# Patient Record
Sex: FEMALE | Race: WHITE | NOT HISPANIC OR LATINO
[De-identification: names, ages, dates, MRNs, and addresses within clinical notes are randomized per-mention and may not be internally consistent; named-entity substitution may affect disease eponyms.]

---

## 2023-07-01 ENCOUNTER — NON-APPOINTMENT (OUTPATIENT)
Age: 58
End: 2023-07-01

## 2023-07-07 PROBLEM — Z00.00 ENCOUNTER FOR PREVENTIVE HEALTH EXAMINATION: Status: ACTIVE | Noted: 2023-07-07

## 2023-07-27 ENCOUNTER — APPOINTMENT (OUTPATIENT)
Dept: CARDIOLOGY | Facility: CLINIC | Age: 58
End: 2023-07-27

## 2023-10-06 ENCOUNTER — RESULT REVIEW (OUTPATIENT)
Age: 58
End: 2023-10-06

## 2023-10-16 ENCOUNTER — APPOINTMENT (OUTPATIENT)
Dept: HEMATOLOGY ONCOLOGY | Facility: CLINIC | Age: 58
End: 2023-10-16
Payer: COMMERCIAL

## 2023-10-16 ENCOUNTER — RESULT REVIEW (OUTPATIENT)
Age: 58
End: 2023-10-16

## 2023-10-16 ENCOUNTER — TRANSCRIPTION ENCOUNTER (OUTPATIENT)
Age: 58
End: 2023-10-16

## 2023-10-16 ENCOUNTER — OUTPATIENT (OUTPATIENT)
Dept: OUTPATIENT SERVICES | Facility: HOSPITAL | Age: 58
LOS: 1 days | End: 2023-10-16
Payer: MEDICAID

## 2023-10-16 ENCOUNTER — NON-APPOINTMENT (OUTPATIENT)
Age: 58
End: 2023-10-16

## 2023-10-16 VITALS
HEIGHT: 63.39 IN | OXYGEN SATURATION: 94 % | HEART RATE: 95 BPM | WEIGHT: 199 LBS | DIASTOLIC BLOOD PRESSURE: 80 MMHG | TEMPERATURE: 97.7 F | BODY MASS INDEX: 34.82 KG/M2 | SYSTOLIC BLOOD PRESSURE: 123 MMHG

## 2023-10-16 DIAGNOSIS — C78.6 SECONDARY MALIGNANT NEOPLASM OF RETROPERITONEUM AND PERITONEUM: ICD-10-CM

## 2023-10-16 LAB
BASOPHILS # BLD AUTO: 0.11 K/UL — SIGNIFICANT CHANGE UP (ref 0–0.2)
BASOPHILS NFR BLD AUTO: 1.1 % — SIGNIFICANT CHANGE UP (ref 0–2)
EOSINOPHIL # BLD AUTO: 0.21 K/UL — SIGNIFICANT CHANGE UP (ref 0–0.5)
EOSINOPHIL NFR BLD AUTO: 2.1 % — SIGNIFICANT CHANGE UP (ref 0–6)
HCT VFR BLD CALC: 37 % — SIGNIFICANT CHANGE UP (ref 34.5–45)
HGB BLD-MCNC: 11.4 G/DL — LOW (ref 11.5–15.5)
IMM GRANULOCYTES NFR BLD AUTO: 0.4 % — SIGNIFICANT CHANGE UP (ref 0–0.9)
LYMPHOCYTES # BLD AUTO: 1.5 K/UL — SIGNIFICANT CHANGE UP (ref 1–3.3)
LYMPHOCYTES # BLD AUTO: 15 % — SIGNIFICANT CHANGE UP (ref 13–44)
MCHC RBC-ENTMCNC: 26.5 PG — LOW (ref 27–34)
MCHC RBC-ENTMCNC: 30.8 GM/DL — LOW (ref 32–36)
MCV RBC AUTO: 86 FL — SIGNIFICANT CHANGE UP (ref 80–100)
MONOCYTES # BLD AUTO: 0.84 K/UL — SIGNIFICANT CHANGE UP (ref 0–0.9)
MONOCYTES NFR BLD AUTO: 8.4 % — SIGNIFICANT CHANGE UP (ref 2–14)
NEUTROPHILS # BLD AUTO: 7.32 K/UL — SIGNIFICANT CHANGE UP (ref 1.8–7.4)
NEUTROPHILS NFR BLD AUTO: 73 % — SIGNIFICANT CHANGE UP (ref 43–77)
NRBC # BLD: 0 /100 WBCS — SIGNIFICANT CHANGE UP (ref 0–0)
PLATELET # BLD AUTO: 597 K/UL — HIGH (ref 150–400)
RBC # BLD: 4.3 M/UL — SIGNIFICANT CHANGE UP (ref 3.8–5.2)
RBC # FLD: 13.2 % — SIGNIFICANT CHANGE UP (ref 10.3–14.5)
WBC # BLD: 10.02 K/UL — SIGNIFICANT CHANGE UP (ref 3.8–10.5)
WBC # FLD AUTO: 10.02 K/UL — SIGNIFICANT CHANGE UP (ref 3.8–10.5)

## 2023-10-16 PROCEDURE — 99215 OFFICE O/P EST HI 40 MIN: CPT

## 2023-10-16 RX ORDER — FUROSEMIDE 40 MG/1
40 TABLET ORAL
Refills: 0 | Status: ACTIVE | COMMUNITY

## 2023-10-16 RX ORDER — ATORVASTATIN CALCIUM 80 MG/1
80 TABLET, FILM COATED ORAL
Refills: 0 | Status: ACTIVE | COMMUNITY

## 2023-10-16 RX ORDER — METOPROLOL SUCCINATE 50 MG/1
50 TABLET, EXTENDED RELEASE ORAL
Refills: 0 | Status: ACTIVE | COMMUNITY

## 2023-10-16 RX ORDER — VALSARTAN 40 MG/1
TABLET ORAL
Refills: 0 | Status: ACTIVE | COMMUNITY

## 2023-10-18 ENCOUNTER — APPOINTMENT (OUTPATIENT)
Dept: NUCLEAR MEDICINE | Facility: CLINIC | Age: 58
End: 2023-10-18
Payer: COMMERCIAL

## 2023-10-18 LAB
ALBUMIN SERPL ELPH-MCNC: 3.5 G/DL
ALP BLD-CCNC: 169 U/L
ALT SERPL-CCNC: 59 U/L
ANION GAP SERPL CALC-SCNC: 14 MMOL/L
APTT BLD: 33.1 SEC
AST SERPL-CCNC: 46 U/L
BILIRUB SERPL-MCNC: 0.3 MG/DL
BUN SERPL-MCNC: 10 MG/DL
CALCIUM SERPL-MCNC: 9.5 MG/DL
CANCER AG125 SERPL-ACNC: 1364 U/ML
CHLORIDE SERPL-SCNC: 104 MMOL/L
CO2 SERPL-SCNC: 25 MMOL/L
CREAT SERPL-MCNC: 0.88 MG/DL
EGFR: 77 ML/MIN/1.73M2
FERRITIN SERPL-MCNC: 288 NG/ML
FOLATE SERPL-MCNC: 11.8 NG/ML
GLUCOSE SERPL-MCNC: 103 MG/DL
HBV CORE IGG+IGM SER QL: NONREACTIVE
HBV SURFACE AB SER QL: NONREACTIVE
HBV SURFACE AB SERPL IA-ACNC: <3 MIU/ML
HBV SURFACE AG SER QL: NONREACTIVE
HCG SERPL-MCNC: 1 MIU/ML
HCV AB SER QL: NONREACTIVE
HCV S/CO RATIO: 0.1 S/CO
HIV1+2 AB SPEC QL IA.RAPID: NONREACTIVE
INR PPP: 1.02 RATIO
IRON SATN MFR SERPL: 13 %
IRON SERPL-MCNC: 32 UG/DL
MAGNESIUM SERPL-MCNC: 2.3 MG/DL
PHOSPHATE SERPL-MCNC: 4.2 MG/DL
POTASSIUM SERPL-SCNC: 4.9 MMOL/L
PROT SERPL-MCNC: 6.9 G/DL
PT BLD: 11.5 SEC
SODIUM SERPL-SCNC: 143 MMOL/L
TIBC SERPL-MCNC: 241 UG/DL
UIBC SERPL-MCNC: 209 UG/DL
VIT B12 SERPL-MCNC: 553 PG/ML

## 2023-10-18 PROCEDURE — 78815 PET IMAGE W/CT SKULL-THIGH: CPT | Mod: PI

## 2023-10-18 PROCEDURE — A9552: CPT

## 2023-10-19 ENCOUNTER — APPOINTMENT (OUTPATIENT)
Dept: GYNECOLOGIC ONCOLOGY | Facility: CLINIC | Age: 58
End: 2023-10-19
Payer: COMMERCIAL

## 2023-10-19 VITALS
BODY MASS INDEX: 33.46 KG/M2 | OXYGEN SATURATION: 95 % | SYSTOLIC BLOOD PRESSURE: 136 MMHG | WEIGHT: 196 LBS | DIASTOLIC BLOOD PRESSURE: 80 MMHG | HEART RATE: 91 BPM | HEIGHT: 64 IN

## 2023-10-19 DIAGNOSIS — Z86.79 PERSONAL HISTORY OF OTHER DISEASES OF THE CIRCULATORY SYSTEM: ICD-10-CM

## 2023-10-19 DIAGNOSIS — Z80.42 FAMILY HISTORY OF MALIGNANT NEOPLASM OF PROSTATE: ICD-10-CM

## 2023-10-19 DIAGNOSIS — Z86.39 PERSONAL HISTORY OF OTHER ENDOCRINE, NUTRITIONAL AND METABOLIC DISEASE: ICD-10-CM

## 2023-10-19 DIAGNOSIS — Z87.891 PERSONAL HISTORY OF NICOTINE DEPENDENCE: ICD-10-CM

## 2023-10-19 PROCEDURE — 99204 OFFICE O/P NEW MOD 45 MIN: CPT

## 2023-10-20 ENCOUNTER — NON-APPOINTMENT (OUTPATIENT)
Age: 58
End: 2023-10-20

## 2023-10-25 RX ORDER — PROCHLORPERAZINE MALEATE 10 MG/1
10 TABLET ORAL
Qty: 30 | Refills: 2 | Status: ACTIVE | COMMUNITY
Start: 2023-10-25 | End: 1900-01-01

## 2023-10-25 RX ORDER — ONDANSETRON 8 MG/1
8 TABLET ORAL EVERY 8 HOURS
Qty: 30 | Refills: 2 | Status: ACTIVE | COMMUNITY
Start: 2023-10-25 | End: 1900-01-01

## 2023-10-26 ENCOUNTER — NON-APPOINTMENT (OUTPATIENT)
Age: 58
End: 2023-10-26

## 2023-10-26 ENCOUNTER — APPOINTMENT (OUTPATIENT)
Dept: HEMATOLOGY ONCOLOGY | Facility: CLINIC | Age: 58
End: 2023-10-26

## 2023-10-26 VITALS
BODY MASS INDEX: 35.12 KG/M2 | TEMPERATURE: 98 F | HEART RATE: 96 BPM | DIASTOLIC BLOOD PRESSURE: 83 MMHG | HEIGHT: 62.95 IN | WEIGHT: 198.2 LBS | OXYGEN SATURATION: 96 % | SYSTOLIC BLOOD PRESSURE: 142 MMHG

## 2023-10-31 ENCOUNTER — RESULT REVIEW (OUTPATIENT)
Age: 58
End: 2023-10-31

## 2023-11-02 ENCOUNTER — APPOINTMENT (OUTPATIENT)
Dept: HEMATOLOGY ONCOLOGY | Facility: CLINIC | Age: 58
End: 2023-11-02
Payer: COMMERCIAL

## 2023-11-02 ENCOUNTER — RESULT REVIEW (OUTPATIENT)
Age: 58
End: 2023-11-02

## 2023-11-02 ENCOUNTER — NON-APPOINTMENT (OUTPATIENT)
Age: 58
End: 2023-11-02

## 2023-11-02 ENCOUNTER — APPOINTMENT (OUTPATIENT)
Dept: INFUSION THERAPY | Facility: CANCER CENTER | Age: 58
End: 2023-11-02

## 2023-11-02 VITALS
OXYGEN SATURATION: 94 % | DIASTOLIC BLOOD PRESSURE: 78 MMHG | RESPIRATION RATE: 18 BRPM | BODY MASS INDEX: 34.18 KG/M2 | WEIGHT: 195.33 LBS | SYSTOLIC BLOOD PRESSURE: 147 MMHG | HEIGHT: 63.23 IN | HEART RATE: 80 BPM | TEMPERATURE: 97.2 F

## 2023-11-02 LAB
ALBUMIN SERPL ELPH-MCNC: 3.7 G/DL — SIGNIFICANT CHANGE UP (ref 3.3–5)
ALBUMIN SERPL ELPH-MCNC: 3.7 G/DL — SIGNIFICANT CHANGE UP (ref 3.3–5)
ALP SERPL-CCNC: 150 U/L — HIGH (ref 40–120)
ALP SERPL-CCNC: 150 U/L — HIGH (ref 40–120)
ALT FLD-CCNC: 29 U/L — SIGNIFICANT CHANGE UP (ref 10–45)
ALT FLD-CCNC: 29 U/L — SIGNIFICANT CHANGE UP (ref 10–45)
ANION GAP SERPL CALC-SCNC: 15 MMOL/L — SIGNIFICANT CHANGE UP (ref 5–17)
ANION GAP SERPL CALC-SCNC: 15 MMOL/L — SIGNIFICANT CHANGE UP (ref 5–17)
AST SERPL-CCNC: 24 U/L — SIGNIFICANT CHANGE UP (ref 10–40)
AST SERPL-CCNC: 24 U/L — SIGNIFICANT CHANGE UP (ref 10–40)
BASOPHILS # BLD AUTO: 0.03 K/UL — SIGNIFICANT CHANGE UP (ref 0–0.2)
BASOPHILS # BLD AUTO: 0.03 K/UL — SIGNIFICANT CHANGE UP (ref 0–0.2)
BASOPHILS NFR BLD AUTO: 0.3 % — SIGNIFICANT CHANGE UP (ref 0–2)
BASOPHILS NFR BLD AUTO: 0.3 % — SIGNIFICANT CHANGE UP (ref 0–2)
BILIRUB SERPL-MCNC: 0.4 MG/DL — SIGNIFICANT CHANGE UP (ref 0.2–1.2)
BILIRUB SERPL-MCNC: 0.4 MG/DL — SIGNIFICANT CHANGE UP (ref 0.2–1.2)
BUN SERPL-MCNC: 12 MG/DL — SIGNIFICANT CHANGE UP (ref 7–23)
BUN SERPL-MCNC: 12 MG/DL — SIGNIFICANT CHANGE UP (ref 7–23)
CALCIUM SERPL-MCNC: 9.5 MG/DL — SIGNIFICANT CHANGE UP (ref 8.4–10.5)
CALCIUM SERPL-MCNC: 9.5 MG/DL — SIGNIFICANT CHANGE UP (ref 8.4–10.5)
CHLORIDE SERPL-SCNC: 100 MMOL/L — SIGNIFICANT CHANGE UP (ref 96–108)
CHLORIDE SERPL-SCNC: 100 MMOL/L — SIGNIFICANT CHANGE UP (ref 96–108)
CO2 SERPL-SCNC: 23 MMOL/L — SIGNIFICANT CHANGE UP (ref 22–31)
CO2 SERPL-SCNC: 23 MMOL/L — SIGNIFICANT CHANGE UP (ref 22–31)
CREAT SERPL-MCNC: 0.78 MG/DL — SIGNIFICANT CHANGE UP (ref 0.5–1.3)
CREAT SERPL-MCNC: 0.78 MG/DL — SIGNIFICANT CHANGE UP (ref 0.5–1.3)
EGFR: 89 ML/MIN/1.73M2 — SIGNIFICANT CHANGE UP
EGFR: 89 ML/MIN/1.73M2 — SIGNIFICANT CHANGE UP
EOSINOPHIL # BLD AUTO: 0.02 K/UL — SIGNIFICANT CHANGE UP (ref 0–0.5)
EOSINOPHIL # BLD AUTO: 0.02 K/UL — SIGNIFICANT CHANGE UP (ref 0–0.5)
EOSINOPHIL NFR BLD AUTO: 0.2 % — SIGNIFICANT CHANGE UP (ref 0–6)
EOSINOPHIL NFR BLD AUTO: 0.2 % — SIGNIFICANT CHANGE UP (ref 0–6)
GLUCOSE SERPL-MCNC: 235 MG/DL — HIGH (ref 70–99)
GLUCOSE SERPL-MCNC: 235 MG/DL — HIGH (ref 70–99)
HCT VFR BLD CALC: 34.8 % — SIGNIFICANT CHANGE UP (ref 34.5–45)
HCT VFR BLD CALC: 34.8 % — SIGNIFICANT CHANGE UP (ref 34.5–45)
HGB BLD-MCNC: 11 G/DL — LOW (ref 11.5–15.5)
HGB BLD-MCNC: 11 G/DL — LOW (ref 11.5–15.5)
IMM GRANULOCYTES NFR BLD AUTO: 0.5 % — SIGNIFICANT CHANGE UP (ref 0–0.9)
IMM GRANULOCYTES NFR BLD AUTO: 0.5 % — SIGNIFICANT CHANGE UP (ref 0–0.9)
LYMPHOCYTES # BLD AUTO: 0.58 K/UL — LOW (ref 1–3.3)
LYMPHOCYTES # BLD AUTO: 0.58 K/UL — LOW (ref 1–3.3)
LYMPHOCYTES # BLD AUTO: 5.4 % — LOW (ref 13–44)
LYMPHOCYTES # BLD AUTO: 5.4 % — LOW (ref 13–44)
MAGNESIUM SERPL-MCNC: 2 MG/DL — SIGNIFICANT CHANGE UP (ref 1.6–2.6)
MAGNESIUM SERPL-MCNC: 2 MG/DL — SIGNIFICANT CHANGE UP (ref 1.6–2.6)
MCHC RBC-ENTMCNC: 26.3 PG — LOW (ref 27–34)
MCHC RBC-ENTMCNC: 26.3 PG — LOW (ref 27–34)
MCHC RBC-ENTMCNC: 31.6 GM/DL — LOW (ref 32–36)
MCHC RBC-ENTMCNC: 31.6 GM/DL — LOW (ref 32–36)
MCV RBC AUTO: 83.1 FL — SIGNIFICANT CHANGE UP (ref 80–100)
MCV RBC AUTO: 83.1 FL — SIGNIFICANT CHANGE UP (ref 80–100)
MONOCYTES # BLD AUTO: 0.16 K/UL — SIGNIFICANT CHANGE UP (ref 0–0.9)
MONOCYTES # BLD AUTO: 0.16 K/UL — SIGNIFICANT CHANGE UP (ref 0–0.9)
MONOCYTES NFR BLD AUTO: 1.5 % — LOW (ref 2–14)
MONOCYTES NFR BLD AUTO: 1.5 % — LOW (ref 2–14)
NEUTROPHILS # BLD AUTO: 9.99 K/UL — HIGH (ref 1.8–7.4)
NEUTROPHILS # BLD AUTO: 9.99 K/UL — HIGH (ref 1.8–7.4)
NEUTROPHILS NFR BLD AUTO: 92.1 % — HIGH (ref 43–77)
NEUTROPHILS NFR BLD AUTO: 92.1 % — HIGH (ref 43–77)
NRBC # BLD: 0 /100 WBCS — SIGNIFICANT CHANGE UP (ref 0–0)
NRBC # BLD: 0 /100 WBCS — SIGNIFICANT CHANGE UP (ref 0–0)
PHOSPHATE SERPL-MCNC: 3.7 MG/DL — SIGNIFICANT CHANGE UP (ref 2.5–4.5)
PHOSPHATE SERPL-MCNC: 3.7 MG/DL — SIGNIFICANT CHANGE UP (ref 2.5–4.5)
PLATELET # BLD AUTO: 522 K/UL — HIGH (ref 150–400)
PLATELET # BLD AUTO: 522 K/UL — HIGH (ref 150–400)
POTASSIUM SERPL-MCNC: 4 MMOL/L — SIGNIFICANT CHANGE UP (ref 3.5–5.3)
POTASSIUM SERPL-MCNC: 4 MMOL/L — SIGNIFICANT CHANGE UP (ref 3.5–5.3)
POTASSIUM SERPL-SCNC: 4 MMOL/L — SIGNIFICANT CHANGE UP (ref 3.5–5.3)
POTASSIUM SERPL-SCNC: 4 MMOL/L — SIGNIFICANT CHANGE UP (ref 3.5–5.3)
PROT SERPL-MCNC: 7.1 G/DL — SIGNIFICANT CHANGE UP (ref 6–8.3)
PROT SERPL-MCNC: 7.1 G/DL — SIGNIFICANT CHANGE UP (ref 6–8.3)
RBC # BLD: 4.19 M/UL — SIGNIFICANT CHANGE UP (ref 3.8–5.2)
RBC # BLD: 4.19 M/UL — SIGNIFICANT CHANGE UP (ref 3.8–5.2)
RBC # FLD: 13.2 % — SIGNIFICANT CHANGE UP (ref 10.3–14.5)
RBC # FLD: 13.2 % — SIGNIFICANT CHANGE UP (ref 10.3–14.5)
SODIUM SERPL-SCNC: 138 MMOL/L — SIGNIFICANT CHANGE UP (ref 135–145)
SODIUM SERPL-SCNC: 138 MMOL/L — SIGNIFICANT CHANGE UP (ref 135–145)
WBC # BLD: 10.83 K/UL — HIGH (ref 3.8–10.5)
WBC # BLD: 10.83 K/UL — HIGH (ref 3.8–10.5)
WBC # FLD AUTO: 10.83 K/UL — HIGH (ref 3.8–10.5)
WBC # FLD AUTO: 10.83 K/UL — HIGH (ref 3.8–10.5)

## 2023-11-02 PROCEDURE — 99214 OFFICE O/P EST MOD 30 MIN: CPT

## 2023-11-03 DIAGNOSIS — R11.2 NAUSEA WITH VOMITING, UNSPECIFIED: ICD-10-CM

## 2023-11-03 DIAGNOSIS — Z51.11 ENCOUNTER FOR ANTINEOPLASTIC CHEMOTHERAPY: ICD-10-CM

## 2023-11-03 DIAGNOSIS — C56.9 MALIGNANT NEOPLASM OF UNSPECIFIED OVARY: ICD-10-CM

## 2023-11-03 LAB
CREAT ?TM UR-MCNC: 19 MG/DL — SIGNIFICANT CHANGE UP
CREAT ?TM UR-MCNC: 19 MG/DL — SIGNIFICANT CHANGE UP
PROT ?TM UR-MCNC: <4 MG/DL — SIGNIFICANT CHANGE UP (ref 0–12)
PROT ?TM UR-MCNC: <4 MG/DL — SIGNIFICANT CHANGE UP (ref 0–12)
PROT/CREAT UR-RTO: SIGNIFICANT CHANGE UP RATIO (ref 0–0.2)
PROT/CREAT UR-RTO: SIGNIFICANT CHANGE UP RATIO (ref 0–0.2)

## 2023-11-21 ENCOUNTER — APPOINTMENT (OUTPATIENT)
Dept: HEMATOLOGY ONCOLOGY | Facility: CLINIC | Age: 58
End: 2023-11-21

## 2023-11-21 ENCOUNTER — RESULT REVIEW (OUTPATIENT)
Age: 58
End: 2023-11-21

## 2023-11-21 LAB
BASOPHILS # BLD AUTO: 0.08 K/UL — SIGNIFICANT CHANGE UP (ref 0–0.2)
BASOPHILS # BLD AUTO: 0.08 K/UL — SIGNIFICANT CHANGE UP (ref 0–0.2)
BASOPHILS NFR BLD AUTO: 0.8 % — SIGNIFICANT CHANGE UP (ref 0–2)
BASOPHILS NFR BLD AUTO: 0.8 % — SIGNIFICANT CHANGE UP (ref 0–2)
EOSINOPHIL # BLD AUTO: 0.09 K/UL — SIGNIFICANT CHANGE UP (ref 0–0.5)
EOSINOPHIL # BLD AUTO: 0.09 K/UL — SIGNIFICANT CHANGE UP (ref 0–0.5)
EOSINOPHIL NFR BLD AUTO: 0.9 % — SIGNIFICANT CHANGE UP (ref 0–6)
EOSINOPHIL NFR BLD AUTO: 0.9 % — SIGNIFICANT CHANGE UP (ref 0–6)
HCT VFR BLD CALC: 34.7 % — SIGNIFICANT CHANGE UP (ref 34.5–45)
HCT VFR BLD CALC: 34.7 % — SIGNIFICANT CHANGE UP (ref 34.5–45)
HGB BLD-MCNC: 10.9 G/DL — LOW (ref 11.5–15.5)
HGB BLD-MCNC: 10.9 G/DL — LOW (ref 11.5–15.5)
IMM GRANULOCYTES NFR BLD AUTO: 0.8 % — SIGNIFICANT CHANGE UP (ref 0–0.9)
IMM GRANULOCYTES NFR BLD AUTO: 0.8 % — SIGNIFICANT CHANGE UP (ref 0–0.9)
LYMPHOCYTES # BLD AUTO: 2.34 K/UL — SIGNIFICANT CHANGE UP (ref 1–3.3)
LYMPHOCYTES # BLD AUTO: 2.34 K/UL — SIGNIFICANT CHANGE UP (ref 1–3.3)
LYMPHOCYTES # BLD AUTO: 23.9 % — SIGNIFICANT CHANGE UP (ref 13–44)
LYMPHOCYTES # BLD AUTO: 23.9 % — SIGNIFICANT CHANGE UP (ref 13–44)
MCHC RBC-ENTMCNC: 25.7 PG — LOW (ref 27–34)
MCHC RBC-ENTMCNC: 25.7 PG — LOW (ref 27–34)
MCHC RBC-ENTMCNC: 31.4 GM/DL — LOW (ref 32–36)
MCHC RBC-ENTMCNC: 31.4 GM/DL — LOW (ref 32–36)
MCV RBC AUTO: 81.8 FL — SIGNIFICANT CHANGE UP (ref 80–100)
MCV RBC AUTO: 81.8 FL — SIGNIFICANT CHANGE UP (ref 80–100)
MONOCYTES # BLD AUTO: 1.01 K/UL — HIGH (ref 0–0.9)
MONOCYTES # BLD AUTO: 1.01 K/UL — HIGH (ref 0–0.9)
MONOCYTES NFR BLD AUTO: 10.3 % — SIGNIFICANT CHANGE UP (ref 2–14)
MONOCYTES NFR BLD AUTO: 10.3 % — SIGNIFICANT CHANGE UP (ref 2–14)
NEUTROPHILS # BLD AUTO: 6.18 K/UL — SIGNIFICANT CHANGE UP (ref 1.8–7.4)
NEUTROPHILS # BLD AUTO: 6.18 K/UL — SIGNIFICANT CHANGE UP (ref 1.8–7.4)
NEUTROPHILS NFR BLD AUTO: 63.3 % — SIGNIFICANT CHANGE UP (ref 43–77)
NEUTROPHILS NFR BLD AUTO: 63.3 % — SIGNIFICANT CHANGE UP (ref 43–77)
NRBC # BLD: 0 /100 WBCS — SIGNIFICANT CHANGE UP (ref 0–0)
NRBC # BLD: 0 /100 WBCS — SIGNIFICANT CHANGE UP (ref 0–0)
PLATELET # BLD AUTO: 261 K/UL — SIGNIFICANT CHANGE UP (ref 150–400)
PLATELET # BLD AUTO: 261 K/UL — SIGNIFICANT CHANGE UP (ref 150–400)
RBC # BLD: 4.24 M/UL — SIGNIFICANT CHANGE UP (ref 3.8–5.2)
RBC # BLD: 4.24 M/UL — SIGNIFICANT CHANGE UP (ref 3.8–5.2)
RBC # FLD: 14.4 % — SIGNIFICANT CHANGE UP (ref 10.3–14.5)
RBC # FLD: 14.4 % — SIGNIFICANT CHANGE UP (ref 10.3–14.5)
WBC # BLD: 9.78 K/UL — SIGNIFICANT CHANGE UP (ref 3.8–10.5)
WBC # BLD: 9.78 K/UL — SIGNIFICANT CHANGE UP (ref 3.8–10.5)
WBC # FLD AUTO: 9.78 K/UL — SIGNIFICANT CHANGE UP (ref 3.8–10.5)
WBC # FLD AUTO: 9.78 K/UL — SIGNIFICANT CHANGE UP (ref 3.8–10.5)

## 2023-11-22 LAB
ALBUMIN SERPL ELPH-MCNC: 4 G/DL — SIGNIFICANT CHANGE UP (ref 3.3–5)
ALBUMIN SERPL ELPH-MCNC: 4 G/DL — SIGNIFICANT CHANGE UP (ref 3.3–5)
ALP SERPL-CCNC: 146 U/L — HIGH (ref 40–120)
ALP SERPL-CCNC: 146 U/L — HIGH (ref 40–120)
ALT FLD-CCNC: 30 U/L — SIGNIFICANT CHANGE UP (ref 10–45)
ALT FLD-CCNC: 30 U/L — SIGNIFICANT CHANGE UP (ref 10–45)
ANION GAP SERPL CALC-SCNC: 14 MMOL/L — SIGNIFICANT CHANGE UP (ref 5–17)
ANION GAP SERPL CALC-SCNC: 14 MMOL/L — SIGNIFICANT CHANGE UP (ref 5–17)
AST SERPL-CCNC: 21 U/L — SIGNIFICANT CHANGE UP (ref 10–40)
AST SERPL-CCNC: 21 U/L — SIGNIFICANT CHANGE UP (ref 10–40)
BILIRUB SERPL-MCNC: 0.3 MG/DL — SIGNIFICANT CHANGE UP (ref 0.2–1.2)
BILIRUB SERPL-MCNC: 0.3 MG/DL — SIGNIFICANT CHANGE UP (ref 0.2–1.2)
BUN SERPL-MCNC: 15 MG/DL — SIGNIFICANT CHANGE UP (ref 7–23)
BUN SERPL-MCNC: 15 MG/DL — SIGNIFICANT CHANGE UP (ref 7–23)
CALCIUM SERPL-MCNC: 9.6 MG/DL — SIGNIFICANT CHANGE UP (ref 8.4–10.5)
CALCIUM SERPL-MCNC: 9.6 MG/DL — SIGNIFICANT CHANGE UP (ref 8.4–10.5)
CHLORIDE SERPL-SCNC: 101 MMOL/L — SIGNIFICANT CHANGE UP (ref 96–108)
CHLORIDE SERPL-SCNC: 101 MMOL/L — SIGNIFICANT CHANGE UP (ref 96–108)
CO2 SERPL-SCNC: 26 MMOL/L — SIGNIFICANT CHANGE UP (ref 22–31)
CO2 SERPL-SCNC: 26 MMOL/L — SIGNIFICANT CHANGE UP (ref 22–31)
CREAT SERPL-MCNC: 0.86 MG/DL — SIGNIFICANT CHANGE UP (ref 0.5–1.3)
CREAT SERPL-MCNC: 0.86 MG/DL — SIGNIFICANT CHANGE UP (ref 0.5–1.3)
EGFR: 79 ML/MIN/1.73M2 — SIGNIFICANT CHANGE UP
EGFR: 79 ML/MIN/1.73M2 — SIGNIFICANT CHANGE UP
GLUCOSE SERPL-MCNC: 89 MG/DL — SIGNIFICANT CHANGE UP (ref 70–99)
GLUCOSE SERPL-MCNC: 89 MG/DL — SIGNIFICANT CHANGE UP (ref 70–99)
MAGNESIUM SERPL-MCNC: 2.3 MG/DL — SIGNIFICANT CHANGE UP (ref 1.6–2.6)
MAGNESIUM SERPL-MCNC: 2.3 MG/DL — SIGNIFICANT CHANGE UP (ref 1.6–2.6)
POTASSIUM SERPL-MCNC: 4.7 MMOL/L — SIGNIFICANT CHANGE UP (ref 3.5–5.3)
POTASSIUM SERPL-MCNC: 4.7 MMOL/L — SIGNIFICANT CHANGE UP (ref 3.5–5.3)
POTASSIUM SERPL-SCNC: 4.7 MMOL/L — SIGNIFICANT CHANGE UP (ref 3.5–5.3)
POTASSIUM SERPL-SCNC: 4.7 MMOL/L — SIGNIFICANT CHANGE UP (ref 3.5–5.3)
PROT SERPL-MCNC: 7.2 G/DL — SIGNIFICANT CHANGE UP (ref 6–8.3)
PROT SERPL-MCNC: 7.2 G/DL — SIGNIFICANT CHANGE UP (ref 6–8.3)
SODIUM SERPL-SCNC: 140 MMOL/L — SIGNIFICANT CHANGE UP (ref 135–145)
SODIUM SERPL-SCNC: 140 MMOL/L — SIGNIFICANT CHANGE UP (ref 135–145)

## 2023-11-24 ENCOUNTER — RESULT REVIEW (OUTPATIENT)
Age: 58
End: 2023-11-24

## 2023-11-24 ENCOUNTER — APPOINTMENT (OUTPATIENT)
Dept: INFUSION THERAPY | Facility: CANCER CENTER | Age: 58
End: 2023-11-24

## 2023-11-24 ENCOUNTER — APPOINTMENT (OUTPATIENT)
Dept: HEMATOLOGY ONCOLOGY | Facility: CLINIC | Age: 58
End: 2023-11-24
Payer: COMMERCIAL

## 2023-11-24 DIAGNOSIS — G89.3 NEOPLASM RELATED PAIN (ACUTE) (CHRONIC): ICD-10-CM

## 2023-11-24 LAB
BASOPHILS # BLD AUTO: 0 K/UL — SIGNIFICANT CHANGE UP (ref 0–0.2)
BASOPHILS # BLD AUTO: 0 K/UL — SIGNIFICANT CHANGE UP (ref 0–0.2)
BASOPHILS NFR BLD AUTO: 0 % — SIGNIFICANT CHANGE UP (ref 0–2)
BASOPHILS NFR BLD AUTO: 0 % — SIGNIFICANT CHANGE UP (ref 0–2)
CREAT ?TM UR-MCNC: 92 MG/DL — SIGNIFICANT CHANGE UP
CREAT ?TM UR-MCNC: 92 MG/DL — SIGNIFICANT CHANGE UP
EOSINOPHIL # BLD AUTO: 0.13 K/UL — SIGNIFICANT CHANGE UP (ref 0–0.5)
EOSINOPHIL # BLD AUTO: 0.13 K/UL — SIGNIFICANT CHANGE UP (ref 0–0.5)
EOSINOPHIL NFR BLD AUTO: 1 % — SIGNIFICANT CHANGE UP (ref 0–6)
EOSINOPHIL NFR BLD AUTO: 1 % — SIGNIFICANT CHANGE UP (ref 0–6)
HCT VFR BLD CALC: 33.1 % — LOW (ref 34.5–45)
HCT VFR BLD CALC: 33.1 % — LOW (ref 34.5–45)
HGB BLD-MCNC: 10.7 G/DL — LOW (ref 11.5–15.5)
HGB BLD-MCNC: 10.7 G/DL — LOW (ref 11.5–15.5)
LYMPHOCYTES # BLD AUTO: 0.78 K/UL — LOW (ref 1–3.3)
LYMPHOCYTES # BLD AUTO: 0.78 K/UL — LOW (ref 1–3.3)
LYMPHOCYTES # BLD AUTO: 6 % — LOW (ref 13–44)
LYMPHOCYTES # BLD AUTO: 6 % — LOW (ref 13–44)
MCHC RBC-ENTMCNC: 26.3 PG — LOW (ref 27–34)
MCHC RBC-ENTMCNC: 26.3 PG — LOW (ref 27–34)
MCHC RBC-ENTMCNC: 32.3 GM/DL — SIGNIFICANT CHANGE UP (ref 32–36)
MCHC RBC-ENTMCNC: 32.3 GM/DL — SIGNIFICANT CHANGE UP (ref 32–36)
MCV RBC AUTO: 81.3 FL — SIGNIFICANT CHANGE UP (ref 80–100)
MCV RBC AUTO: 81.3 FL — SIGNIFICANT CHANGE UP (ref 80–100)
METAMYELOCYTES # FLD: 1 % — HIGH (ref 0–0)
METAMYELOCYTES # FLD: 1 % — HIGH (ref 0–0)
MONOCYTES # BLD AUTO: 0.13 K/UL — SIGNIFICANT CHANGE UP (ref 0–0.9)
MONOCYTES # BLD AUTO: 0.13 K/UL — SIGNIFICANT CHANGE UP (ref 0–0.9)
MONOCYTES NFR BLD AUTO: 1 % — LOW (ref 2–14)
MONOCYTES NFR BLD AUTO: 1 % — LOW (ref 2–14)
NEUTROPHILS # BLD AUTO: 11.78 K/UL — HIGH (ref 1.8–7.4)
NEUTROPHILS # BLD AUTO: 11.78 K/UL — HIGH (ref 1.8–7.4)
NEUTROPHILS NFR BLD AUTO: 90 % — HIGH (ref 43–77)
NEUTROPHILS NFR BLD AUTO: 90 % — HIGH (ref 43–77)
NEUTS BAND # BLD: 1 % — SIGNIFICANT CHANGE UP (ref 0–8)
NEUTS BAND # BLD: 1 % — SIGNIFICANT CHANGE UP (ref 0–8)
NRBC # BLD: 0 /100 — SIGNIFICANT CHANGE UP (ref 0–0)
NRBC # BLD: 0 /100 — SIGNIFICANT CHANGE UP (ref 0–0)
NRBC # BLD: SIGNIFICANT CHANGE UP /100 WBCS (ref 0–0)
NRBC # BLD: SIGNIFICANT CHANGE UP /100 WBCS (ref 0–0)
PLAT MORPH BLD: NORMAL — SIGNIFICANT CHANGE UP
PLAT MORPH BLD: NORMAL — SIGNIFICANT CHANGE UP
PLATELET # BLD AUTO: 292 K/UL — SIGNIFICANT CHANGE UP (ref 150–400)
PLATELET # BLD AUTO: 292 K/UL — SIGNIFICANT CHANGE UP (ref 150–400)
PROT ?TM UR-MCNC: 16 MG/DL — HIGH (ref 0–12)
PROT ?TM UR-MCNC: 16 MG/DL — HIGH (ref 0–12)
PROT/CREAT UR-RTO: 0.2 RATIO — SIGNIFICANT CHANGE UP (ref 0–0.2)
PROT/CREAT UR-RTO: 0.2 RATIO — SIGNIFICANT CHANGE UP (ref 0–0.2)
RBC # BLD: 4.07 M/UL — SIGNIFICANT CHANGE UP (ref 3.8–5.2)
RBC # BLD: 4.07 M/UL — SIGNIFICANT CHANGE UP (ref 3.8–5.2)
RBC # FLD: 14.1 % — SIGNIFICANT CHANGE UP (ref 10.3–14.5)
RBC # FLD: 14.1 % — SIGNIFICANT CHANGE UP (ref 10.3–14.5)
RBC BLD AUTO: NORMAL — SIGNIFICANT CHANGE UP
RBC BLD AUTO: NORMAL — SIGNIFICANT CHANGE UP
WBC # BLD: 12.94 K/UL — HIGH (ref 3.8–10.5)
WBC # BLD: 12.94 K/UL — HIGH (ref 3.8–10.5)
WBC # FLD AUTO: 12.94 K/UL — HIGH (ref 3.8–10.5)
WBC # FLD AUTO: 12.94 K/UL — HIGH (ref 3.8–10.5)

## 2023-11-24 PROCEDURE — 99215 OFFICE O/P EST HI 40 MIN: CPT

## 2023-11-24 RX ORDER — TRAMADOL HYDROCHLORIDE 50 MG/1
50 TABLET, COATED ORAL
Qty: 45 | Refills: 0 | Status: ACTIVE | COMMUNITY
Start: 2023-11-24 | End: 1900-01-01

## 2023-12-11 ENCOUNTER — APPOINTMENT (OUTPATIENT)
Dept: HEMATOLOGY ONCOLOGY | Facility: CLINIC | Age: 58
End: 2023-12-11

## 2023-12-11 ENCOUNTER — RESULT REVIEW (OUTPATIENT)
Age: 58
End: 2023-12-11

## 2023-12-11 LAB
BASOPHILS # BLD AUTO: 0.06 K/UL — SIGNIFICANT CHANGE UP (ref 0–0.2)
BASOPHILS # BLD AUTO: 0.06 K/UL — SIGNIFICANT CHANGE UP (ref 0–0.2)
BASOPHILS NFR BLD AUTO: 0.8 % — SIGNIFICANT CHANGE UP (ref 0–2)
BASOPHILS NFR BLD AUTO: 0.8 % — SIGNIFICANT CHANGE UP (ref 0–2)
EOSINOPHIL # BLD AUTO: 0.03 K/UL — SIGNIFICANT CHANGE UP (ref 0–0.5)
EOSINOPHIL # BLD AUTO: 0.03 K/UL — SIGNIFICANT CHANGE UP (ref 0–0.5)
EOSINOPHIL NFR BLD AUTO: 0.4 % — SIGNIFICANT CHANGE UP (ref 0–6)
EOSINOPHIL NFR BLD AUTO: 0.4 % — SIGNIFICANT CHANGE UP (ref 0–6)
HCT VFR BLD CALC: 33.4 % — LOW (ref 34.5–45)
HCT VFR BLD CALC: 33.4 % — LOW (ref 34.5–45)
HGB BLD-MCNC: 10.9 G/DL — LOW (ref 11.5–15.5)
HGB BLD-MCNC: 10.9 G/DL — LOW (ref 11.5–15.5)
IMM GRANULOCYTES NFR BLD AUTO: 0.7 % — SIGNIFICANT CHANGE UP (ref 0–0.9)
IMM GRANULOCYTES NFR BLD AUTO: 0.7 % — SIGNIFICANT CHANGE UP (ref 0–0.9)
LYMPHOCYTES # BLD AUTO: 2.28 K/UL — SIGNIFICANT CHANGE UP (ref 1–3.3)
LYMPHOCYTES # BLD AUTO: 2.28 K/UL — SIGNIFICANT CHANGE UP (ref 1–3.3)
LYMPHOCYTES # BLD AUTO: 31.3 % — SIGNIFICANT CHANGE UP (ref 13–44)
LYMPHOCYTES # BLD AUTO: 31.3 % — SIGNIFICANT CHANGE UP (ref 13–44)
MCHC RBC-ENTMCNC: 26.6 PG — LOW (ref 27–34)
MCHC RBC-ENTMCNC: 26.6 PG — LOW (ref 27–34)
MCHC RBC-ENTMCNC: 32.6 GM/DL — SIGNIFICANT CHANGE UP (ref 32–36)
MCHC RBC-ENTMCNC: 32.6 GM/DL — SIGNIFICANT CHANGE UP (ref 32–36)
MCV RBC AUTO: 81.5 FL — SIGNIFICANT CHANGE UP (ref 80–100)
MCV RBC AUTO: 81.5 FL — SIGNIFICANT CHANGE UP (ref 80–100)
MONOCYTES # BLD AUTO: 0.8 K/UL — SIGNIFICANT CHANGE UP (ref 0–0.9)
MONOCYTES # BLD AUTO: 0.8 K/UL — SIGNIFICANT CHANGE UP (ref 0–0.9)
MONOCYTES NFR BLD AUTO: 11 % — SIGNIFICANT CHANGE UP (ref 2–14)
MONOCYTES NFR BLD AUTO: 11 % — SIGNIFICANT CHANGE UP (ref 2–14)
NEUTROPHILS # BLD AUTO: 4.06 K/UL — SIGNIFICANT CHANGE UP (ref 1.8–7.4)
NEUTROPHILS # BLD AUTO: 4.06 K/UL — SIGNIFICANT CHANGE UP (ref 1.8–7.4)
NEUTROPHILS NFR BLD AUTO: 55.8 % — SIGNIFICANT CHANGE UP (ref 43–77)
NEUTROPHILS NFR BLD AUTO: 55.8 % — SIGNIFICANT CHANGE UP (ref 43–77)
NRBC # BLD: 0 /100 WBCS — SIGNIFICANT CHANGE UP (ref 0–0)
NRBC # BLD: 0 /100 WBCS — SIGNIFICANT CHANGE UP (ref 0–0)
PLATELET # BLD AUTO: 281 K/UL — SIGNIFICANT CHANGE UP (ref 150–400)
PLATELET # BLD AUTO: 281 K/UL — SIGNIFICANT CHANGE UP (ref 150–400)
RBC # BLD: 4.1 M/UL — SIGNIFICANT CHANGE UP (ref 3.8–5.2)
RBC # BLD: 4.1 M/UL — SIGNIFICANT CHANGE UP (ref 3.8–5.2)
RBC # FLD: 16.2 % — HIGH (ref 10.3–14.5)
RBC # FLD: 16.2 % — HIGH (ref 10.3–14.5)
WBC # BLD: 7.28 K/UL — SIGNIFICANT CHANGE UP (ref 3.8–10.5)
WBC # BLD: 7.28 K/UL — SIGNIFICANT CHANGE UP (ref 3.8–10.5)
WBC # FLD AUTO: 7.28 K/UL — SIGNIFICANT CHANGE UP (ref 3.8–10.5)
WBC # FLD AUTO: 7.28 K/UL — SIGNIFICANT CHANGE UP (ref 3.8–10.5)

## 2023-12-12 ENCOUNTER — NON-APPOINTMENT (OUTPATIENT)
Age: 58
End: 2023-12-12

## 2023-12-12 LAB
ALBUMIN SERPL ELPH-MCNC: 3.6 G/DL
ALP BLD-CCNC: 128 U/L
ALT SERPL-CCNC: 27 U/L
ANION GAP SERPL CALC-SCNC: 12 MMOL/L
AST SERPL-CCNC: 14 U/L
BILIRUB SERPL-MCNC: 0.3 MG/DL
BUN SERPL-MCNC: 14 MG/DL
CALCIUM SERPL-MCNC: 9.3 MG/DL
CHLORIDE SERPL-SCNC: 103 MMOL/L
CO2 SERPL-SCNC: 24 MMOL/L
CREAT SERPL-MCNC: 0.92 MG/DL
EGFR: 73 ML/MIN/1.73M2
GLUCOSE SERPL-MCNC: 99 MG/DL
MAGNESIUM SERPL-MCNC: 2.2 MG/DL
PHOSPHATE SERPL-MCNC: 3.1 MG/DL
POTASSIUM SERPL-SCNC: 4.1 MMOL/L
PROT SERPL-MCNC: 7.2 G/DL
SODIUM SERPL-SCNC: 139 MMOL/L

## 2023-12-14 ENCOUNTER — RESULT REVIEW (OUTPATIENT)
Age: 58
End: 2023-12-14

## 2023-12-14 ENCOUNTER — APPOINTMENT (OUTPATIENT)
Dept: INFUSION THERAPY | Facility: CANCER CENTER | Age: 58
End: 2023-12-14

## 2023-12-14 ENCOUNTER — APPOINTMENT (OUTPATIENT)
Dept: HEMATOLOGY ONCOLOGY | Facility: CLINIC | Age: 58
End: 2023-12-14
Payer: COMMERCIAL

## 2023-12-14 LAB
ALBUMIN SERPL ELPH-MCNC: 3.8 G/DL — SIGNIFICANT CHANGE UP (ref 3.3–5)
ALBUMIN SERPL ELPH-MCNC: 3.8 G/DL — SIGNIFICANT CHANGE UP (ref 3.3–5)
ALP SERPL-CCNC: 118 U/L — SIGNIFICANT CHANGE UP (ref 40–120)
ALP SERPL-CCNC: 118 U/L — SIGNIFICANT CHANGE UP (ref 40–120)
ALT FLD-CCNC: 30 U/L — SIGNIFICANT CHANGE UP (ref 10–45)
ALT FLD-CCNC: 30 U/L — SIGNIFICANT CHANGE UP (ref 10–45)
ANION GAP SERPL CALC-SCNC: 17 MMOL/L — SIGNIFICANT CHANGE UP (ref 5–17)
ANION GAP SERPL CALC-SCNC: 17 MMOL/L — SIGNIFICANT CHANGE UP (ref 5–17)
AST SERPL-CCNC: 21 U/L — SIGNIFICANT CHANGE UP (ref 10–40)
AST SERPL-CCNC: 21 U/L — SIGNIFICANT CHANGE UP (ref 10–40)
BILIRUB SERPL-MCNC: 0.3 MG/DL — SIGNIFICANT CHANGE UP (ref 0.2–1.2)
BILIRUB SERPL-MCNC: 0.3 MG/DL — SIGNIFICANT CHANGE UP (ref 0.2–1.2)
BUN SERPL-MCNC: 18 MG/DL — SIGNIFICANT CHANGE UP (ref 7–23)
BUN SERPL-MCNC: 18 MG/DL — SIGNIFICANT CHANGE UP (ref 7–23)
CALCIUM SERPL-MCNC: 9.2 MG/DL — SIGNIFICANT CHANGE UP (ref 8.4–10.5)
CALCIUM SERPL-MCNC: 9.2 MG/DL — SIGNIFICANT CHANGE UP (ref 8.4–10.5)
CHLORIDE SERPL-SCNC: 101 MMOL/L — SIGNIFICANT CHANGE UP (ref 96–108)
CHLORIDE SERPL-SCNC: 101 MMOL/L — SIGNIFICANT CHANGE UP (ref 96–108)
CO2 SERPL-SCNC: 20 MMOL/L — LOW (ref 22–31)
CO2 SERPL-SCNC: 20 MMOL/L — LOW (ref 22–31)
CREAT SERPL-MCNC: 0.72 MG/DL — SIGNIFICANT CHANGE UP (ref 0.5–1.3)
CREAT SERPL-MCNC: 0.72 MG/DL — SIGNIFICANT CHANGE UP (ref 0.5–1.3)
EGFR: 98 ML/MIN/1.73M2 — SIGNIFICANT CHANGE UP
EGFR: 98 ML/MIN/1.73M2 — SIGNIFICANT CHANGE UP
GLUCOSE SERPL-MCNC: 244 MG/DL — HIGH (ref 70–99)
GLUCOSE SERPL-MCNC: 244 MG/DL — HIGH (ref 70–99)
MAGNESIUM SERPL-MCNC: 1.9 MG/DL — SIGNIFICANT CHANGE UP (ref 1.6–2.6)
MAGNESIUM SERPL-MCNC: 1.9 MG/DL — SIGNIFICANT CHANGE UP (ref 1.6–2.6)
PHOSPHATE SERPL-MCNC: 3.8 MG/DL — SIGNIFICANT CHANGE UP (ref 2.5–4.5)
PHOSPHATE SERPL-MCNC: 3.8 MG/DL — SIGNIFICANT CHANGE UP (ref 2.5–4.5)
POTASSIUM SERPL-MCNC: 3.6 MMOL/L — SIGNIFICANT CHANGE UP (ref 3.5–5.3)
POTASSIUM SERPL-MCNC: 3.6 MMOL/L — SIGNIFICANT CHANGE UP (ref 3.5–5.3)
POTASSIUM SERPL-SCNC: 3.6 MMOL/L — SIGNIFICANT CHANGE UP (ref 3.5–5.3)
POTASSIUM SERPL-SCNC: 3.6 MMOL/L — SIGNIFICANT CHANGE UP (ref 3.5–5.3)
PROT SERPL-MCNC: 7.2 G/DL — SIGNIFICANT CHANGE UP (ref 6–8.3)
PROT SERPL-MCNC: 7.2 G/DL — SIGNIFICANT CHANGE UP (ref 6–8.3)
SODIUM SERPL-SCNC: 138 MMOL/L — SIGNIFICANT CHANGE UP (ref 135–145)
SODIUM SERPL-SCNC: 138 MMOL/L — SIGNIFICANT CHANGE UP (ref 135–145)

## 2023-12-14 PROCEDURE — 96376 TX/PRO/DX INJ SAME DRUG ADON: CPT

## 2023-12-14 PROCEDURE — 84156 ASSAY OF PROTEIN URINE: CPT

## 2023-12-14 PROCEDURE — 83735 ASSAY OF MAGNESIUM: CPT

## 2023-12-14 PROCEDURE — 85027 COMPLETE CBC AUTOMATED: CPT

## 2023-12-14 PROCEDURE — 80053 COMPREHEN METABOLIC PANEL: CPT

## 2023-12-14 PROCEDURE — 96375 TX/PRO/DX INJ NEW DRUG ADDON: CPT

## 2023-12-14 PROCEDURE — 96417 CHEMO IV INFUS EACH ADDL SEQ: CPT

## 2023-12-14 PROCEDURE — 99214 OFFICE O/P EST MOD 30 MIN: CPT

## 2023-12-14 PROCEDURE — 96413 CHEMO IV INFUSION 1 HR: CPT

## 2023-12-14 PROCEDURE — 96415 CHEMO IV INFUSION ADDL HR: CPT

## 2023-12-14 PROCEDURE — 96411 CHEMO IV PUSH ADDL DRUG: CPT

## 2023-12-14 PROCEDURE — 84100 ASSAY OF PHOSPHORUS: CPT

## 2023-12-14 PROCEDURE — 82570 ASSAY OF URINE CREATININE: CPT

## 2023-12-14 NOTE — PHYSICAL EXAM
[Fully active, able to carry on all pre-disease performance without restriction] : Status 0 - Fully active, able to carry on all pre-disease performance without restriction [Normal] : affect appropriate [de-identified] : generally well appearing female, NAD, pleasant

## 2023-12-14 NOTE — RESULTS/DATA
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  She presents today to discuss options for systemic therapy.  We discussed that management of stage IV ovarian cancer is palliative in nature and goals will be to prolong her life and improve her symptoms.  She will likely require systemic therapy prior to any debulking surgery.  We referred her to Dr. Miroslava Perez at [facility] from gynecologic oncology.  Will recommend carbo/taxo/bevacizumab for a total of 6 cycles at this time, followed by maintenance therapy likely with niraparib.  Risks/benefits/side effects of this regimen were discussed in detail.  Recommend treatment w/ carboplatin AUC 5-6 IV q 21 days with taxol 175mg/m2 IV and avastin (bevacizumab) every 21 days for 6 cycles for treatment of stage IV ovarian cancer.  C1 chemotherapy w/ carbo/taxol initiated on 11/2/23. Here today for C2 w/ blas.  Taxol reaction w/ C2. Continue IV premeds for next cycle.  Here today for C3 carbo/taxol/blas. She is tolerating chemotherapy very well. Grade 1 neuropathy.  Will repeat imaging after cycle 3 to assess response.  She has a follow up with Dr. Miroslava Perez on 1/24/23.  RTC 3 weeks for cycle 4.   Foundation testing 10/6/23- TPS 0%, TC 0%, PDL1 0 IZAIAH score 24.5%, HRD Positive- likely benefit from maintenance niraparib  MS-Stable MTAP loss, NF1 mut, TP53 mut, CDKN2A/B loss [employer] germline testing 10/16/23- negative for pathogenic mutations or VUS   All patient questions answered at today's visit. Patient urged to call the office with any questions or concerns.

## 2023-12-14 NOTE — HISTORY OF PRESENT ILLNESS
[de-identified] : Referred by: hospital follow up  Gosia (daughter) [phone], Rosalina (lives in [place])  Her daughter translates per patient preference   Cancer Summary:  DIAGNOSIS: peritoneal carcinomatosis  PROCEDURE AND DATE: Abdominal lesion core biopsy 10/6/23 PATHOLOGY: metastatic carcinoma, the IHC profile is compatible w/ mullerian primary  STAGE: IV Chemotherapy:  C1 Carbo/taxol 11/2/23 C2 Carbo/taxol/blas 11/24/23 C3 carbo/taxol/blas 12/14/23 Radiation: N/A Hormonal: N/A STATUS: active, on chemotherapy  Genetics STATUS:  Foundation testing 10/6/23- TPS 0%, TC 0%, PDL1 0 IZAIAH score 24.5%, HRD Positive- likely benefit from maintenance niraparib  MS-Stable MTAP loss, NF1 mut, TP53 mut, CDKN2A/B loss  Ms. Mckeon presented at age 58 in October 2023 for evaluation of advanced ovarian cancer, peritoneal carcinomatosis. The patient has a medical history of HTN, HLD.    Antonella is here today with her 2 daughters (one of whom is in from [place]). She initially presented to [facility] in July 2023 with SOB, difficulty breathing- found to have a pleural effusion which was attributed to cardiac etiology, s/p thoracentesis. She generaly does not follow with health care providers and was not up to date on any health care maintenance. She was then readmitted to [facility] from 10/1/23 to 10/8/23 for SOB/JACKSON, workup revealed peritoneal carcinomatosis with ascites as well as right sided pleural effusion. She was evaluated by Dr. Karolina Rodriguez and underwent repeat imaging w/ abdomen pelvis CAP w/ contrast on 10/3/23- revealed diffuse peritoneal caricnomatosis, no cystic ovarian lesion, loculated left pelvic sidewall fluid collection/cystic lesion measuring 2.3cm x 1.4cm x 6cm. 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 She presents today to discuss options for systemic therapy.   At today's visit she reports her breathing has improved, remains on lasix. She denies fevers, chills, CP, nausea, vomiting, diarrhea. She does report a poor appetite and early satiety. She has lost 7lb since discharge from the hospital. Her LMP was 4 years ago. Denies any recent vaginal bleeding. She has not seen a gynecologist in many years. She has had 2 prior pregnancies and 2 live births, age at first birth was 23.    Imaging: CT abdomen/pelvis with contrast 10/1/2023-large right pleural effusion with associated right lower lobe collapse, small to moderate abdominal and pelvic ascites without definite omental caking, small cystic left adnexal lesion CT chest w/o contrast 10/7/23-interval right thoracentesis, overall decreased volume and new air component of small right hydropneumothorax, small lung nodules, some new Pelvic ultrasound 10/3/2023-Free pelvic fluid, 5 mm left ovarian cyst  Path:  Abdominal lesion core biopsy 10/6/23- metastatic carcinoma, the IHC profile is compatible w/ mullerian primary    Genetics: sent 10/16/23    HCM: - Colonoscopy: never done  - Gyn: never done  - Mammo: never done  - Lung cancer screen: never done (but had recent chest imaging)  - DEXA: never done    SH: - Occupation: works part time as a   - Living situation: lives in [place] w/ her , 2 children  - Smoking/etoh/illicits: former smoker, quit 2 months ago, denies etoh  - Exercise: not very physically active at this time   FH: - Her father had prostate cancer  [de-identified] : Antonella presents today for follow up on 12/14/23 for peritoneal carcinomatosis.   Here today for C3 carbo/taxol/blas 12/14/23. She is doing very well at today's visit. She reports very mild grade I neuropathy in a few fingers. Otherwise is tolerating chemotherapy without difficulty. She denies fevers, chills, CP, SOB, nausea, vomiting, diarrhea, mouth sores, LE swelling at this time. Will repeat imaging after cycle 3 to assess response. She has a follow up with Dr. Miroslava Perez on 1/24/23.   Laboratory studies reviewed at today's visit and notable for: WBC 7.28, Hb 10.9, plt 281   Foundation testing 10/6/23- TPS 0%, TC 0%, PDL1 0 IZAIAH score 24.5%, HRD Positive- likely benefit from maintenance niraparib  MS-Stable MTAP loss, NF1 mut, TP53 mut, CDKN2A/B loss

## 2023-12-15 ENCOUNTER — OUTPATIENT (OUTPATIENT)
Dept: OUTPATIENT SERVICES | Facility: HOSPITAL | Age: 58
LOS: 1 days | End: 2023-12-15
Payer: MEDICAID

## 2023-12-15 DIAGNOSIS — C56.9 MALIGNANT NEOPLASM OF UNSPECIFIED OVARY: ICD-10-CM

## 2023-12-15 DIAGNOSIS — R11.2 NAUSEA WITH VOMITING, UNSPECIFIED: ICD-10-CM

## 2023-12-15 DIAGNOSIS — Z51.11 ENCOUNTER FOR ANTINEOPLASTIC CHEMOTHERAPY: ICD-10-CM

## 2023-12-15 LAB
CREAT ?TM UR-MCNC: 27 MG/DL — SIGNIFICANT CHANGE UP
CREAT ?TM UR-MCNC: 27 MG/DL — SIGNIFICANT CHANGE UP
PROT ?TM UR-MCNC: 12 MG/DL — SIGNIFICANT CHANGE UP (ref 0–12)
PROT ?TM UR-MCNC: 12 MG/DL — SIGNIFICANT CHANGE UP (ref 0–12)
PROT/CREAT UR-RTO: 0.5 RATIO — HIGH (ref 0–0.2)
PROT/CREAT UR-RTO: 0.5 RATIO — HIGH (ref 0–0.2)

## 2023-12-18 ENCOUNTER — RESULT REVIEW (OUTPATIENT)
Age: 58
End: 2023-12-18

## 2023-12-18 ENCOUNTER — APPOINTMENT (OUTPATIENT)
Dept: INFUSION THERAPY | Facility: CANCER CENTER | Age: 58
End: 2023-12-18

## 2023-12-18 LAB
ALBUMIN SERPL ELPH-MCNC: 4 G/DL — SIGNIFICANT CHANGE UP (ref 3.3–5)
ALBUMIN SERPL ELPH-MCNC: 4 G/DL — SIGNIFICANT CHANGE UP (ref 3.3–5)
ALP SERPL-CCNC: 119 U/L — SIGNIFICANT CHANGE UP (ref 40–120)
ALP SERPL-CCNC: 119 U/L — SIGNIFICANT CHANGE UP (ref 40–120)
ALT FLD-CCNC: 27 U/L — SIGNIFICANT CHANGE UP (ref 10–45)
ALT FLD-CCNC: 27 U/L — SIGNIFICANT CHANGE UP (ref 10–45)
ANION GAP SERPL CALC-SCNC: 10 MMOL/L — SIGNIFICANT CHANGE UP (ref 5–17)
ANION GAP SERPL CALC-SCNC: 10 MMOL/L — SIGNIFICANT CHANGE UP (ref 5–17)
AST SERPL-CCNC: 19 U/L — SIGNIFICANT CHANGE UP (ref 10–40)
AST SERPL-CCNC: 19 U/L — SIGNIFICANT CHANGE UP (ref 10–40)
BASOPHILS # BLD AUTO: 0.04 K/UL — SIGNIFICANT CHANGE UP (ref 0–0.2)
BASOPHILS # BLD AUTO: 0.04 K/UL — SIGNIFICANT CHANGE UP (ref 0–0.2)
BASOPHILS NFR BLD AUTO: 0.6 % — SIGNIFICANT CHANGE UP (ref 0–2)
BASOPHILS NFR BLD AUTO: 0.6 % — SIGNIFICANT CHANGE UP (ref 0–2)
BILIRUB SERPL-MCNC: 0.5 MG/DL — SIGNIFICANT CHANGE UP (ref 0.2–1.2)
BILIRUB SERPL-MCNC: 0.5 MG/DL — SIGNIFICANT CHANGE UP (ref 0.2–1.2)
BUN SERPL-MCNC: 18 MG/DL — SIGNIFICANT CHANGE UP (ref 7–23)
BUN SERPL-MCNC: 18 MG/DL — SIGNIFICANT CHANGE UP (ref 7–23)
CALCIUM SERPL-MCNC: 9.3 MG/DL — SIGNIFICANT CHANGE UP (ref 8.4–10.5)
CALCIUM SERPL-MCNC: 9.3 MG/DL — SIGNIFICANT CHANGE UP (ref 8.4–10.5)
CHLORIDE SERPL-SCNC: 98 MMOL/L — SIGNIFICANT CHANGE UP (ref 96–108)
CHLORIDE SERPL-SCNC: 98 MMOL/L — SIGNIFICANT CHANGE UP (ref 96–108)
CO2 SERPL-SCNC: 28 MMOL/L — SIGNIFICANT CHANGE UP (ref 22–31)
CO2 SERPL-SCNC: 28 MMOL/L — SIGNIFICANT CHANGE UP (ref 22–31)
CREAT SERPL-MCNC: 0.86 MG/DL — SIGNIFICANT CHANGE UP (ref 0.5–1.3)
CREAT SERPL-MCNC: 0.86 MG/DL — SIGNIFICANT CHANGE UP (ref 0.5–1.3)
EGFR: 79 ML/MIN/1.73M2 — SIGNIFICANT CHANGE UP
EGFR: 79 ML/MIN/1.73M2 — SIGNIFICANT CHANGE UP
EOSINOPHIL # BLD AUTO: 0.07 K/UL — SIGNIFICANT CHANGE UP (ref 0–0.5)
EOSINOPHIL # BLD AUTO: 0.07 K/UL — SIGNIFICANT CHANGE UP (ref 0–0.5)
EOSINOPHIL NFR BLD AUTO: 1 % — SIGNIFICANT CHANGE UP (ref 0–6)
EOSINOPHIL NFR BLD AUTO: 1 % — SIGNIFICANT CHANGE UP (ref 0–6)
GLUCOSE SERPL-MCNC: 86 MG/DL — SIGNIFICANT CHANGE UP (ref 70–99)
GLUCOSE SERPL-MCNC: 86 MG/DL — SIGNIFICANT CHANGE UP (ref 70–99)
HCT VFR BLD CALC: 32.1 % — LOW (ref 34.5–45)
HCT VFR BLD CALC: 32.1 % — LOW (ref 34.5–45)
HGB BLD-MCNC: 10.5 G/DL — LOW (ref 11.5–15.5)
HGB BLD-MCNC: 10.5 G/DL — LOW (ref 11.5–15.5)
IMM GRANULOCYTES NFR BLD AUTO: 0.1 % — SIGNIFICANT CHANGE UP (ref 0–0.9)
IMM GRANULOCYTES NFR BLD AUTO: 0.1 % — SIGNIFICANT CHANGE UP (ref 0–0.9)
LYMPHOCYTES # BLD AUTO: 1.95 K/UL — SIGNIFICANT CHANGE UP (ref 1–3.3)
LYMPHOCYTES # BLD AUTO: 1.95 K/UL — SIGNIFICANT CHANGE UP (ref 1–3.3)
LYMPHOCYTES # BLD AUTO: 29 % — SIGNIFICANT CHANGE UP (ref 13–44)
LYMPHOCYTES # BLD AUTO: 29 % — SIGNIFICANT CHANGE UP (ref 13–44)
MAGNESIUM SERPL-MCNC: 2.1 MG/DL — SIGNIFICANT CHANGE UP (ref 1.6–2.6)
MAGNESIUM SERPL-MCNC: 2.1 MG/DL — SIGNIFICANT CHANGE UP (ref 1.6–2.6)
MCHC RBC-ENTMCNC: 26.7 PG — LOW (ref 27–34)
MCHC RBC-ENTMCNC: 26.7 PG — LOW (ref 27–34)
MCHC RBC-ENTMCNC: 32.7 GM/DL — SIGNIFICANT CHANGE UP (ref 32–36)
MCHC RBC-ENTMCNC: 32.7 GM/DL — SIGNIFICANT CHANGE UP (ref 32–36)
MCV RBC AUTO: 81.7 FL — SIGNIFICANT CHANGE UP (ref 80–100)
MCV RBC AUTO: 81.7 FL — SIGNIFICANT CHANGE UP (ref 80–100)
MONOCYTES # BLD AUTO: 0.68 K/UL — SIGNIFICANT CHANGE UP (ref 0–0.9)
MONOCYTES # BLD AUTO: 0.68 K/UL — SIGNIFICANT CHANGE UP (ref 0–0.9)
MONOCYTES NFR BLD AUTO: 10.1 % — SIGNIFICANT CHANGE UP (ref 2–14)
MONOCYTES NFR BLD AUTO: 10.1 % — SIGNIFICANT CHANGE UP (ref 2–14)
NEUTROPHILS # BLD AUTO: 3.97 K/UL — SIGNIFICANT CHANGE UP (ref 1.8–7.4)
NEUTROPHILS # BLD AUTO: 3.97 K/UL — SIGNIFICANT CHANGE UP (ref 1.8–7.4)
NEUTROPHILS NFR BLD AUTO: 59.2 % — SIGNIFICANT CHANGE UP (ref 43–77)
NEUTROPHILS NFR BLD AUTO: 59.2 % — SIGNIFICANT CHANGE UP (ref 43–77)
NRBC # BLD: 0 /100 WBCS — SIGNIFICANT CHANGE UP (ref 0–0)
NRBC # BLD: 0 /100 WBCS — SIGNIFICANT CHANGE UP (ref 0–0)
PHOSPHATE SERPL-MCNC: 3.7 MG/DL — SIGNIFICANT CHANGE UP (ref 2.5–4.5)
PHOSPHATE SERPL-MCNC: 3.7 MG/DL — SIGNIFICANT CHANGE UP (ref 2.5–4.5)
PLATELET # BLD AUTO: 347 K/UL — SIGNIFICANT CHANGE UP (ref 150–400)
PLATELET # BLD AUTO: 347 K/UL — SIGNIFICANT CHANGE UP (ref 150–400)
POTASSIUM SERPL-MCNC: 3.9 MMOL/L — SIGNIFICANT CHANGE UP (ref 3.5–5.3)
POTASSIUM SERPL-MCNC: 3.9 MMOL/L — SIGNIFICANT CHANGE UP (ref 3.5–5.3)
POTASSIUM SERPL-SCNC: 3.9 MMOL/L — SIGNIFICANT CHANGE UP (ref 3.5–5.3)
POTASSIUM SERPL-SCNC: 3.9 MMOL/L — SIGNIFICANT CHANGE UP (ref 3.5–5.3)
PROT SERPL-MCNC: 7.2 G/DL — SIGNIFICANT CHANGE UP (ref 6–8.3)
PROT SERPL-MCNC: 7.2 G/DL — SIGNIFICANT CHANGE UP (ref 6–8.3)
RBC # BLD: 3.93 M/UL — SIGNIFICANT CHANGE UP (ref 3.8–5.2)
RBC # BLD: 3.93 M/UL — SIGNIFICANT CHANGE UP (ref 3.8–5.2)
RBC # FLD: 17.3 % — HIGH (ref 10.3–14.5)
RBC # FLD: 17.3 % — HIGH (ref 10.3–14.5)
SODIUM SERPL-SCNC: 137 MMOL/L — SIGNIFICANT CHANGE UP (ref 135–145)
SODIUM SERPL-SCNC: 137 MMOL/L — SIGNIFICANT CHANGE UP (ref 135–145)
WBC # BLD: 6.72 K/UL — SIGNIFICANT CHANGE UP (ref 3.8–10.5)
WBC # BLD: 6.72 K/UL — SIGNIFICANT CHANGE UP (ref 3.8–10.5)
WBC # FLD AUTO: 6.72 K/UL — SIGNIFICANT CHANGE UP (ref 3.8–10.5)
WBC # FLD AUTO: 6.72 K/UL — SIGNIFICANT CHANGE UP (ref 3.8–10.5)

## 2023-12-27 ENCOUNTER — APPOINTMENT (OUTPATIENT)
Dept: NUCLEAR MEDICINE | Facility: CLINIC | Age: 58
End: 2023-12-27
Payer: COMMERCIAL

## 2023-12-27 PROCEDURE — A9552: CPT

## 2023-12-27 PROCEDURE — 78815 PET IMAGE W/CT SKULL-THIGH: CPT | Mod: PS

## 2024-01-03 ENCOUNTER — LABORATORY RESULT (OUTPATIENT)
Age: 59
End: 2024-01-03

## 2024-01-03 ENCOUNTER — RESULT REVIEW (OUTPATIENT)
Age: 59
End: 2024-01-03

## 2024-01-03 ENCOUNTER — APPOINTMENT (OUTPATIENT)
Dept: HEMATOLOGY ONCOLOGY | Facility: CLINIC | Age: 59
End: 2024-01-03

## 2024-01-03 LAB
BASOPHILS # BLD AUTO: 0.05 K/UL — SIGNIFICANT CHANGE UP (ref 0–0.2)
BASOPHILS # BLD AUTO: 0.05 K/UL — SIGNIFICANT CHANGE UP (ref 0–0.2)
BASOPHILS NFR BLD AUTO: 0.6 % — SIGNIFICANT CHANGE UP (ref 0–2)
BASOPHILS NFR BLD AUTO: 0.6 % — SIGNIFICANT CHANGE UP (ref 0–2)
EOSINOPHIL # BLD AUTO: 0.03 K/UL — SIGNIFICANT CHANGE UP (ref 0–0.5)
EOSINOPHIL # BLD AUTO: 0.03 K/UL — SIGNIFICANT CHANGE UP (ref 0–0.5)
EOSINOPHIL NFR BLD AUTO: 0.4 % — SIGNIFICANT CHANGE UP (ref 0–6)
EOSINOPHIL NFR BLD AUTO: 0.4 % — SIGNIFICANT CHANGE UP (ref 0–6)
HCT VFR BLD CALC: 29.9 % — LOW (ref 34.5–45)
HCT VFR BLD CALC: 29.9 % — LOW (ref 34.5–45)
HGB BLD-MCNC: 9.9 G/DL — LOW (ref 11.5–15.5)
HGB BLD-MCNC: 9.9 G/DL — LOW (ref 11.5–15.5)
IMM GRANULOCYTES NFR BLD AUTO: 0.5 % — SIGNIFICANT CHANGE UP (ref 0–0.9)
IMM GRANULOCYTES NFR BLD AUTO: 0.5 % — SIGNIFICANT CHANGE UP (ref 0–0.9)
LYMPHOCYTES # BLD AUTO: 2.64 K/UL — SIGNIFICANT CHANGE UP (ref 1–3.3)
LYMPHOCYTES # BLD AUTO: 2.64 K/UL — SIGNIFICANT CHANGE UP (ref 1–3.3)
LYMPHOCYTES # BLD AUTO: 33.4 % — SIGNIFICANT CHANGE UP (ref 13–44)
LYMPHOCYTES # BLD AUTO: 33.4 % — SIGNIFICANT CHANGE UP (ref 13–44)
MCHC RBC-ENTMCNC: 27.5 PG — SIGNIFICANT CHANGE UP (ref 27–34)
MCHC RBC-ENTMCNC: 27.5 PG — SIGNIFICANT CHANGE UP (ref 27–34)
MCHC RBC-ENTMCNC: 33.1 GM/DL — SIGNIFICANT CHANGE UP (ref 32–36)
MCHC RBC-ENTMCNC: 33.1 GM/DL — SIGNIFICANT CHANGE UP (ref 32–36)
MCV RBC AUTO: 83.1 FL — SIGNIFICANT CHANGE UP (ref 80–100)
MCV RBC AUTO: 83.1 FL — SIGNIFICANT CHANGE UP (ref 80–100)
MONOCYTES # BLD AUTO: 0.6 K/UL — SIGNIFICANT CHANGE UP (ref 0–0.9)
MONOCYTES # BLD AUTO: 0.6 K/UL — SIGNIFICANT CHANGE UP (ref 0–0.9)
MONOCYTES NFR BLD AUTO: 7.6 % — SIGNIFICANT CHANGE UP (ref 2–14)
MONOCYTES NFR BLD AUTO: 7.6 % — SIGNIFICANT CHANGE UP (ref 2–14)
NEUTROPHILS # BLD AUTO: 4.54 K/UL — SIGNIFICANT CHANGE UP (ref 1.8–7.4)
NEUTROPHILS # BLD AUTO: 4.54 K/UL — SIGNIFICANT CHANGE UP (ref 1.8–7.4)
NEUTROPHILS NFR BLD AUTO: 57.5 % — SIGNIFICANT CHANGE UP (ref 43–77)
NEUTROPHILS NFR BLD AUTO: 57.5 % — SIGNIFICANT CHANGE UP (ref 43–77)
NRBC # BLD: 0 /100 WBCS — SIGNIFICANT CHANGE UP (ref 0–0)
NRBC # BLD: 0 /100 WBCS — SIGNIFICANT CHANGE UP (ref 0–0)
PLATELET # BLD AUTO: 182 K/UL — SIGNIFICANT CHANGE UP (ref 150–400)
PLATELET # BLD AUTO: 182 K/UL — SIGNIFICANT CHANGE UP (ref 150–400)
RBC # BLD: 3.6 M/UL — LOW (ref 3.8–5.2)
RBC # BLD: 3.6 M/UL — LOW (ref 3.8–5.2)
RBC # FLD: 19.4 % — HIGH (ref 10.3–14.5)
RBC # FLD: 19.4 % — HIGH (ref 10.3–14.5)
WBC # BLD: 7.9 K/UL — SIGNIFICANT CHANGE UP (ref 3.8–10.5)
WBC # BLD: 7.9 K/UL — SIGNIFICANT CHANGE UP (ref 3.8–10.5)
WBC # FLD AUTO: 7.9 K/UL — SIGNIFICANT CHANGE UP (ref 3.8–10.5)
WBC # FLD AUTO: 7.9 K/UL — SIGNIFICANT CHANGE UP (ref 3.8–10.5)

## 2024-01-03 RX ORDER — DEXAMETHASONE 4 MG/1
4 TABLET ORAL
Qty: 5 | Refills: 2 | Status: ACTIVE | COMMUNITY
Start: 2023-10-25 | End: 1900-01-01

## 2024-01-04 ENCOUNTER — RESULT REVIEW (OUTPATIENT)
Age: 59
End: 2024-01-04

## 2024-01-04 ENCOUNTER — APPOINTMENT (OUTPATIENT)
Dept: HEMATOLOGY ONCOLOGY | Facility: CLINIC | Age: 59
End: 2024-01-04
Payer: COMMERCIAL

## 2024-01-04 ENCOUNTER — APPOINTMENT (OUTPATIENT)
Dept: INFUSION THERAPY | Facility: CANCER CENTER | Age: 59
End: 2024-01-04

## 2024-01-04 VITALS
HEIGHT: 63.23 IN | TEMPERATURE: 97.6 F | DIASTOLIC BLOOD PRESSURE: 71 MMHG | SYSTOLIC BLOOD PRESSURE: 154 MMHG | BODY MASS INDEX: 32.79 KG/M2 | RESPIRATION RATE: 16 BRPM | HEART RATE: 99 BPM | OXYGEN SATURATION: 96 % | WEIGHT: 187.39 LBS

## 2024-01-04 DIAGNOSIS — R93.89 ABNORMAL FINDINGS ON DIAGNOSTIC IMAGING OF OTHER SPECIFIED BODY STRUCTURES: ICD-10-CM

## 2024-01-04 DIAGNOSIS — K64.9 UNSPECIFIED HEMORRHOIDS: ICD-10-CM

## 2024-01-04 LAB
ALBUMIN SERPL ELPH-MCNC: 4.1 G/DL
ALP BLD-CCNC: 119 U/L
ALT SERPL-CCNC: 18 U/L
ANION GAP SERPL CALC-SCNC: 13 MMOL/L
AST SERPL-CCNC: 19 U/L
BILIRUB SERPL-MCNC: 0.4 MG/DL
BUN SERPL-MCNC: 16 MG/DL
CALCIUM SERPL-MCNC: 8.7 MG/DL
CHLORIDE SERPL-SCNC: 102 MMOL/L
CO2 SERPL-SCNC: 25 MMOL/L
CREAT SERPL-MCNC: 1.01 MG/DL
EGFR: 65 ML/MIN/1.73M2
GLUCOSE SERPL-MCNC: 139 MG/DL
MAGNESIUM SERPL-MCNC: 2 MG/DL
PHOSPHATE SERPL-MCNC: 3.4 MG/DL
POTASSIUM SERPL-SCNC: 4.3 MMOL/L
PROT SERPL-MCNC: 7.1 G/DL
SODIUM SERPL-SCNC: 140 MMOL/L

## 2024-01-04 PROCEDURE — 96375 TX/PRO/DX INJ NEW DRUG ADDON: CPT

## 2024-01-04 PROCEDURE — 83735 ASSAY OF MAGNESIUM: CPT

## 2024-01-04 PROCEDURE — 80053 COMPREHEN METABOLIC PANEL: CPT

## 2024-01-04 PROCEDURE — 82570 ASSAY OF URINE CREATININE: CPT

## 2024-01-04 PROCEDURE — 84100 ASSAY OF PHOSPHORUS: CPT

## 2024-01-04 PROCEDURE — 81001 URINALYSIS AUTO W/SCOPE: CPT

## 2024-01-04 PROCEDURE — 84550 ASSAY OF BLOOD/URIC ACID: CPT

## 2024-01-04 PROCEDURE — 86304 IMMUNOASSAY TUMOR CA 125: CPT

## 2024-01-04 PROCEDURE — 85027 COMPLETE CBC AUTOMATED: CPT

## 2024-01-04 PROCEDURE — 81003 URINALYSIS AUTO W/O SCOPE: CPT

## 2024-01-04 PROCEDURE — 99214 OFFICE O/P EST MOD 30 MIN: CPT

## 2024-01-04 PROCEDURE — 84156 ASSAY OF PROTEIN URINE: CPT

## 2024-01-04 PROCEDURE — 96413 CHEMO IV INFUSION 1 HR: CPT

## 2024-01-05 LAB
APPEARANCE UR: CLEAR — SIGNIFICANT CHANGE UP
APPEARANCE UR: CLEAR — SIGNIFICANT CHANGE UP
BACTERIA # UR AUTO: NEGATIVE /HPF — SIGNIFICANT CHANGE UP
BACTERIA # UR AUTO: NEGATIVE /HPF — SIGNIFICANT CHANGE UP
BILIRUB UR-MCNC: NEGATIVE — SIGNIFICANT CHANGE UP
BILIRUB UR-MCNC: NEGATIVE — SIGNIFICANT CHANGE UP
CANCER AG125 SERPL-ACNC: 108 U/ML — HIGH
CANCER AG125 SERPL-ACNC: 108 U/ML — HIGH
CAST: 1 /LPF — SIGNIFICANT CHANGE UP (ref 0–4)
CAST: 1 /LPF — SIGNIFICANT CHANGE UP (ref 0–4)
COLOR SPEC: YELLOW — SIGNIFICANT CHANGE UP
COLOR SPEC: YELLOW — SIGNIFICANT CHANGE UP
CREAT ?TM UR-MCNC: 112 MG/DL — SIGNIFICANT CHANGE UP
CREAT ?TM UR-MCNC: 112 MG/DL — SIGNIFICANT CHANGE UP
DIFF PNL FLD: NEGATIVE — SIGNIFICANT CHANGE UP
DIFF PNL FLD: NEGATIVE — SIGNIFICANT CHANGE UP
GLUCOSE UR QL: NEGATIVE MG/DL — SIGNIFICANT CHANGE UP
GLUCOSE UR QL: NEGATIVE MG/DL — SIGNIFICANT CHANGE UP
KETONES UR-MCNC: NEGATIVE MG/DL — SIGNIFICANT CHANGE UP
KETONES UR-MCNC: NEGATIVE MG/DL — SIGNIFICANT CHANGE UP
LEUKOCYTE ESTERASE UR-ACNC: NEGATIVE — SIGNIFICANT CHANGE UP
LEUKOCYTE ESTERASE UR-ACNC: NEGATIVE — SIGNIFICANT CHANGE UP
NITRITE UR-MCNC: NEGATIVE — SIGNIFICANT CHANGE UP
NITRITE UR-MCNC: NEGATIVE — SIGNIFICANT CHANGE UP
PH UR: 5 — SIGNIFICANT CHANGE UP (ref 5–8)
PH UR: 5 — SIGNIFICANT CHANGE UP (ref 5–8)
PROT ?TM UR-MCNC: 15 MG/DL — HIGH (ref 0–12)
PROT ?TM UR-MCNC: 15 MG/DL — HIGH (ref 0–12)
PROT UR-MCNC: NEGATIVE MG/DL — SIGNIFICANT CHANGE UP
PROT UR-MCNC: NEGATIVE MG/DL — SIGNIFICANT CHANGE UP
PROT/CREAT UR-RTO: 0.1 RATIO — SIGNIFICANT CHANGE UP (ref 0–0.2)
PROT/CREAT UR-RTO: 0.1 RATIO — SIGNIFICANT CHANGE UP (ref 0–0.2)
RBC CASTS # UR COMP ASSIST: 3 /HPF — SIGNIFICANT CHANGE UP (ref 0–4)
RBC CASTS # UR COMP ASSIST: 3 /HPF — SIGNIFICANT CHANGE UP (ref 0–4)
REVIEW: SIGNIFICANT CHANGE UP
REVIEW: SIGNIFICANT CHANGE UP
SP GR SPEC: 1.02 — SIGNIFICANT CHANGE UP (ref 1–1.03)
SP GR SPEC: 1.02 — SIGNIFICANT CHANGE UP (ref 1–1.03)
SQUAMOUS # UR AUTO: 1 /HPF — SIGNIFICANT CHANGE UP (ref 0–5)
SQUAMOUS # UR AUTO: 1 /HPF — SIGNIFICANT CHANGE UP (ref 0–5)
URATE CRY FLD QL MICRO: PRESENT
URATE CRY FLD QL MICRO: PRESENT
UROBILINOGEN FLD QL: 0.2 MG/DL — SIGNIFICANT CHANGE UP (ref 0.2–1)
UROBILINOGEN FLD QL: 0.2 MG/DL — SIGNIFICANT CHANGE UP (ref 0.2–1)
WBC UR QL: 2 /HPF — SIGNIFICANT CHANGE UP (ref 0–5)
WBC UR QL: 2 /HPF — SIGNIFICANT CHANGE UP (ref 0–5)

## 2024-01-08 ENCOUNTER — APPOINTMENT (OUTPATIENT)
Dept: GYNECOLOGIC ONCOLOGY | Facility: CLINIC | Age: 59
End: 2024-01-08
Payer: COMMERCIAL

## 2024-01-08 VITALS
OXYGEN SATURATION: 97 % | RESPIRATION RATE: 16 BRPM | BODY MASS INDEX: 33.13 KG/M2 | HEART RATE: 104 BPM | WEIGHT: 187 LBS | DIASTOLIC BLOOD PRESSURE: 68 MMHG | HEIGHT: 63 IN | SYSTOLIC BLOOD PRESSURE: 102 MMHG

## 2024-01-08 LAB
URATE SERPL-MCNC: 6.3 MG/DL — SIGNIFICANT CHANGE UP (ref 2.5–7)
URATE SERPL-MCNC: 6.3 MG/DL — SIGNIFICANT CHANGE UP (ref 2.5–7)

## 2024-01-08 PROCEDURE — 99214 OFFICE O/P EST MOD 30 MIN: CPT

## 2024-01-08 NOTE — RESULTS/DATA
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  She presents today to discuss options for systemic therapy.  We discussed that management of stage IV ovarian cancer is palliative in nature and goals will be to prolong her life and improve her symptoms.  We discussed systemic therapy prior to any debulking surgery.  We referred her to Dr. Miroslava Perez at [facility] from gynecologic oncology.  We recommended neoadjuvant carbo/taxo/bevacizumab for a total of 6 cycles at this time, followed by maintenance therapy likely with niraparib.  Risks/benefits/side effects of this regimen were discussed in detail.  Recommend treatment w/ carboplatin AUC 5-6 IV q 21 days with taxol 175mg/m2 IV and avastin (bevacizumab) every 21 days for 6 cycles for treatment of stage IV ovarian cancer.  C1 chemotherapy w/ carbo/taxol initiated on 11/2/23. Here today for C2 w/ blas.  Taxol reaction w/ C1 & 2. Continue IV premeds for next cycle. Switched to abrxane w/ C3 with improved tolerance  She is tolerating chemotherapy very well. Grade 1 neuropathy; not significantly bothersome, using cold gloves/boots Reviewed results of repeat PET/CT 12/27/23 with Dr. Prieto, discussed w/ pt and daughter at length Results show partial response to therapy  Ordered US neck for uptake noted on PET'CT She has a follow up with Dr. Miroslava Perez on 1/24/23 (await discussion of next steps, unclear when she will go to surgery) Plan for C5 & 6 thereafter (next office visit ~ 2/1/24) Sent Proctofoam for hemorrhoids, reviewed strategies for management  Foundation testing 10/6/23- TPS 0%, TC 0%, PDL1 0 IZAIAH score 24.5%, HRD Positive- likely benefit from maintenance niraparib  MS-Stable MTAP loss, NF1 mut, TP53 mut, CDKN2A/B loss [employer] germline testing 10/16/23- negative for pathogenic mutations or VUS   All patient questions answered at today's visit. Patient urged to call the office with any questions or concerns.

## 2024-01-08 NOTE — HISTORY OF PRESENT ILLNESS
[de-identified] : Referred by: hospital follow up  Gosia (daughter) [phone], Rosalina (lives in [place])  Her daughter translates per patient preference   Cancer Summary:  DIAGNOSIS: peritoneal carcinomatosis  PROCEDURE AND DATE: Abdominal lesion core biopsy 10/6/23 PATHOLOGY: metastatic carcinoma, the IHC profile is compatible w/ mullerian primary  STAGE: IV Chemotherapy:  C1 Carbo/taxol 11/2/23 C2 Carbo/taxol/blas 11/24/23 (reacted to Taxol) C3 carbo/abraxane/blas 12/14/23 C4 carbo/abraxane/blas 1/4/24 Radiation: N/A Hormonal: N/A STATUS: active, on chemotherapy  Genetics STATUS:  Foundation testing 10/6/23- TPS 0%, TC 0%, PDL1 0 IZAIAH score 24.5%, HRD Positive- likely benefit from maintenance niraparib  MS-Stable MTAP loss, NF1 mut, TP53 mut, CDKN2A/B loss  Ms. Mckeon presented at age 58 in October 2023 for evaluation of advanced ovarian cancer, peritoneal carcinomatosis. The patient has a medical history of HTN, HLD.    Antonella initially presented with her 2 daughters (one of whom is in from [place]). She initially presented to [facility] in July 2023 with SOB, difficulty breathing- found to have a pleural effusion which was attributed to cardiac etiology, s/p thoracentesis. She generaly does not follow with health care providers and was not up to date on any health care maintenance. She was then readmitted to [facility] from 10/1/23 to 10/8/23 for SOB/JACKSON, workup revealed peritoneal carcinomatosis with ascites as well as right sided pleural effusion. She was evaluated by Dr. Karolina Rodriguez and underwent repeat imaging w/ abdomen pelvis CAP w/ contrast on 10/3/23- revealed diffuse peritoneal caricnomatosis, no cystic ovarian lesion, loculated left pelvic sidewall fluid collection/cystic lesion measuring 2.3cm x 1.4cm x 6cm. 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 She presented to discuss options for systemic therapy.   She reported her breathing has improved, remained on lasix. She denied fevers, chills, CP, nausea, vomiting, diarrhea. She does report a poor appetite and early satiety. She has lost 7lb since discharge from the hospital. Her LMP was 4 years ago. Denies any recent vaginal bleeding. She has not seen a gynecologist in many years. She has had 2 prior pregnancies and 2 live births, age at first birth was 23.    Imaging: CT abdomen/pelvis with contrast 10/1/2023-large right pleural effusion with associated right lower lobe collapse, small to moderate abdominal and pelvic ascites without definite omental caking, small cystic left adnexal lesion CT chest w/o contrast 10/7/23-interval right thoracentesis, overall decreased volume and new air component of small right hydropneumothorax, small lung nodules, some new Pelvic ultrasound 10/3/2023-Free pelvic fluid, 5 mm left ovarian cyst  Path:  Abdominal lesion core biopsy 10/6/23- metastatic carcinoma, the IHC profile is compatible w/ mullerian primary    Genetics: sent 10/16/23    HCM: - Colonoscopy: never done  - Gyn: never done  - Mammo: never done  - Lung cancer screen: never done (but had recent chest imaging)  - DEXA: never done    SH: - Occupation: works part time as a   - Living situation: lives in [place] w/ her , 2 children  - Smoking/etoh/illicits: former smoker, quit 2 months ago, denies etoh  - Exercise: not very physically active at this time   FH: - Her father had prostate cancer  [de-identified] : Antonella presents today for follow up on 12/14 for peritoneal carcinomatosis.  Prefers to have daughter function as .  Here today for C4 carbo/abraxane/blas. She is doing very well at today's visit.  Notes discomfort from persistent discomfort from hemorrhoids (denies constipation).  Using cold gloves/boots.  Switched from taxol to abraxane w/ improved tolerance (w/ C3).  Mid-treatment PET/CT 12/27/23 showed partial response to treatment. She denies fevers, chills, CP, SOB, nausea, vomiting, diarrhea, mouth sores, LE swelling at this time.  She has a follow up with Dr. Miroslava Perez on 1/24/23.   Laboratory studies reviewed at today's visit and notable for: WBC 7.90, Hb 9.9, plt 182  Foundation testing 10/6/23- TPS 0%, TC 0%, PDL1 0 IZAIAH score 24.5%, HRD Positive- likely benefit from maintenance niraparib  MS-Stable MTAP loss, NF1 mut, TP53 mut, CDKN2A/B loss

## 2024-01-08 NOTE — PHYSICAL EXAM
[Fully active, able to carry on all pre-disease performance without restriction] : Status 0 - Fully active, able to carry on all pre-disease performance without restriction [Normal] : affect appropriate [de-identified] : generally well appearing female, NAD, pleasant  [de-identified] : Right chest wall port without erythema or fluctuance

## 2024-01-11 ENCOUNTER — APPOINTMENT (OUTPATIENT)
Dept: ULTRASOUND IMAGING | Facility: CLINIC | Age: 59
End: 2024-01-11
Payer: COMMERCIAL

## 2024-01-11 PROCEDURE — 76536 US EXAM OF HEAD AND NECK: CPT

## 2024-01-22 ENCOUNTER — OUTPATIENT (OUTPATIENT)
Dept: OUTPATIENT SERVICES | Facility: HOSPITAL | Age: 59
LOS: 1 days | End: 2024-01-22
Payer: MEDICAID

## 2024-01-22 VITALS
SYSTOLIC BLOOD PRESSURE: 125 MMHG | WEIGHT: 182.98 LBS | OXYGEN SATURATION: 97 % | HEART RATE: 84 BPM | RESPIRATION RATE: 16 BRPM | TEMPERATURE: 97 F | HEIGHT: 62 IN | DIASTOLIC BLOOD PRESSURE: 80 MMHG

## 2024-01-22 DIAGNOSIS — Z01.818 ENCOUNTER FOR OTHER PREPROCEDURAL EXAMINATION: ICD-10-CM

## 2024-01-22 DIAGNOSIS — Z91.89 OTHER SPECIFIED PERSONAL RISK FACTORS, NOT ELSEWHERE CLASSIFIED: ICD-10-CM

## 2024-01-22 DIAGNOSIS — Z98.890 OTHER SPECIFIED POSTPROCEDURAL STATES: Chronic | ICD-10-CM

## 2024-01-22 DIAGNOSIS — Z13.89 ENCOUNTER FOR SCREENING FOR OTHER DISORDER: ICD-10-CM

## 2024-01-22 DIAGNOSIS — I10 ESSENTIAL (PRIMARY) HYPERTENSION: ICD-10-CM

## 2024-01-22 DIAGNOSIS — C56.9 MALIGNANT NEOPLASM OF UNSPECIFIED OVARY: ICD-10-CM

## 2024-01-22 DIAGNOSIS — Z78.9 OTHER SPECIFIED HEALTH STATUS: ICD-10-CM

## 2024-01-22 DIAGNOSIS — Z29.9 ENCOUNTER FOR PROPHYLACTIC MEASURES, UNSPECIFIED: ICD-10-CM

## 2024-01-22 LAB
A1C WITH ESTIMATED AVERAGE GLUCOSE RESULT: 5.2 % — SIGNIFICANT CHANGE UP (ref 4–5.6)
ANION GAP SERPL CALC-SCNC: 12 MMOL/L — SIGNIFICANT CHANGE UP (ref 5–17)
APTT BLD: 31.7 SEC — SIGNIFICANT CHANGE UP (ref 24.5–35.6)
BASOPHILS # BLD AUTO: 0.05 K/UL — SIGNIFICANT CHANGE UP (ref 0–0.2)
BASOPHILS NFR BLD AUTO: 0.6 % — SIGNIFICANT CHANGE UP (ref 0–2)
BLD GP AB SCN SERPL QL: SIGNIFICANT CHANGE UP
BUN SERPL-MCNC: 17.7 MG/DL — SIGNIFICANT CHANGE UP (ref 8–20)
CALCIUM SERPL-MCNC: 9.4 MG/DL — SIGNIFICANT CHANGE UP (ref 8.4–10.5)
CANCER AG125 SERPL-ACNC: 62 U/ML — HIGH
CHLORIDE SERPL-SCNC: 100 MMOL/L — SIGNIFICANT CHANGE UP (ref 96–108)
CO2 SERPL-SCNC: 27 MMOL/L — SIGNIFICANT CHANGE UP (ref 22–29)
CREAT SERPL-MCNC: 0.93 MG/DL — SIGNIFICANT CHANGE UP (ref 0.5–1.3)
EGFR: 72 ML/MIN/1.73M2 — SIGNIFICANT CHANGE UP
EOSINOPHIL # BLD AUTO: 0.01 K/UL — SIGNIFICANT CHANGE UP (ref 0–0.5)
EOSINOPHIL NFR BLD AUTO: 0.1 % — SIGNIFICANT CHANGE UP (ref 0–6)
ESTIMATED AVERAGE GLUCOSE: 103 MG/DL — SIGNIFICANT CHANGE UP (ref 68–114)
GLUCOSE SERPL-MCNC: 97 MG/DL — SIGNIFICANT CHANGE UP (ref 70–99)
HCT VFR BLD CALC: 28.6 % — LOW (ref 34.5–45)
HGB BLD-MCNC: 9.1 G/DL — LOW (ref 11.5–15.5)
IMM GRANULOCYTES NFR BLD AUTO: 0.6 % — SIGNIFICANT CHANGE UP (ref 0–0.9)
INR BLD: 1.01 RATIO — SIGNIFICANT CHANGE UP (ref 0.85–1.18)
LYMPHOCYTES # BLD AUTO: 1.9 K/UL — SIGNIFICANT CHANGE UP (ref 1–3.3)
LYMPHOCYTES # BLD AUTO: 24.1 % — SIGNIFICANT CHANGE UP (ref 13–44)
MCHC RBC-ENTMCNC: 27.9 PG — SIGNIFICANT CHANGE UP (ref 27–34)
MCHC RBC-ENTMCNC: 31.8 GM/DL — LOW (ref 32–36)
MCV RBC AUTO: 87.7 FL — SIGNIFICANT CHANGE UP (ref 80–100)
MONOCYTES # BLD AUTO: 0.65 K/UL — SIGNIFICANT CHANGE UP (ref 0–0.9)
MONOCYTES NFR BLD AUTO: 8.2 % — SIGNIFICANT CHANGE UP (ref 2–14)
NEUTROPHILS # BLD AUTO: 5.23 K/UL — SIGNIFICANT CHANGE UP (ref 1.8–7.4)
NEUTROPHILS NFR BLD AUTO: 66.4 % — SIGNIFICANT CHANGE UP (ref 43–77)
PLATELET # BLD AUTO: 199 K/UL — SIGNIFICANT CHANGE UP (ref 150–400)
POTASSIUM SERPL-MCNC: 4.3 MMOL/L — SIGNIFICANT CHANGE UP (ref 3.5–5.3)
POTASSIUM SERPL-SCNC: 4.3 MMOL/L — SIGNIFICANT CHANGE UP (ref 3.5–5.3)
PROTHROM AB SERPL-ACNC: 11.2 SEC — SIGNIFICANT CHANGE UP (ref 9.5–13)
RBC # BLD: 3.26 M/UL — LOW (ref 3.8–5.2)
RBC # FLD: 21.1 % — HIGH (ref 10.3–14.5)
SODIUM SERPL-SCNC: 139 MMOL/L — SIGNIFICANT CHANGE UP (ref 135–145)
T3 SERPL-MCNC: 129 NG/DL — SIGNIFICANT CHANGE UP (ref 80–200)
T4 AB SER-ACNC: 7.7 UG/DL — SIGNIFICANT CHANGE UP (ref 4.5–12)
TSH SERPL-MCNC: 1.56 UIU/ML — SIGNIFICANT CHANGE UP (ref 0.27–4.2)
WBC # BLD: 7.89 K/UL — SIGNIFICANT CHANGE UP (ref 3.8–10.5)
WBC # FLD AUTO: 7.89 K/UL — SIGNIFICANT CHANGE UP (ref 3.8–10.5)

## 2024-01-22 PROCEDURE — 93005 ELECTROCARDIOGRAM TRACING: CPT

## 2024-01-22 PROCEDURE — 93010 ELECTROCARDIOGRAM REPORT: CPT

## 2024-01-22 PROCEDURE — 71046 X-RAY EXAM CHEST 2 VIEWS: CPT | Mod: 26

## 2024-01-22 PROCEDURE — 71046 X-RAY EXAM CHEST 2 VIEWS: CPT

## 2024-01-22 PROCEDURE — G0463: CPT

## 2024-01-22 RX ORDER — METRONIDAZOLE 500 MG
500 TABLET ORAL ONCE
Refills: 0 | Status: COMPLETED | OUTPATIENT
Start: 2024-02-06 | End: 2024-02-06

## 2024-01-22 NOTE — H&P PST ADULT - EKG AND INTERPRETATION
unofficial reading unofficial reading-NSR VR 75 bpm  incomplete left BBB, left ventricular hypertrophy, prolonged QT

## 2024-01-22 NOTE — H&P PST ADULT - HISTORY OF PRESENT ILLNESS
Patient is a 58 year old  female , LMP 4-5 years ago presenting today for PST, PMH includes HTN and HLD. currently recieving chemotherapy through right chest wall port   Patient recently diagnosed with ovarian cancer.   She denies vaginal bleeding/discharge, abdominal pain/bloating/distension, pelvis discomfort, changes in normal bowel/urinary habits, nausea/vomiting, unintentional weight loss/gain, and all other associated signs and symptoms. She admits to some pain/difficulties with deep inspiration.     Patient initially presented to [facility] in 2023 with SOB, difficulty breathing, was found to have pleural effusion at that time which was attributed to cardiac etiology, s/p thoracentesis. She then was readmitted to [facility] from 10/1/2023 to 10/8/23 for SOB/JACKSON, workup revealed peritoneal carcinomatosis with ascites as well as right sided pleural effusion.    CAP w/ contrast on 10/3/23- revealed diffuse peritoneal carcinomatosis, no cystic ovarian lesion, loculated left pelvic sidewall fluid collection/cystic lesion measuring 2.3cm x 1.4cm x 6cm.    CT chest w/o contrast 10/7/23-interval right thoracentesis, overall decreased volume and new air component of small right hydropneumothorax, small lung nodules, some new    Pelvic ultrasound 10/3/2023-Free pelvic fluid, 5 mm left ovarian cyst    10/6/23 Abdominal lesion, core biopsy: Metastatic adenocarcinoma. The immunohistochemical profile is compatible with mullerian primary.     10/16/23 : 1,364 (H)   Patient is a 58 year old  female , LMP 4-5 years ago accompanied by daughter presenting today for PST, PMH includes HTN and HLD, currently receiving chemotherapy through right chest wall port. As per daughter patient was recently diagnosed with ovarian cancer after incidental finding while being worked up for shortness of breath. As per chart "patient initially presented to [facility] in 2023 with SOB, difficulty breathing, was found to have pleural effusion at that time which was attributed to cardiac etiology, s/p thoracentesis. She then was readmitted to [facility] from 10/1/2023 to 10/8/23 for SOB/JACKSON, workup revealed peritoneal carcinomatosis with ascites as well as right sided pleural effusion".  Reports some intermittent left sided abdominal pain. She denies vaginal bleeding, discharge, PMB, bloating, pelvis discomfort, changes in normal bowel/urinary habits, nausea, vomiting, or unintentional weight loss/gain. and all other associated signs and symptoms. She admits to some pain/difficulties with deep inspiration. She is now scheduled for   CAP w/ contrast on 10/3/23- revealed diffuse peritoneal carcinomatosis, no cystic ovarian lesion, loculated left pelvic sidewall fluid collection/cystic lesion measuring 2.3cm x 1.4cm x 6cm.    CT chest w/o contrast 10/7/23-interval right thoracentesis, overall decreased volume and new air component of small right hydropneumothorax, small lung nodules, some new    Pelvic ultrasound 10/3/2023-Free pelvic fluid, 5 mm left ovarian cyst    10/6/23 Abdominal lesion, core biopsy: Metastatic adenocarcinoma. The immunohistochemical profile is compatible with mullerian primary.      Patient is a 58 year old  female , LMP 4-5 years ago accompanied by daughter presenting today for PST, PMH includes HTN and HLD, currently receiving chemotherapy through right chest wall port. As per daughter patient was recently diagnosed with ovarian cancer after incidental finding while being worked up for shortness of breath. As per chart "patient initially presented to [facility] in 2023 with SOB, difficulty breathing, was found to have pleural effusion at that time which was attributed to cardiac etiology, s/p thoracentesis. She then was readmitted to [facility] from 10/1/2023 to 10/8/23 for SOB/JACKSON, workup revealed peritoneal carcinomatosis with ascites as well as right sided pleural effusion".  Reports some intermittent left sided abdominal pain. She denies vaginal bleeding, discharge, PMB, bloating, pelvis discomfort, changes in normal bowel/urinary habits, nausea, vomiting, or unintentional weight loss/gain. and all other associated signs and symptoms. She admits to some pain/difficulties with deep inspiration. She is now scheduled for exploratory laparomtomy, abdominal hysterectomy, bilateral salpingo-oophorectomy, cytoreductive surgery, HIPEC and other indicated procedures with Dr. Fernando on 2024 pending medical and cardiac clearance.    As per chart:    CAP w/ contrast on 10/3/23- revealed diffuse peritoneal carcinomatosis, no cystic ovarian lesion, loculated left pelvic sidewall fluid collection/cystic lesion measuring 2.3cm x 1.4cm x 6cm.    CT chest w/o contrast 10/7/23-interval right thoracentesis, overall decreased volume and new air component of small right hydropneumothorax, small lung nodules, some new    Pelvic ultrasound 10/3/2023-Free pelvic fluid, 5 mm left ovarian cyst    10/6/23 Abdominal lesion, core biopsy: Metastatic adenocarcinoma. The immunohistochemical profile is compatible with mullerian primary.      Patient is a 58 year old  female , LMP 4-5 years ago accompanied by daughter presenting today for PST, PMH includes pleural effusion s/p thoracentesis HTN and HLD, currently receiving chemotherapy through right chest wall port. As per daughter patient was recently diagnosed with ovarian cancer after incidental finding while being worked up for shortness of breath. As per chart "patient initially presented to [facility] in 2023 with SOB, difficulty breathing, was found to have pleural effusion at that time which was attributed to cardiac etiology, s/p thoracentesis. She then was readmitted to [facility] from 10/1/2023 to 10/8/23 for SOB/JACKSON, workup revealed peritoneal carcinomatosis with ascites as well as right sided pleural effusion".  Reports some intermittent left sided abdominal pain and bloating. She denies vaginal bleeding, discharge, PMB,  pelvis discomfort, changes in normal bowel/urinary habits, nausea, vomiting, or unintentional weight loss/gain. She is now scheduled for exploratory laparotomy abdominal hysterectomy, bilateral salpingo-oophorectomy, cytoreductive surgery, HIPEC and other indicated procedures with Dr. Fernando on 2024 pending medical and cardiac clearance.    As per chart:    CAP w/ contrast on 10/3/23- revealed diffuse peritoneal carcinomatosis, no cystic ovarian lesion, loculated left pelvic sidewall fluid collection/cystic lesion measuring 2.3cm x 1.4cm x 6cm.    CT chest w/o contrast 10/7/23-interval right thoracentesis, overall decreased volume and new air component of small right hydropneumothorax, small lung nodules, some new    Pelvic ultrasound 10/3/2023-Free pelvic fluid, 5 mm left ovarian cyst    10/6/23 Abdominal lesion, core biopsy: Metastatic adenocarcinoma. The immunohistochemical profile is compatible with mullerian primary.      Patient is a 58 year old  female , LMP 4-5 years ago accompanied by daughter presenting today for PST, PMH includes pleural effusion s/p thoracentesis HTN and HLD, currently receiving chemotherapy through right chest wall port. As per daughter patient was recently diagnosed with ovarian cancer after incidental finding while being worked up for shortness of breath. As per chart "patient initially presented to [facility] in 2023 with SOB, difficulty breathing, was found to have pleural effusion at that time which was attributed to cardiac etiology, s/p thoracentesis. She then was readmitted to [facility] from 10/1/2023 to 10/8/23 for SOB/JACKSON, workup revealed peritoneal carcinomatosis with ascites as well as right sided pleural effusion".  Reports some intermittent left sided abdominal pain and bloating. She denies vaginal bleeding, discharge, PMB,  pelvis discomfort, changes in normal bowel/urinary habits, nausea, vomiting, or unintentional weight loss/gain. She is now scheduled for exploratory laparotomy abdominal hysterectomy, bilateral salpingo-oophorectomy, cytoreductive surgery, HIPEC and other indicated procedures with Dr. Perez on 2024 pending medical and cardiac clearance.    As per chart:    CAP w/ contrast on 10/3/23- revealed diffuse peritoneal carcinomatosis, no cystic ovarian lesion, loculated left pelvic sidewall fluid collection/cystic lesion measuring 2.3cm x 1.4cm x 6cm.    CT chest w/o contrast 10/7/23-interval right thoracentesis, overall decreased volume and new air component of small right hydropneumothorax, small lung nodules, some new    Pelvic ultrasound 10/3/2023-Free pelvic fluid, 5 mm left ovarian cyst    10/6/23 Abdominal lesion, core biopsy: Metastatic adenocarcinoma. The immunohistochemical profile is compatible with mullerian primary.

## 2024-01-22 NOTE — H&P PST ADULT - NEGATIVE GENERAL GENITOURINARY SYMPTOMS
no hematuria/no renal colic/no flank pain L/no flank pain R/no incontinence/no dysuria/normal urinary frequency/no nocturia

## 2024-01-22 NOTE — H&P PST ADULT - PROBLEM SELECTOR PLAN 1
Labs, EKG and CXR performed.  Scheduled for exploratory laparotomy abdominal hysterectomy, bilateral salpingo-oophorectomy, cytoreductive surgery, HIPEC and other indicated procedures with Dr. Fernando on 2/6/2024 pending medical and cardiac clearance.  Patient to have medical clearance with Dr. Dunphy  Patient to have cardiac clearance with Dr. Silva  Written and verbal instructions provided.  Patient and daughter educated on surgical scrub, preadmission instructions, liquids before surgery, clearances and day of procedure medications, verbalizes understanding.  Patient and daughter instructed to complete bowel prep as instructed by Dr. Perez's office.  Patient instructed to stop vitamins/supplements/herbal medications/ASA/NSAIDS for one week prior to surgery and discuss with PMD, verbalized understanding.  Patient and daughter  verbalized understanding of instructions and was given the opportunity to ask questions and have them answered.  Out patient medications reviewed and verified with patient.

## 2024-01-22 NOTE — H&P PST ADULT - PROBLEM SELECTOR PLAN 2
BP today 125/80  Continue medication.   EKG performed.  Patient and daughter  instructed to take morning blood pressure medications with a sip of water verbalized understanding.   Medical and cardiac clearance pending.

## 2024-01-22 NOTE — H&P PST ADULT - MUSCULOSKELETAL
details… no calf tenderness/normal gait/strength 5/5 bilateral upper extremities/strength 5/5 bilateral lower extremities

## 2024-01-22 NOTE — H&P PST ADULT - NSICDXPASTSURGICALHX_GEN_ALL_CORE_FT
PAST SURGICAL HISTORY:  No significant past surgical history      PAST SURGICAL HISTORY:  History of thoracentesis

## 2024-01-22 NOTE — H&P PST ADULT - ASSESSMENT
This is a pleasant Polish speaking 58 year old  female in [place] , LMP 4-5 years ago accompanied by daughter presenting today for PST, PMH includes pleural effusion s/p thoracentesis HTN and HLD, currently receiving chemotherapy through right chest wall port. As per daughter patient was recently diagnosed with ovarian cancer after incidental finding while being worked up for shortness of breath. As per chart "patient initially presented to [facility] in 2023 with SOB, difficulty breathing, was found to have pleural effusion at that time which was attributed to cardiac etiology, s/p thoracentesis. 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  Reports some intermittent left sided abdominal pain and bloating. She denies vaginal bleeding, discharge, PMB,  pelvis discomfort, changes in normal bowel/urinary habits, nausea, vomiting, or unintentional weight loss/gain. She is now scheduled for exploratory laparotomy abdominal hysterectomy, bilateral salpingo-oophorectomy, cytoreductive surgery, HIPEC and other indicated procedures with Dr. Fernando on 2024 pending medical and cardiac clearance.    CAPRINI SCORE    AGE RELATED RISK FACTORS                                                             [X ] Age 41-60 years                                            (1 Point)  [ ] Age: 61-74 years                                           (2 Points)                 [ ] Age= 75 years                                                (3 Points)             DISEASE RELATED RISK FACTORS                                                       [ ] Edema in the lower extremities                 (1 Point)                     [ ] Varicose veins                                               (1 Point)                                 [X ] BMI > 25 Kg/m2                                            (1 Point)                                  [ ] Serious infection (ie PNA)                            (1 Point)                     [ ] Lung disease ( COPD, Emphysema)            (1 Point)                                                                          [ ] Acute myocardial infarction                         (1 Point)                  [ ] Congestive heart failure (in the previous month)  (1 Point)         [ ] Inflammatory bowel disease                            (1 Point)                  [ ] Central venous access, PICC or Port               (2 points)       (within the last month)                                                                [ ] Stroke (in the previous month)                        (5 Points)    [X ] Previous or present malignancy                       (2 points)                                                                                                                                                         HEMATOLOGY RELATED FACTORS                                                         [ ] Prior episodes of VTE                                     (3 Points)                     [ ] Positive family history for VTE                      (3 Points)                  [ ] Prothrombin 17502 A                                     (3 Points)                     [ ] Factor V Leiden                                                (3 Points)                        [ ] Lupus anticoagulants                                      (3 Points)                                                           [ ] Anticardiolipin antibodies                              (3 Points)                                                       [ ] High homocysteine in the blood                   (3 Points)                                             [ ] Other congenital or acquired thrombophilia      (3 Points)                                                [ ] Heparin induced thrombocytopenia                  (3 Points)                                        MOBILITY RELATED FACTORS  [ ] Bed rest                                                         (1 Point)  [ ] Plaster cast                                                    (2 points)  [ ] Bed bound for more than 72 hours           (2 Points)    GENDER SPECIFIC FACTORS  [ ] Pregnancy or had a baby within the last month   (1 Point)  [ ] Post-partum < 6 weeks                                   (1 Point)  [ ] Hormonal therapy  or oral contraception   (1 Point)  [ ] History of pregnancy complications              (1 point)  [ ] Unexplained or recurrent              (1 Point)    OTHER RISK FACTORS                                           (1 Point)  [X ] BMI >40, smoking, diabetes requiring insulin, chemotherapy  blood transfusions and length of surgery over 2 hours    SURGERY RELATED RISK FACTORS  [ ]  Section within the last month     (1 Point)  [ ] Minor surgery                                                  (1 Point)  [ ] Arthroscopic surgery                                       (2 Points)  [ X] Planned major surgery lasting more            (2 Points)      than 45 minutes     [ ] Elective hip or knee joint replacement       (5 points)       surgery                                                TRAUMA RELATED RISK FACTORS  [ ] Fracture of the hip, pelvis, or leg                       (5 Points)  [ ] Spinal cord injury resulting in paralysis             (5 points)       (in the previous month)    [ ] Paralysis  (less than 1 month)                             (5 Points)  [ ] Multiple Trauma within 1 month                        (5 Points)    Total Score [    7    ]    Caprini Score 0-2: Low Risk, NO VTE prophylaxis required for most patients, encourage ambulation  Caprini Score 3-6: Moderate Risk , pharmacologic VTE prophylaxis is indicated for most patients (in the absence of contraindications)  Caprini Score Greater than or =7: High risk, pharmocologic VTE prophylaxis indicated for most patients (in the absence of contraindications)      OPIOID RISK TOOL    THEODORA EACH BOX THAT APPLIES AND ADD TOTALS AT THE END    FAMILY HISTORY OF SUBSTANCE ABUSE                 FEMALE         MALE                                                Alcohol                             [  ]1 pt          [  ]3pts                                               Illegal Durgs                     [  ]2 pts        [  ]3pts                                               Rx Drugs                           [  ]4 pts        [  ]4 pts    PERSONAL HISTORY OF SUBSTANCE ABUSE                                                                                          Alcohol                             [  ]3 pts       [  ]3 pts                                               Illegal Drugs                     [  ]4 pts        [  ]4 pts                                               Rx Drugs                           [  ]5 pts        [  ]5 pts    AGE BETWEEN 16-45 YEARS                                      [  ]1 pt         [  ]1 pt    HISTORY OF PREADOLESCENT   SEXUAL ABUSE                                                             [  ]3 pts        [  ]0pts    PSYCHOLOGICAL DISEASE                     ADD, OCD, Bipolar, Schizophrenia        [  ]2 pts         [  ]2 pts                      Depression                                               [  ]1 pt           [  ]1 pt           SCORING TOTAL   (add numbers and type here)              (*0**)                                     A score of 3 or lower indicated LOW risk for future opioid abuse  A score of 4 to 7 indicated moderate risk for future opioid abuse  A score of 8 or higher indicates a high risk for opioid abuse                             This is a pleasant Polish speaking 58 year old  female in [place] , LMP 4-5 years ago accompanied by daughter presenting today for PST, PMH includes pleural effusion s/p thoracentesis HTN and HLD, currently receiving chemotherapy through right chest wall port. As per daughter patient was recently diagnosed with ovarian cancer after incidental finding while being worked up for shortness of breath. As per chart "patient initially presented to [facility] [place] in 2023 with SOB, difficulty breathing, was found to have pleural effusion at that time which was attributed to cardiac etiology, s/p thoracentesis. 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  Reports some intermittent left sided abdominal pain and bloating. She denies vaginal bleeding, discharge, PMB,  pelvis discomfort, changes in normal bowel/urinary habits, nausea, vomiting, or unintentional weight loss/gain. She is now scheduled for exploratory laparotomy abdominal hysterectomy, bilateral salpingo-oophorectomy, cytoreductive surgery, HIPEC and other indicated procedures with Dr. Perez on 2024 pending medical and cardiac clearance.    CAPRINI SCORE    AGE RELATED RISK FACTORS                                                             [X ] Age 41-60 years                                            (1 Point)  [ ] Age: 61-74 years                                           (2 Points)                 [ ] Age= 75 years                                                (3 Points)             DISEASE RELATED RISK FACTORS                                                       [ ] Edema in the lower extremities                 (1 Point)                     [ ] Varicose veins                                               (1 Point)                                 [X ] BMI > 25 Kg/m2                                            (1 Point)                                  [ ] Serious infection (ie PNA)                            (1 Point)                     [ ] Lung disease ( COPD, Emphysema)            (1 Point)                                                                          [ ] Acute myocardial infarction                         (1 Point)                  [ ] Congestive heart failure (in the previous month)  (1 Point)         [ ] Inflammatory bowel disease                            (1 Point)                  [ ] Central venous access, PICC or Port               (2 points)       (within the last month)                                                                [ ] Stroke (in the previous month)                        (5 Points)    [X ] Previous or present malignancy                       (2 points)                                                                                                                                                         HEMATOLOGY RELATED FACTORS                                                         [ ] Prior episodes of VTE                                     (3 Points)                     [ ] Positive family history for VTE                      (3 Points)                  [ ] Prothrombin 93586 A                                     (3 Points)                     [ ] Factor V Leiden                                                (3 Points)                        [ ] Lupus anticoagulants                                      (3 Points)                                                           [ ] Anticardiolipin antibodies                              (3 Points)                                                       [ ] High homocysteine in the blood                   (3 Points)                                             [ ] Other congenital or acquired thrombophilia      (3 Points)                                                [ ] Heparin induced thrombocytopenia                  (3 Points)                                        MOBILITY RELATED FACTORS  [ ] Bed rest                                                         (1 Point)  [ ] Plaster cast                                                    (2 points)  [ ] Bed bound for more than 72 hours           (2 Points)    GENDER SPECIFIC FACTORS  [ ] Pregnancy or had a baby within the last month   (1 Point)  [ ] Post-partum < 6 weeks                                   (1 Point)  [ ] Hormonal therapy  or oral contraception   (1 Point)  [ ] History of pregnancy complications              (1 point)  [ ] Unexplained or recurrent              (1 Point)    OTHER RISK FACTORS                                           (1 Point)  [X ] BMI >40, smoking, diabetes requiring insulin, chemotherapy  blood transfusions and length of surgery over 2 hours    SURGERY RELATED RISK FACTORS  [ ]  Section within the last month     (1 Point)  [ ] Minor surgery                                                  (1 Point)  [ ] Arthroscopic surgery                                       (2 Points)  [ X] Planned major surgery lasting more            (2 Points)      than 45 minutes     [ ] Elective hip or knee joint replacement       (5 points)       surgery                                                TRAUMA RELATED RISK FACTORS  [ ] Fracture of the hip, pelvis, or leg                       (5 Points)  [ ] Spinal cord injury resulting in paralysis             (5 points)       (in the previous month)    [ ] Paralysis  (less than 1 month)                             (5 Points)  [ ] Multiple Trauma within 1 month                        (5 Points)    Total Score [    7    ]    Caprini Score 0-2: Low Risk, NO VTE prophylaxis required for most patients, encourage ambulation  Caprini Score 3-6: Moderate Risk , pharmacologic VTE prophylaxis is indicated for most patients (in the absence of contraindications)  Caprini Score Greater than or =7: High risk, pharmocologic VTE prophylaxis indicated for most patients (in the absence of contraindications)      OPIOID RISK TOOL    THEODORA EACH BOX THAT APPLIES AND ADD TOTALS AT THE END    FAMILY HISTORY OF SUBSTANCE ABUSE                 FEMALE         MALE                                                Alcohol                             [  ]1 pt          [  ]3pts                                               Illegal Durgs                     [  ]2 pts        [  ]3pts                                               Rx Drugs                           [  ]4 pts        [  ]4 pts    PERSONAL HISTORY OF SUBSTANCE ABUSE                                                                                          Alcohol                             [  ]3 pts       [  ]3 pts                                               Illegal Drugs                     [  ]4 pts        [  ]4 pts                                               Rx Drugs                           [  ]5 pts        [  ]5 pts    AGE BETWEEN 16-45 YEARS                                      [  ]1 pt         [  ]1 pt    HISTORY OF PREADOLESCENT   SEXUAL ABUSE                                                             [  ]3 pts        [  ]0pts    PSYCHOLOGICAL DISEASE                     ADD, OCD, Bipolar, Schizophrenia        [  ]2 pts         [  ]2 pts                      Depression                                               [  ]1 pt           [  ]1 pt           SCORING TOTAL   (add numbers and type here)              (*0**)                                     A score of 3 or lower indicated LOW risk for future opioid abuse  A score of 4 to 7 indicated moderate risk for future opioid abuse  A score of 8 or higher indicates a high risk for opioid abuse

## 2024-01-22 NOTE — H&P PST ADULT - COMMENTS
patient was informed for health maintenance for mammogram, states once she deals with cancer she will follow-up for mammogram

## 2024-01-26 ENCOUNTER — APPOINTMENT (OUTPATIENT)
Dept: INFUSION THERAPY | Facility: CANCER CENTER | Age: 59
End: 2024-01-26

## 2024-01-30 PROBLEM — Z87.09 PERSONAL HISTORY OF OTHER DISEASES OF THE RESPIRATORY SYSTEM: Chronic | Status: ACTIVE | Noted: 2024-01-22

## 2024-01-30 PROBLEM — I10 ESSENTIAL (PRIMARY) HYPERTENSION: Chronic | Status: ACTIVE | Noted: 2024-01-22

## 2024-01-30 PROBLEM — E78.00 PURE HYPERCHOLESTEROLEMIA, UNSPECIFIED: Chronic | Status: ACTIVE | Noted: 2024-01-22

## 2024-01-31 ENCOUNTER — OUTPATIENT (OUTPATIENT)
Dept: OUTPATIENT SERVICES | Facility: HOSPITAL | Age: 59
LOS: 1 days | End: 2024-01-31
Payer: SELF-PAY

## 2024-01-31 DIAGNOSIS — D64.9 ANEMIA, UNSPECIFIED: ICD-10-CM

## 2024-01-31 DIAGNOSIS — Z98.890 OTHER SPECIFIED POSTPROCEDURAL STATES: Chronic | ICD-10-CM

## 2024-01-31 PROCEDURE — 96365 THER/PROPH/DIAG IV INF INIT: CPT

## 2024-01-31 RX ORDER — IRON SUCROSE 20 MG/ML
100 INJECTION, SOLUTION INTRAVENOUS ONCE
Refills: 0 | Status: COMPLETED | OUTPATIENT
Start: 2024-01-31 | End: 2024-01-31

## 2024-01-31 RX ADMIN — IRON SUCROSE 210 MILLIGRAM(S): 20 INJECTION, SOLUTION INTRAVENOUS at 13:09

## 2024-02-06 ENCOUNTER — TRANSCRIPTION ENCOUNTER (OUTPATIENT)
Age: 59
End: 2024-02-06

## 2024-02-06 ENCOUNTER — INPATIENT (INPATIENT)
Facility: HOSPITAL | Age: 59
LOS: 14 days | Discharge: HOME HEALTH | DRG: 737 | End: 2024-02-21
Attending: OBSTETRICS & GYNECOLOGY | Admitting: OBSTETRICS & GYNECOLOGY
Payer: MEDICAID

## 2024-02-06 ENCOUNTER — RESULT REVIEW (OUTPATIENT)
Age: 59
End: 2024-02-06

## 2024-02-06 VITALS
OXYGEN SATURATION: 98 % | HEART RATE: 79 BPM | HEIGHT: 62.01 IN | TEMPERATURE: 98 F | RESPIRATION RATE: 16 BRPM | WEIGHT: 182.98 LBS

## 2024-02-06 DIAGNOSIS — C56.9 MALIGNANT NEOPLASM OF UNSPECIFIED OVARY: ICD-10-CM

## 2024-02-06 DIAGNOSIS — Z98.890 OTHER SPECIFIED POSTPROCEDURAL STATES: Chronic | ICD-10-CM

## 2024-02-06 LAB
ABO RH CONFIRMATION: SIGNIFICANT CHANGE UP
ALBUMIN SERPL ELPH-MCNC: 2.6 G/DL — LOW (ref 3.3–5.2)
ALP SERPL-CCNC: 62 U/L — SIGNIFICANT CHANGE UP (ref 40–120)
ALT FLD-CCNC: 86 U/L — HIGH
ANION GAP SERPL CALC-SCNC: 15 MMOL/L — SIGNIFICANT CHANGE UP (ref 5–17)
AST SERPL-CCNC: 84 U/L — HIGH
BASOPHILS # BLD AUTO: 0.02 K/UL — SIGNIFICANT CHANGE UP (ref 0–0.2)
BASOPHILS # BLD AUTO: 0.06 K/UL — SIGNIFICANT CHANGE UP (ref 0–0.2)
BASOPHILS NFR BLD AUTO: 0.2 % — SIGNIFICANT CHANGE UP (ref 0–2)
BASOPHILS NFR BLD AUTO: 1 % — SIGNIFICANT CHANGE UP (ref 0–2)
BILIRUB SERPL-MCNC: 0.5 MG/DL — SIGNIFICANT CHANGE UP (ref 0.4–2)
BUN SERPL-MCNC: 15.2 MG/DL — SIGNIFICANT CHANGE UP (ref 8–20)
CALCIUM SERPL-MCNC: 7.8 MG/DL — LOW (ref 8.4–10.5)
CHLORIDE SERPL-SCNC: 111 MMOL/L — HIGH (ref 96–108)
CO2 SERPL-SCNC: 16 MMOL/L — LOW (ref 22–29)
CREAT SERPL-MCNC: 1.04 MG/DL — SIGNIFICANT CHANGE UP (ref 0.5–1.3)
EGFR: 63 ML/MIN/1.73M2 — SIGNIFICANT CHANGE UP
EOSINOPHIL # BLD AUTO: 0 K/UL — SIGNIFICANT CHANGE UP (ref 0–0.5)
EOSINOPHIL # BLD AUTO: 0.12 K/UL — SIGNIFICANT CHANGE UP (ref 0–0.5)
EOSINOPHIL NFR BLD AUTO: 0 % — SIGNIFICANT CHANGE UP (ref 0–6)
EOSINOPHIL NFR BLD AUTO: 1.9 % — SIGNIFICANT CHANGE UP (ref 0–6)
GAS PNL BLDA: SIGNIFICANT CHANGE UP
GLUCOSE SERPL-MCNC: 171 MG/DL — HIGH (ref 70–99)
HCT VFR BLD CALC: 27.3 % — LOW (ref 34.5–45)
HCT VFR BLD CALC: 30.9 % — LOW (ref 34.5–45)
HGB BLD-MCNC: 10.6 G/DL — LOW (ref 11.5–15.5)
HGB BLD-MCNC: 9.4 G/DL — LOW (ref 11.5–15.5)
IMM GRANULOCYTES NFR BLD AUTO: 0.2 % — SIGNIFICANT CHANGE UP (ref 0–0.9)
IMM GRANULOCYTES NFR BLD AUTO: 0.5 % — SIGNIFICANT CHANGE UP (ref 0–0.9)
LACTATE SERPL-SCNC: 3.6 MMOL/L — HIGH (ref 0.5–2)
LYMPHOCYTES # BLD AUTO: 0.58 K/UL — LOW (ref 1–3.3)
LYMPHOCYTES # BLD AUTO: 1.76 K/UL — SIGNIFICANT CHANGE UP (ref 1–3.3)
LYMPHOCYTES # BLD AUTO: 27.9 % — SIGNIFICANT CHANGE UP (ref 13–44)
LYMPHOCYTES # BLD AUTO: 6.6 % — LOW (ref 13–44)
MAGNESIUM SERPL-MCNC: 1.6 MG/DL — SIGNIFICANT CHANGE UP (ref 1.6–2.6)
MCHC RBC-ENTMCNC: 30.5 PG — SIGNIFICANT CHANGE UP (ref 27–34)
MCHC RBC-ENTMCNC: 30.8 PG — SIGNIFICANT CHANGE UP (ref 27–34)
MCHC RBC-ENTMCNC: 34.3 GM/DL — SIGNIFICANT CHANGE UP (ref 32–36)
MCHC RBC-ENTMCNC: 34.4 GM/DL — SIGNIFICANT CHANGE UP (ref 32–36)
MCV RBC AUTO: 89 FL — SIGNIFICANT CHANGE UP (ref 80–100)
MCV RBC AUTO: 89.5 FL — SIGNIFICANT CHANGE UP (ref 80–100)
MONOCYTES # BLD AUTO: 0.65 K/UL — SIGNIFICANT CHANGE UP (ref 0–0.9)
MONOCYTES # BLD AUTO: 0.76 K/UL — SIGNIFICANT CHANGE UP (ref 0–0.9)
MONOCYTES NFR BLD AUTO: 10.3 % — SIGNIFICANT CHANGE UP (ref 2–14)
MONOCYTES NFR BLD AUTO: 8.6 % — SIGNIFICANT CHANGE UP (ref 2–14)
NEUTROPHILS # BLD AUTO: 3.68 K/UL — SIGNIFICANT CHANGE UP (ref 1.8–7.4)
NEUTROPHILS # BLD AUTO: 7.43 K/UL — HIGH (ref 1.8–7.4)
NEUTROPHILS NFR BLD AUTO: 58.4 % — SIGNIFICANT CHANGE UP (ref 43–77)
NEUTROPHILS NFR BLD AUTO: 84.4 % — HIGH (ref 43–77)
PHOSPHATE SERPL-MCNC: 4.6 MG/DL — SIGNIFICANT CHANGE UP (ref 2.4–4.7)
PLATELET # BLD AUTO: 229 K/UL — SIGNIFICANT CHANGE UP (ref 150–400)
PLATELET # BLD AUTO: 269 K/UL — SIGNIFICANT CHANGE UP (ref 150–400)
POTASSIUM SERPL-MCNC: 4.3 MMOL/L — SIGNIFICANT CHANGE UP (ref 3.5–5.3)
POTASSIUM SERPL-SCNC: 4.3 MMOL/L — SIGNIFICANT CHANGE UP (ref 3.5–5.3)
PROT SERPL-MCNC: 4.9 G/DL — LOW (ref 6.6–8.7)
RBC # BLD: 3.05 M/UL — LOW (ref 3.8–5.2)
RBC # BLD: 3.47 M/UL — LOW (ref 3.8–5.2)
RBC # BLD: 3.47 M/UL — LOW (ref 3.8–5.2)
RBC # FLD: 20 % — HIGH (ref 10.3–14.5)
RBC # FLD: 20 % — HIGH (ref 10.3–14.5)
RETICS #: 125.3 K/UL — HIGH (ref 25–125)
RETICS/RBC NFR: 3.6 % — HIGH (ref 0.5–2.5)
SODIUM SERPL-SCNC: 142 MMOL/L — SIGNIFICANT CHANGE UP (ref 135–145)
WBC # BLD: 6.3 K/UL — SIGNIFICANT CHANGE UP (ref 3.8–10.5)
WBC # BLD: 8.81 K/UL — SIGNIFICANT CHANGE UP (ref 3.8–10.5)
WBC # FLD AUTO: 6.3 K/UL — SIGNIFICANT CHANGE UP (ref 3.8–10.5)
WBC # FLD AUTO: 8.81 K/UL — SIGNIFICANT CHANGE UP (ref 3.8–10.5)

## 2024-02-06 PROCEDURE — 58953 TAH RAD DISSECT FOR DEBULK: CPT | Mod: AS

## 2024-02-06 PROCEDURE — 44955 APPENDECTOMY ADD-ON: CPT

## 2024-02-06 PROCEDURE — 44143 PARTIAL REMOVAL OF COLON: CPT | Mod: AS

## 2024-02-06 PROCEDURE — 58953 TAH RAD DISSECT FOR DEBULK: CPT

## 2024-02-06 PROCEDURE — 44139 MOBILIZATION OF COLON: CPT | Mod: AS

## 2024-02-06 PROCEDURE — 44955 APPENDECTOMY ADD-ON: CPT | Mod: AS

## 2024-02-06 PROCEDURE — 96548 NTRAOP HIPEC PX EA ADD 30MIN: CPT

## 2024-02-06 PROCEDURE — 71045 X-RAY EXAM CHEST 1 VIEW: CPT | Mod: 26

## 2024-02-06 PROCEDURE — 44139 MOBILIZATION OF COLON: CPT

## 2024-02-06 PROCEDURE — 96547 INTRAOP HIPEC PX 1ST 60 MIN: CPT

## 2024-02-06 PROCEDURE — 44143 PARTIAL REMOVAL OF COLON: CPT

## 2024-02-06 DEVICE — CLIP APPLIER ETHICON LIGACLIP 11.5" MEDIUM
Type: IMPLANTABLE DEVICE | Status: NON-FUNCTIONAL
Removed: 2024-02-06

## 2024-02-06 DEVICE — STAPLER COVIDIEN TA 60 BLUE RELOAD
Type: IMPLANTABLE DEVICE | Status: NON-FUNCTIONAL
Removed: 2024-02-06

## 2024-02-06 DEVICE — STAPLER COVIDIEN TA 60 BLUE
Type: IMPLANTABLE DEVICE | Status: NON-FUNCTIONAL
Removed: 2024-02-06

## 2024-02-06 RX ORDER — METOPROLOL TARTRATE 50 MG
25 TABLET ORAL DAILY
Refills: 0 | Status: DISCONTINUED | OUTPATIENT
Start: 2024-02-07 | End: 2024-02-21

## 2024-02-06 RX ORDER — SODIUM CHLORIDE 9 MG/ML
1000 INJECTION, SOLUTION INTRAVENOUS
Refills: 0 | Status: DISCONTINUED | OUTPATIENT
Start: 2024-02-06 | End: 2024-02-07

## 2024-02-06 RX ORDER — METOCLOPRAMIDE HCL 10 MG
10 TABLET ORAL EVERY 6 HOURS
Refills: 0 | Status: DISCONTINUED | OUTPATIENT
Start: 2024-02-06 | End: 2024-02-21

## 2024-02-06 RX ORDER — CELECOXIB 200 MG/1
400 CAPSULE ORAL ONCE
Refills: 0 | Status: COMPLETED | OUTPATIENT
Start: 2024-02-06 | End: 2024-02-06

## 2024-02-06 RX ORDER — CISPLATIN 1 MG/ML
46 INJECTION, SOLUTION INTRAVENOUS ONCE
Refills: 0 | Status: DISCONTINUED | OUTPATIENT
Start: 2024-02-06 | End: 2024-02-06

## 2024-02-06 RX ORDER — CEFAZOLIN SODIUM 1 G
2000 VIAL (EA) INJECTION ONCE
Refills: 0 | Status: DISCONTINUED | OUTPATIENT
Start: 2024-02-06 | End: 2024-02-06

## 2024-02-06 RX ORDER — VALSARTAN 80 MG/1
40 TABLET ORAL DAILY
Refills: 0 | Status: DISCONTINUED | OUTPATIENT
Start: 2024-02-07 | End: 2024-02-09

## 2024-02-06 RX ORDER — FENTANYL CITRATE 50 UG/ML
50 INJECTION INTRAVENOUS
Refills: 0 | Status: DISCONTINUED | OUTPATIENT
Start: 2024-02-06 | End: 2024-02-06

## 2024-02-06 RX ORDER — ONDANSETRON 8 MG/1
4 TABLET, FILM COATED ORAL ONCE
Refills: 0 | Status: COMPLETED | OUTPATIENT
Start: 2024-02-06 | End: 2024-02-06

## 2024-02-06 RX ORDER — SODIUM CHLORIDE 9 MG/ML
3 INJECTION INTRAMUSCULAR; INTRAVENOUS; SUBCUTANEOUS EVERY 8 HOURS
Refills: 0 | Status: DISCONTINUED | OUTPATIENT
Start: 2024-02-06 | End: 2024-02-06

## 2024-02-06 RX ORDER — ACETAMINOPHEN 500 MG
975 TABLET ORAL ONCE
Refills: 0 | Status: COMPLETED | OUTPATIENT
Start: 2024-02-06 | End: 2024-02-06

## 2024-02-06 RX ORDER — SODIUM THIOSULFATE
17 CRYSTALS MISCELLANEOUS ONCE
Refills: 0 | Status: DISCONTINUED | OUTPATIENT
Start: 2024-02-06 | End: 2024-02-06

## 2024-02-06 RX ORDER — IBUPROFEN 200 MG
600 TABLET ORAL EVERY 6 HOURS
Refills: 0 | Status: DISCONTINUED | OUTPATIENT
Start: 2024-02-06 | End: 2024-02-07

## 2024-02-06 RX ORDER — SODIUM THIOSULFATE
22 CRYSTALS MISCELLANEOUS ONCE
Refills: 0 | Status: COMPLETED | OUTPATIENT
Start: 2024-02-06 | End: 2024-02-07

## 2024-02-06 RX ORDER — OXYCODONE HYDROCHLORIDE 5 MG/1
5 TABLET ORAL ONCE
Refills: 0 | Status: DISCONTINUED | OUTPATIENT
Start: 2024-02-06 | End: 2024-02-13

## 2024-02-06 RX ORDER — ATORVASTATIN CALCIUM 80 MG/1
10 TABLET, FILM COATED ORAL AT BEDTIME
Refills: 0 | Status: DISCONTINUED | OUTPATIENT
Start: 2024-02-06 | End: 2024-02-08

## 2024-02-06 RX ORDER — ONDANSETRON 8 MG/1
2 TABLET, FILM COATED ORAL EVERY 6 HOURS
Refills: 0 | Status: DISCONTINUED | OUTPATIENT
Start: 2024-02-06 | End: 2024-02-21

## 2024-02-06 RX ORDER — FENTANYL CITRATE 50 UG/ML
50 INJECTION INTRAVENOUS
Refills: 0 | Status: DISCONTINUED | OUTPATIENT
Start: 2024-02-06 | End: 2024-02-07

## 2024-02-06 RX ORDER — SODIUM THIOSULFATE
22 CRYSTALS MISCELLANEOUS ONCE
Refills: 0 | Status: DISCONTINUED | OUTPATIENT
Start: 2024-02-06 | End: 2024-02-06

## 2024-02-06 RX ORDER — ONDANSETRON 8 MG/1
4 TABLET, FILM COATED ORAL ONCE
Refills: 0 | Status: DISCONTINUED | OUTPATIENT
Start: 2024-02-06 | End: 2024-02-06

## 2024-02-06 RX ORDER — FENTANYL CITRATE 50 UG/ML
25 INJECTION INTRAVENOUS
Refills: 0 | Status: DISCONTINUED | OUTPATIENT
Start: 2024-02-06 | End: 2024-02-07

## 2024-02-06 RX ORDER — SODIUM CHLORIDE 9 MG/ML
1000 INJECTION, SOLUTION INTRAVENOUS
Refills: 0 | Status: DISCONTINUED | OUTPATIENT
Start: 2024-02-06 | End: 2024-02-06

## 2024-02-06 RX ORDER — SIMETHICONE 80 MG/1
80 TABLET, CHEWABLE ORAL EVERY 6 HOURS
Refills: 0 | Status: DISCONTINUED | OUTPATIENT
Start: 2024-02-06 | End: 2024-02-21

## 2024-02-06 RX ORDER — OXYCODONE HYDROCHLORIDE 5 MG/1
5 TABLET ORAL
Refills: 0 | Status: COMPLETED | OUTPATIENT
Start: 2024-02-06 | End: 2024-02-13

## 2024-02-06 RX ORDER — ACETAMINOPHEN 500 MG
975 TABLET ORAL
Refills: 0 | Status: DISCONTINUED | OUTPATIENT
Start: 2024-02-06 | End: 2024-02-07

## 2024-02-06 RX ADMIN — FENTANYL CITRATE 50 MICROGRAM(S): 50 INJECTION INTRAVENOUS at 22:45

## 2024-02-06 RX ADMIN — FENTANYL CITRATE 50 MICROGRAM(S): 50 INJECTION INTRAVENOUS at 21:20

## 2024-02-06 RX ADMIN — FENTANYL CITRATE 50 MICROGRAM(S): 50 INJECTION INTRAVENOUS at 22:34

## 2024-02-06 RX ADMIN — Medication 200 MILLIGRAM(S): at 11:07

## 2024-02-06 RX ADMIN — CELECOXIB 400 MILLIGRAM(S): 200 CAPSULE ORAL at 09:43

## 2024-02-06 RX ADMIN — FENTANYL CITRATE 50 MICROGRAM(S): 50 INJECTION INTRAVENOUS at 21:02

## 2024-02-06 RX ADMIN — FENTANYL CITRATE 50 MICROGRAM(S): 50 INJECTION INTRAVENOUS at 21:45

## 2024-02-06 RX ADMIN — Medication 975 MILLIGRAM(S): at 09:43

## 2024-02-06 RX ADMIN — ONDANSETRON 4 MILLIGRAM(S): 8 TABLET, FILM COATED ORAL at 21:05

## 2024-02-06 NOTE — BRIEF OPERATIVE NOTE - OPERATION/FINDINGS
irregularly shaped uterus with indistinguishable planes due to dense adhesions to bilateral fallopian tubes, ovaries, bladder, and rectosigmoid, requiring extensive dissection and creation of end colostomy at level of umbilicus, stoma pink, patent and pouch with condensation after case  Vaginal cuff and pedacles hemostatic.  Numerous <3mm implants along slmall small bowel and colon   milliary disease also along diaphragmatic peritoneum  Bladder cystotomy approx 2cm across dome of bladder. repaired which was confirmed with sterile milk instillation

## 2024-02-06 NOTE — BRIEF OPERATIVE NOTE - SPECIMENS
(1) uterus with cervix and bilateral fallopian tubes and ovaries, rectosigmoid, appendix; (2) falciform ligament (3) infra-gastric omentum; (4) lesser sac implants; (5) right abdominal peritoneum; (6) right diaphgragmatic peritoneum; (7) small bowel mesentary

## 2024-02-06 NOTE — BRIEF OPERATIVE NOTE - COMMENTS
Dictation by Dr Perez  A-line placed perioperatively for HIPEC  2U PRBC given intraoperatively  received sodium thiosulfate bolus for HIPEC  NGT placed intraoperatively in anticipation of colostomy

## 2024-02-06 NOTE — BRIEF OPERATIVE NOTE - NSICDXBRIEFPROCEDURE_GEN_ALL_CORE_FT
PROCEDURES:  AZALIA & BSO (total abdominal hysterectomy and bilateral salpingo-oophorectomy) 07-Feb-2024 08:59:21  Rogesr Kingston  Debulking, neoplasm, malignant, intra-abdominal, with HIPEC 07-Feb-2024 08:59:42 including diaphragrmatic peritoneum resection, small bowel and rectosigmoid implant excision, mesentary repair Rogers Kingston  Repair, cystotomy 07-Feb-2024 08:59:49  Rogers Kingston  Resection, rectosigmoid, with colostomy 07-Feb-2024 09:01:49  Rogers Kingston

## 2024-02-07 LAB
ACETONE SERPL-MCNC: NEGATIVE — SIGNIFICANT CHANGE UP
ALBUMIN SERPL ELPH-MCNC: 2.5 G/DL — LOW (ref 3.3–5.2)
ALBUMIN SERPL ELPH-MCNC: 2.8 G/DL — LOW (ref 3.3–5.2)
ALP SERPL-CCNC: 56 U/L — SIGNIFICANT CHANGE UP (ref 40–120)
ALP SERPL-CCNC: 65 U/L — SIGNIFICANT CHANGE UP (ref 40–120)
ALT FLD-CCNC: 59 U/L — HIGH
ALT FLD-CCNC: 81 U/L — HIGH
ANION GAP SERPL CALC-SCNC: 16 MMOL/L — SIGNIFICANT CHANGE UP (ref 5–17)
ANION GAP SERPL CALC-SCNC: 8 MMOL/L — SIGNIFICANT CHANGE UP (ref 5–17)
ANION GAP SERPL CALC-SCNC: 8 MMOL/L — SIGNIFICANT CHANGE UP (ref 5–17)
AST SERPL-CCNC: 52 U/L — HIGH
AST SERPL-CCNC: 75 U/L — HIGH
BASOPHILS # BLD AUTO: 0.02 K/UL — SIGNIFICANT CHANGE UP (ref 0–0.2)
BASOPHILS NFR BLD AUTO: 0.4 % — SIGNIFICANT CHANGE UP (ref 0–2)
BILIRUB SERPL-MCNC: 0.4 MG/DL — SIGNIFICANT CHANGE UP (ref 0.4–2)
BILIRUB SERPL-MCNC: 0.4 MG/DL — SIGNIFICANT CHANGE UP (ref 0.4–2)
BUN SERPL-MCNC: 12.9 MG/DL — SIGNIFICANT CHANGE UP (ref 8–20)
BUN SERPL-MCNC: 12.9 MG/DL — SIGNIFICANT CHANGE UP (ref 8–20)
BUN SERPL-MCNC: 13.4 MG/DL — SIGNIFICANT CHANGE UP (ref 8–20)
CALCIUM SERPL-MCNC: 7.9 MG/DL — LOW (ref 8.4–10.5)
CHLORIDE SERPL-SCNC: 107 MMOL/L — SIGNIFICANT CHANGE UP (ref 96–108)
CHLORIDE SERPL-SCNC: 107 MMOL/L — SIGNIFICANT CHANGE UP (ref 96–108)
CHLORIDE SERPL-SCNC: 108 MMOL/L — SIGNIFICANT CHANGE UP (ref 96–108)
CO2 SERPL-SCNC: 16 MMOL/L — LOW (ref 22–29)
CO2 SERPL-SCNC: 23 MMOL/L — SIGNIFICANT CHANGE UP (ref 22–29)
CO2 SERPL-SCNC: 23 MMOL/L — SIGNIFICANT CHANGE UP (ref 22–29)
CREAT SERPL-MCNC: 0.8 MG/DL — SIGNIFICANT CHANGE UP (ref 0.5–1.3)
CREAT SERPL-MCNC: 0.82 MG/DL — SIGNIFICANT CHANGE UP (ref 0.5–1.3)
CREAT SERPL-MCNC: 0.82 MG/DL — SIGNIFICANT CHANGE UP (ref 0.5–1.3)
EGFR: 84 ML/MIN/1.73M2 — SIGNIFICANT CHANGE UP
EGFR: 84 ML/MIN/1.73M2 — SIGNIFICANT CHANGE UP
EGFR: 86 ML/MIN/1.73M2 — SIGNIFICANT CHANGE UP
EOSINOPHIL # BLD AUTO: 0.15 K/UL — SIGNIFICANT CHANGE UP (ref 0–0.5)
EOSINOPHIL NFR BLD AUTO: 2.7 % — SIGNIFICANT CHANGE UP (ref 0–6)
GAS PNL BLDA: SIGNIFICANT CHANGE UP
GLUCOSE SERPL-MCNC: 113 MG/DL — HIGH (ref 70–99)
GLUCOSE SERPL-MCNC: 113 MG/DL — HIGH (ref 70–99)
GLUCOSE SERPL-MCNC: 173 MG/DL — HIGH (ref 70–99)
HCT VFR BLD CALC: 25.5 % — LOW (ref 34.5–45)
HCT VFR BLD CALC: 27.8 % — LOW (ref 34.5–45)
HGB BLD-MCNC: 8.4 G/DL — LOW (ref 11.5–15.5)
HGB BLD-MCNC: 9.7 G/DL — LOW (ref 11.5–15.5)
IMM GRANULOCYTES NFR BLD AUTO: 0.4 % — SIGNIFICANT CHANGE UP (ref 0–0.9)
LACTATE SERPL-SCNC: 1.5 MMOL/L — SIGNIFICANT CHANGE UP (ref 0.5–2)
LYMPHOCYTES # BLD AUTO: 1 K/UL — SIGNIFICANT CHANGE UP (ref 1–3.3)
LYMPHOCYTES # BLD AUTO: 18 % — SIGNIFICANT CHANGE UP (ref 13–44)
MAGNESIUM SERPL-MCNC: 1.5 MG/DL — LOW (ref 1.8–2.6)
MAGNESIUM SERPL-MCNC: 2 MG/DL — SIGNIFICANT CHANGE UP (ref 1.6–2.6)
MCHC RBC-ENTMCNC: 29.6 PG — SIGNIFICANT CHANGE UP (ref 27–34)
MCHC RBC-ENTMCNC: 30.8 PG — SIGNIFICANT CHANGE UP (ref 27–34)
MCHC RBC-ENTMCNC: 32.9 GM/DL — SIGNIFICANT CHANGE UP (ref 32–36)
MCHC RBC-ENTMCNC: 34.9 GM/DL — SIGNIFICANT CHANGE UP (ref 32–36)
MCV RBC AUTO: 88.3 FL — SIGNIFICANT CHANGE UP (ref 80–100)
MCV RBC AUTO: 89.8 FL — SIGNIFICANT CHANGE UP (ref 80–100)
MONOCYTES # BLD AUTO: 0.38 K/UL — SIGNIFICANT CHANGE UP (ref 0–0.9)
MONOCYTES NFR BLD AUTO: 6.8 % — SIGNIFICANT CHANGE UP (ref 2–14)
NEUTROPHILS # BLD AUTO: 3.99 K/UL — SIGNIFICANT CHANGE UP (ref 1.8–7.4)
NEUTROPHILS NFR BLD AUTO: 71.7 % — SIGNIFICANT CHANGE UP (ref 43–77)
PHOSPHATE SERPL-MCNC: 4.3 MG/DL — SIGNIFICANT CHANGE UP (ref 2.4–4.7)
PHOSPHATE SERPL-MCNC: 4.3 MG/DL — SIGNIFICANT CHANGE UP (ref 2.4–4.7)
PLATELET # BLD AUTO: 183 K/UL — SIGNIFICANT CHANGE UP (ref 150–400)
PLATELET # BLD AUTO: 204 K/UL — SIGNIFICANT CHANGE UP (ref 150–400)
POTASSIUM SERPL-MCNC: 3.8 MMOL/L — SIGNIFICANT CHANGE UP (ref 3.5–5.3)
POTASSIUM SERPL-MCNC: 3.8 MMOL/L — SIGNIFICANT CHANGE UP (ref 3.5–5.3)
POTASSIUM SERPL-MCNC: 4.1 MMOL/L — SIGNIFICANT CHANGE UP (ref 3.5–5.3)
POTASSIUM SERPL-SCNC: 3.8 MMOL/L — SIGNIFICANT CHANGE UP (ref 3.5–5.3)
POTASSIUM SERPL-SCNC: 3.8 MMOL/L — SIGNIFICANT CHANGE UP (ref 3.5–5.3)
POTASSIUM SERPL-SCNC: 4.1 MMOL/L — SIGNIFICANT CHANGE UP (ref 3.5–5.3)
PROT SERPL-MCNC: 4.8 G/DL — LOW (ref 6.6–8.7)
PROT SERPL-MCNC: 5.3 G/DL — LOW (ref 6.6–8.7)
RBC # BLD: 2.84 M/UL — LOW (ref 3.8–5.2)
RBC # BLD: 3.15 M/UL — LOW (ref 3.8–5.2)
RBC # FLD: 20.6 % — HIGH (ref 10.3–14.5)
RBC # FLD: 20.7 % — HIGH (ref 10.3–14.5)
SODIUM SERPL-SCNC: 138 MMOL/L — SIGNIFICANT CHANGE UP (ref 135–145)
SODIUM SERPL-SCNC: 138 MMOL/L — SIGNIFICANT CHANGE UP (ref 135–145)
SODIUM SERPL-SCNC: 140 MMOL/L — SIGNIFICANT CHANGE UP (ref 135–145)
WBC # BLD: 5.56 K/UL — SIGNIFICANT CHANGE UP (ref 3.8–10.5)
WBC # BLD: 9.93 K/UL — SIGNIFICANT CHANGE UP (ref 3.8–10.5)
WBC # FLD AUTO: 5.56 K/UL — SIGNIFICANT CHANGE UP (ref 3.8–10.5)
WBC # FLD AUTO: 9.93 K/UL — SIGNIFICANT CHANGE UP (ref 3.8–10.5)

## 2024-02-07 PROCEDURE — 99222 1ST HOSP IP/OBS MODERATE 55: CPT

## 2024-02-07 RX ORDER — ACETAMINOPHEN 500 MG
1000 TABLET ORAL ONCE
Refills: 0 | Status: COMPLETED | OUTPATIENT
Start: 2024-02-07 | End: 2024-02-07

## 2024-02-07 RX ORDER — INFLUENZA VIRUS VACCINE 15; 15; 15; 15 UG/.5ML; UG/.5ML; UG/.5ML; UG/.5ML
0.5 SUSPENSION INTRAMUSCULAR ONCE
Refills: 0 | Status: DISCONTINUED | OUTPATIENT
Start: 2024-02-07 | End: 2024-02-21

## 2024-02-07 RX ORDER — KETOROLAC TROMETHAMINE 30 MG/ML
15 SYRINGE (ML) INJECTION EVERY 6 HOURS
Refills: 0 | Status: DISCONTINUED | OUTPATIENT
Start: 2024-02-07 | End: 2024-02-07

## 2024-02-07 RX ORDER — SODIUM CHLORIDE 9 MG/ML
1000 INJECTION, SOLUTION INTRAVENOUS
Refills: 0 | Status: DISCONTINUED | OUTPATIENT
Start: 2024-02-07 | End: 2024-02-13

## 2024-02-07 RX ORDER — SODIUM CHLORIDE 9 MG/ML
500 INJECTION, SOLUTION INTRAVENOUS ONCE
Refills: 0 | Status: COMPLETED | OUTPATIENT
Start: 2024-02-07 | End: 2024-02-07

## 2024-02-07 RX ORDER — HYDROMORPHONE HYDROCHLORIDE 2 MG/ML
0.5 INJECTION INTRAMUSCULAR; INTRAVENOUS; SUBCUTANEOUS EVERY 4 HOURS
Refills: 0 | Status: DISCONTINUED | OUTPATIENT
Start: 2024-02-07 | End: 2024-02-07

## 2024-02-07 RX ORDER — WATER FOR INHALATION
1000 VIAL, NEBULIZER (ML) INHALATION
Refills: 0 | Status: DISCONTINUED | OUTPATIENT
Start: 2024-02-07 | End: 2024-02-07

## 2024-02-07 RX ORDER — MORPHINE SULFATE 50 MG/1
2 CAPSULE, EXTENDED RELEASE ORAL EVERY 4 HOURS
Refills: 0 | Status: DISCONTINUED | OUTPATIENT
Start: 2024-02-07 | End: 2024-02-07

## 2024-02-07 RX ORDER — MAGNESIUM SULFATE 500 MG/ML
2 VIAL (ML) INJECTION ONCE
Refills: 0 | Status: COMPLETED | OUTPATIENT
Start: 2024-02-07 | End: 2024-02-07

## 2024-02-07 RX ORDER — OXYCODONE HYDROCHLORIDE 5 MG/1
5 TABLET ORAL
Refills: 0 | Status: DISCONTINUED | OUTPATIENT
Start: 2024-02-07 | End: 2024-02-07

## 2024-02-07 RX ORDER — KETOROLAC TROMETHAMINE 30 MG/ML
15 SYRINGE (ML) INJECTION EVERY 6 HOURS
Refills: 0 | Status: DISCONTINUED | OUTPATIENT
Start: 2024-02-07 | End: 2024-02-12

## 2024-02-07 RX ADMIN — Medication 10 MILLIGRAM(S): at 00:30

## 2024-02-07 RX ADMIN — Medication 400 MILLIGRAM(S): at 16:33

## 2024-02-07 RX ADMIN — FENTANYL CITRATE 50 MICROGRAM(S): 50 INJECTION INTRAVENOUS at 00:19

## 2024-02-07 RX ADMIN — HYDROMORPHONE HYDROCHLORIDE 0.5 MILLIGRAM(S): 2 INJECTION INTRAMUSCULAR; INTRAVENOUS; SUBCUTANEOUS at 06:00

## 2024-02-07 RX ADMIN — Medication 83 MILLILITER(S): at 04:12

## 2024-02-07 RX ADMIN — FENTANYL CITRATE 50 MICROGRAM(S): 50 INJECTION INTRAVENOUS at 01:00

## 2024-02-07 RX ADMIN — HYDROMORPHONE HYDROCHLORIDE 0.5 MILLIGRAM(S): 2 INJECTION INTRAMUSCULAR; INTRAVENOUS; SUBCUTANEOUS at 05:24

## 2024-02-07 RX ADMIN — Medication 25 GRAM(S): at 02:37

## 2024-02-07 RX ADMIN — Medication 400 MILLIGRAM(S): at 21:59

## 2024-02-07 RX ADMIN — ONDANSETRON 2 MILLIGRAM(S): 8 TABLET, FILM COATED ORAL at 22:19

## 2024-02-07 RX ADMIN — Medication 15 MILLIGRAM(S): at 18:27

## 2024-02-07 RX ADMIN — Medication 1000 MILLIGRAM(S): at 02:00

## 2024-02-07 RX ADMIN — FENTANYL CITRATE 25 MICROGRAM(S): 50 INJECTION INTRAVENOUS at 07:24

## 2024-02-07 RX ADMIN — HYDROMORPHONE HYDROCHLORIDE 0.5 MILLIGRAM(S): 2 INJECTION INTRAMUSCULAR; INTRAVENOUS; SUBCUTANEOUS at 09:24

## 2024-02-07 RX ADMIN — HYDROMORPHONE HYDROCHLORIDE 0.5 MILLIGRAM(S): 2 INJECTION INTRAMUSCULAR; INTRAVENOUS; SUBCUTANEOUS at 10:05

## 2024-02-07 RX ADMIN — SODIUM CHLORIDE 75 MILLILITER(S): 9 INJECTION, SOLUTION INTRAVENOUS at 21:59

## 2024-02-07 RX ADMIN — Medication 400 MILLIGRAM(S): at 01:30

## 2024-02-07 RX ADMIN — SODIUM CHLORIDE 1000 MILLILITER(S): 9 INJECTION, SOLUTION INTRAVENOUS at 02:56

## 2024-02-07 RX ADMIN — FENTANYL CITRATE 25 MICROGRAM(S): 50 INJECTION INTRAVENOUS at 07:19

## 2024-02-07 RX ADMIN — Medication 166.67 GRAM(S): at 00:00

## 2024-02-07 NOTE — CONSULT NOTE ADULT - SUBJECTIVE AND OBJECTIVE BOX
This is a 58F that has been currently receiving chemotherapy through right chest wall port for recently diagnosed ovarian cancer. During recent hospitalization for SOB, workup revealed peritoneal carcinomatosis with ascites as well as right sided pleural effusion. Today she was taken for AZALIA, BSO, Morfin's, with cytoreductive surgery and HIPEC. Post-op labs show a metabolic acidosis with lactate 3.6 and high chloride, and pH of 7.27. GYN Onc resident called for evaluation for stepdown admission. Patient interviewed with the aid video . Patient is in PACU resting comfortably with normal vital signs, she states no nausea, no CP, no SOB, c/o some lower abdominal pain.     PAST MEDICAL & SURGICAL HISTORY:  ovarian CA  HTN (hypertension)  High cholesterol  H/O pleural effusion  History of thoracentesis    Home Medications:  ASA 81mg:  (06 Feb 2024 09:29)  furosemide 40 mg oral tablet: 1 tab(s) orally (06 Feb 2024 09:29)  Lipitor 80 mg oral tablet: 1 tab(s) orally (06 Feb 2024 09:29)  metoprolol succinate 50 mg oral tablet, extended release: 1 tab(s) orally (06 Feb 2024 09:29)  valsartan 40 mg oral tablet: 1 tab(s) orally once a day (06 Feb 2024 09:29)    MEDICATIONS  (STANDING):  acetaminophen     Tablet .. 975 milliGRAM(s) Oral <User Schedule>  atorvastatin 10 milliGRAM(s) Oral at bedtime  ibuprofen  Tablet. 600 milliGRAM(s) Oral every 6 hours  lactated ringers. 1000 milliLiter(s) (75 mL/Hr) IV Continuous <Continuous>  lactated ringers. 1000 milliLiter(s) (125 mL/Hr) IV Continuous <Continuous>  magnesium sulfate  IVPB 2 Gram(s) IV Intermittent once  metoprolol succinate ER 25 milliGRAM(s) Oral daily  metroNIDAZOLE  IVPB 500 milliGRAM(s) IV Intermittent once  valsartan 40 milliGRAM(s) Oral daily    MEDICATIONS  (PRN):  acetaminophen   IVPB .. 1000 milliGRAM(s) IV Intermittent once PRN Mild Pain (1 - 3)  fentaNYL    Injectable 25 MICROGram(s) IV Push every 5 minutes PRN Moderate Pain (4 - 6)  HYDROmorphone  Injectable 0.5 milliGRAM(s) IV Push every 4 hours PRN Severe Pain (7 - 10)  ketorolac   Injectable 15 milliGRAM(s) IV Push every 6 hours PRN Moderate Pain (4 - 6)  metoclopramide Injectable 10 milliGRAM(s) IV Push every 6 hours PRN Nausea and/or Vomiting  ondansetron Injectable 2 milliGRAM(s) IV Push every 6 hours PRN Nausea and/or Vomiting  oxyCODONE    IR 5 milliGRAM(s) Oral once PRN Moderate to Severe Pain (4-10)  oxyCODONE    IR 5 milliGRAM(s) Oral every 3 hours PRN Moderate to Severe Pain (4-10)  simethicone 80 milliGRAM(s) Chew every 6 hours PRN Gas    Vital Signs Last 24 Hrs  T(C): 36.8 (06 Feb 2024 23:15), Max: 36.8 (06 Feb 2024 09:19)  T(F): 98.2 (06 Feb 2024 23:15), Max: 98.2 (06 Feb 2024 09:19)  HR: 69 (07 Feb 2024 00:00) (69 - 86)  BP: 126/68 (07 Feb 2024 00:00) (106/49 - 130/68)  BP(mean): 82 (07 Feb 2024 00:00) (63 - 84)  RR: 14 (07 Feb 2024 00:00) (13 - 20)  SpO2: 100% (07 Feb 2024 00:00) (98% - 100%)    Parameters below as of 07 Feb 2024 00:00  Patient On (Oxygen Delivery Method): nasal cannula  O2 Flow (L/min): 2    PHYSICAL EXAM:      Constitutional: Alert, NAD    Eyes: EOMI, non-icteric    Respiratory: CTA b/l    Cardiovascular: s1s2 sinuis on monitor    Gastrointestinal: midline inscision covered with pervena, ostomy dusky, non productive    Genitourinary: desai in place, straw colored urine    Extremities: trace edema    Vascular: distal pulses palpable     Neurological: alert orinted x3, moves all extremities     Skin: warm dry    Psychiatric: normal affect    CBC Full  -  ( 06 Feb 2024 21:25 )  WBC Count : 8.81 K/uL  RBC Count : 3.05 M/uL  Hemoglobin : 9.4 g/dL  Hematocrit : 27.3 %  Platelet Count - Automated : 229 K/uL  Mean Cell Volume : 89.5 fl  Mean Cell Hemoglobin : 30.8 pg  Mean Cell Hemoglobin Concentration : 34.4 gm/dL  Auto Neutrophil # : 7.43 K/uL  Auto Lymphocyte # : 0.58 K/uL  Auto Monocyte # : 0.76 K/uL  Auto Eosinophil # : 0.00 K/uL  Auto Basophil # : 0.02 K/uL  Auto Neutrophil % : 84.4 %  Auto Lymphocyte % : 6.6 %  Auto Monocyte % : 8.6 %  Auto Eosinophil % : 0.0 %  Auto Basophil % : 0.2 %    02-06    142  |  111<H>  |  15.2  ----------------------------<  171<H>  4.3   |  16.0<L>  |  1.04    Ca    7.8<L>      06 Feb 2024 21:25  Phos  4.6     02-06  Mg     1.6     02-06    TPro  4.9<L>  /  Alb  2.6<L>  /  TBili  0.5  /  DBili  x   /  AST  84<H>  /  ALT  86<H>  /  AlkPhos  62  02-06    Lactate: 3.6    ABG - ( 06 Feb 2024 18:42 )  pH, Arterial: 7.240 pH, Blood: x     /  pCO2: 35    /  pO2: 259   / HCO3: 15    / Base Excess: -12.4 /  SaO2: 100.0                  This is a 58F that has been currently receiving chemotherapy through right chest wall port for recently diagnosed ovarian cancer. During recent hospitalization for SOB, workup revealed peritoneal carcinomatosis with ascites as well as right sided pleural effusion. Today she was taken for AZALIA, BSO, Morfin's, with cytoreductive surgery and HIPEC. Post-op labs show a metabolic acidosis with lactate 3.6 and high chloride, and pH of 7.27. GYN Onc resident called for evaluation for stepdown admission. Patient interviewed with the aid of a video . Patient is in PACU resting comfortably with normal vital signs, she states no nausea, no CP, no SOB, c/o some lower abdominal pain.     OR course 10.5 H, 600 ebl, 350 UOP, 6L crystaloid, 2 U prbc    PAST MEDICAL & SURGICAL HISTORY:  ovarian CA  HTN (hypertension)  High cholesterol  H/O pleural effusion  History of thoracentesis    Home Medications:  ASA 81mg:  (06 Feb 2024 09:29)  furosemide 40 mg oral tablet: 1 tab(s) orally (06 Feb 2024 09:29)  Lipitor 80 mg oral tablet: 1 tab(s) orally (06 Feb 2024 09:29)  metoprolol succinate 50 mg oral tablet, extended release: 1 tab(s) orally (06 Feb 2024 09:29)  valsartan 40 mg oral tablet: 1 tab(s) orally once a day (06 Feb 2024 09:29)    MEDICATIONS  (STANDING):  acetaminophen     Tablet .. 975 milliGRAM(s) Oral <User Schedule>  atorvastatin 10 milliGRAM(s) Oral at bedtime  ibuprofen  Tablet. 600 milliGRAM(s) Oral every 6 hours  lactated ringers. 1000 milliLiter(s) (75 mL/Hr) IV Continuous <Continuous>  lactated ringers. 1000 milliLiter(s) (125 mL/Hr) IV Continuous <Continuous>  magnesium sulfate  IVPB 2 Gram(s) IV Intermittent once  metoprolol succinate ER 25 milliGRAM(s) Oral daily  metroNIDAZOLE  IVPB 500 milliGRAM(s) IV Intermittent once  valsartan 40 milliGRAM(s) Oral daily    MEDICATIONS  (PRN):  acetaminophen   IVPB .. 1000 milliGRAM(s) IV Intermittent once PRN Mild Pain (1 - 3)  fentaNYL    Injectable 25 MICROGram(s) IV Push every 5 minutes PRN Moderate Pain (4 - 6)  HYDROmorphone  Injectable 0.5 milliGRAM(s) IV Push every 4 hours PRN Severe Pain (7 - 10)  ketorolac   Injectable 15 milliGRAM(s) IV Push every 6 hours PRN Moderate Pain (4 - 6)  metoclopramide Injectable 10 milliGRAM(s) IV Push every 6 hours PRN Nausea and/or Vomiting  ondansetron Injectable 2 milliGRAM(s) IV Push every 6 hours PRN Nausea and/or Vomiting  oxyCODONE    IR 5 milliGRAM(s) Oral once PRN Moderate to Severe Pain (4-10)  oxyCODONE    IR 5 milliGRAM(s) Oral every 3 hours PRN Moderate to Severe Pain (4-10)  simethicone 80 milliGRAM(s) Chew every 6 hours PRN Gas    Vital Signs Last 24 Hrs  T(C): 36.8 (06 Feb 2024 23:15), Max: 36.8 (06 Feb 2024 09:19)  T(F): 98.2 (06 Feb 2024 23:15), Max: 98.2 (06 Feb 2024 09:19)  HR: 69 (07 Feb 2024 00:00) (69 - 86)  BP: 126/68 (07 Feb 2024 00:00) (106/49 - 130/68)  BP(mean): 82 (07 Feb 2024 00:00) (63 - 84)  RR: 14 (07 Feb 2024 00:00) (13 - 20)  SpO2: 100% (07 Feb 2024 00:00) (98% - 100%)    Parameters below as of 07 Feb 2024 00:00  Patient On (Oxygen Delivery Method): nasal cannula  O2 Flow (L/min): 2    PHYSICAL EXAM:      Constitutional: Alert, NAD    Eyes: EOMI, non-icteric    Respiratory: CTA b/l    Cardiovascular: s1s2 sinus on monitor    Gastrointestinal: midline incision covered with pervena, ostomy dusky, non productive    Genitourinary: desai in place, straw colored urine    Extremities: trace edema    Vascular: distal pulses palpable     Neurological: alert oriented x3, moves all extremities     Skin: warm dry    Psychiatric: normal affect    CBC Full  -  ( 06 Feb 2024 21:25 )  WBC Count : 8.81 K/uL  RBC Count : 3.05 M/uL  Hemoglobin : 9.4 g/dL  Hematocrit : 27.3 %  Platelet Count - Automated : 229 K/uL  Mean Cell Volume : 89.5 fl  Mean Cell Hemoglobin : 30.8 pg  Mean Cell Hemoglobin Concentration : 34.4 gm/dL  Auto Neutrophil # : 7.43 K/uL  Auto Lymphocyte # : 0.58 K/uL  Auto Monocyte # : 0.76 K/uL  Auto Eosinophil # : 0.00 K/uL  Auto Basophil # : 0.02 K/uL  Auto Neutrophil % : 84.4 %  Auto Lymphocyte % : 6.6 %  Auto Monocyte % : 8.6 %  Auto Eosinophil % : 0.0 %  Auto Basophil % : 0.2 %    02-06    142  |  111<H>  |  15.2  ----------------------------<  171<H>  4.3   |  16.0<L>  |  1.04    Ca    7.8<L>      06 Feb 2024 21:25  Phos  4.6     02-06  Mg     1.6     02-06    TPro  4.9<L>  /  Alb  2.6<L>  /  TBili  0.5  /  DBili  x   /  AST  84<H>  /  ALT  86<H>  /  AlkPhos  62  02-06    Lactate: 3.6    ABG - ( 06 Feb 2024 18:42 )  pH, Arterial: 7.240 pH, Blood: x     /  pCO2: 35    /  pO2: 259   / HCO3: 15    / Base Excess: -12.4 /  SaO2: 100.0                  This is a 58F that has been currently receiving chemotherapy through right chest wall port for recently diagnosed ovarian cancer. During recent hospitalization for SOB, workup revealed peritoneal carcinomatosis with ascites as well as right sided pleural effusion. Today she was taken for AZALIA, BSO, Morfin's, with cytoreductive surgery and HIPEC. Post-op labs show a metabolic acidosis with lactate 3.6 and high chloride, and pH of 7.27. GYN Onc resident called for evaluation for stepdown admission. Patient interviewed with the aid of a video . Patient is in PACU resting comfortably with normal vital signs, she states no nausea, no CP, no SOB, c/o some lower abdominal pain.     d/w gyn on resident, OR course 10.5 H, 600 ebl, 350 UOP, 6L crystaloid, 2 U prbc    PAST MEDICAL & SURGICAL HISTORY:  ovarian CA  HTN (hypertension)  High cholesterol  H/O pleural effusion  History of thoracentesis    Home Medications:  ASA 81mg:  (06 Feb 2024 09:29)  furosemide 40 mg oral tablet: 1 tab(s) orally (06 Feb 2024 09:29)  Lipitor 80 mg oral tablet: 1 tab(s) orally (06 Feb 2024 09:29)  metoprolol succinate 50 mg oral tablet, extended release: 1 tab(s) orally (06 Feb 2024 09:29)  valsartan 40 mg oral tablet: 1 tab(s) orally once a day (06 Feb 2024 09:29)    MEDICATIONS  (STANDING):  acetaminophen     Tablet .. 975 milliGRAM(s) Oral <User Schedule>  atorvastatin 10 milliGRAM(s) Oral at bedtime  ibuprofen  Tablet. 600 milliGRAM(s) Oral every 6 hours  lactated ringers. 1000 milliLiter(s) (75 mL/Hr) IV Continuous <Continuous>  lactated ringers. 1000 milliLiter(s) (125 mL/Hr) IV Continuous <Continuous>  magnesium sulfate  IVPB 2 Gram(s) IV Intermittent once  metoprolol succinate ER 25 milliGRAM(s) Oral daily  metroNIDAZOLE  IVPB 500 milliGRAM(s) IV Intermittent once  valsartan 40 milliGRAM(s) Oral daily    MEDICATIONS  (PRN):  acetaminophen   IVPB .. 1000 milliGRAM(s) IV Intermittent once PRN Mild Pain (1 - 3)  fentaNYL    Injectable 25 MICROGram(s) IV Push every 5 minutes PRN Moderate Pain (4 - 6)  HYDROmorphone  Injectable 0.5 milliGRAM(s) IV Push every 4 hours PRN Severe Pain (7 - 10)  ketorolac   Injectable 15 milliGRAM(s) IV Push every 6 hours PRN Moderate Pain (4 - 6)  metoclopramide Injectable 10 milliGRAM(s) IV Push every 6 hours PRN Nausea and/or Vomiting  ondansetron Injectable 2 milliGRAM(s) IV Push every 6 hours PRN Nausea and/or Vomiting  oxyCODONE    IR 5 milliGRAM(s) Oral once PRN Moderate to Severe Pain (4-10)  oxyCODONE    IR 5 milliGRAM(s) Oral every 3 hours PRN Moderate to Severe Pain (4-10)  simethicone 80 milliGRAM(s) Chew every 6 hours PRN Gas    Vital Signs Last 24 Hrs  T(C): 36.8 (06 Feb 2024 23:15), Max: 36.8 (06 Feb 2024 09:19)  T(F): 98.2 (06 Feb 2024 23:15), Max: 98.2 (06 Feb 2024 09:19)  HR: 69 (07 Feb 2024 00:00) (69 - 86)  BP: 126/68 (07 Feb 2024 00:00) (106/49 - 130/68)  BP(mean): 82 (07 Feb 2024 00:00) (63 - 84)  RR: 14 (07 Feb 2024 00:00) (13 - 20)  SpO2: 100% (07 Feb 2024 00:00) (98% - 100%)    Parameters below as of 07 Feb 2024 00:00  Patient On (Oxygen Delivery Method): nasal cannula  O2 Flow (L/min): 2    PHYSICAL EXAM:      Constitutional: Alert, NAD    Eyes: EOMI, non-icteric    Respiratory: CTA b/l    Cardiovascular: s1s2 sinus on monitor    Gastrointestinal: midline incision covered with pervena, ostomy dusky, non productive    Genitourinary: desai in place, straw colored urine    Extremities: trace edema    Vascular: distal pulses palpable     Neurological: alert oriented x3, moves all extremities     Skin: warm dry    Psychiatric: normal affect    CBC Full  -  ( 06 Feb 2024 21:25 )  WBC Count : 8.81 K/uL  RBC Count : 3.05 M/uL  Hemoglobin : 9.4 g/dL  Hematocrit : 27.3 %  Platelet Count - Automated : 229 K/uL  Mean Cell Volume : 89.5 fl  Mean Cell Hemoglobin : 30.8 pg  Mean Cell Hemoglobin Concentration : 34.4 gm/dL  Auto Neutrophil # : 7.43 K/uL  Auto Lymphocyte # : 0.58 K/uL  Auto Monocyte # : 0.76 K/uL  Auto Eosinophil # : 0.00 K/uL  Auto Basophil # : 0.02 K/uL  Auto Neutrophil % : 84.4 %  Auto Lymphocyte % : 6.6 %  Auto Monocyte % : 8.6 %  Auto Eosinophil % : 0.0 %  Auto Basophil % : 0.2 %    02-06    142  |  111<H>  |  15.2  ----------------------------<  171<H>  4.3   |  16.0<L>  |  1.04    Ca    7.8<L>      06 Feb 2024 21:25  Phos  4.6     02-06  Mg     1.6     02-06    TPro  4.9<L>  /  Alb  2.6<L>  /  TBili  0.5  /  DBili  x   /  AST  84<H>  /  ALT  86<H>  /  AlkPhos  62  02-06    Lactate: 3.6    ABG - ( 06 Feb 2024 18:42 )  pH, Arterial: 7.240 pH, Blood: x     /  pCO2: 35    /  pO2: 259   / HCO3: 15    / Base Excess: -12.4 /  SaO2: 100.0

## 2024-02-07 NOTE — PATIENT PROFILE ADULT - FUNCTIONAL ASSESSMENT - DAILY ACTIVITY SCORE.
Patient:   Himanshu Solis            MRN: [id number]            FIN: [id number]              Age:   82 years     Sex:  MALE     :  [birth year]   Associated Diagnoses:   None   Author:   Trevon Judge     Subjective   Patient seen today. Normal stress test lateral wall reversible defect. Currently on amlodipine and beta-blocker therapy also. History of hypertension dyslipidemia. Patient had right coronary stenting was done few weeks ago was returning back for right coronary intervention. I spoke to the patient already. All the risks and benefits and pros and cons and indications were explained in detail. Mortality morbidity limb loss renal failure  dialysis neurological event stroke death perforation rupture thrombosis groin problems radial artery problems vascular problems emergency cardiac noncardiac surgeries infection renal failure dialysis neurological events disability medical therapy conservative therapy other options of treatment and various other complications and we had many other miscellaneous complications already known and reiterated he understands appreciates and wishes to  proceed. Will approach via radial route if unable to do so groin approach will be done. ACC AHA appropriately additional criteria #1 angina #2 abnormal stress test #3 beta-blocker therapy #4 calcium channel blocker therapy #5 hypertension #6 dyslipidemia #8 known coronary disease #9 staged PCI     Health Status   Allergies:     Allergies (1) Active Reaction  NKA None Documented    Current medications: Medications (1) Active  Scheduled: (0)  Continuous: (1)  Sodium Chloride 0.9% 1000 mL [1 mL/kg/hr]  1,000 mL, IV  PRN: (0)        Objective   Intake and Output   I & O between:  2020 09:00 TO 2020 09:00  Med Dosing Weight:  54.6  kg   2020  24 Hour Intake:   180.00  ( 3.30 mL/kg )  24 Hour Output:   0.00           24 Hour Urine/Stool Output:   0.0  24 Hour Balance:   180.00           24 Hour Urine Output:   0.00  ( 0.00 mL/kg/hr )     VS/Measurements   Vitals between:   06-FEB-2020 09:00:50   TO   07-FEB-2020 09:00:50                   LAST RESULT MINIMUM MAXIMUM  Temperature 37.0 37.0 37.0  Heart Rate 55 55 55  Respiratory Rate 14 14 14  NISBP           150 150 150  NIDBP           68 68 68  SpO2                    100 100 100      Lines and Tubes:    LINES  Peripheral Intravenous Forearm Left   Gauge: 20   Charted: 02/07/20 07:58  Inserted: 02/07/20   Days Since Insertion: 0 days  Indication of Use: Hydration, Saline Lock   . General:  Alert and oriented, No acute distress. Eye:  Pupils are equal, round and reactive to light. Neck:  No carotid bruit, No jugular venous distention. Respiratory:  Lungs are clear to auscultation, Respirations are non-labored, Breath sounds are equal.   Cardiovascular:  Normal rate, Regular rhythm, Good pulses equal in all extremities. Gastrointestinal:  Soft, Non-tender, Normal bowel sounds. Integumentary:  Warm, Dry. Neurologic:  Alert, Oriented, Normal sensory, Normal motor function. Results Review   General results   Interpretation:   No Qualifying Labs are resulted on this patient in the last 24 hours                   Cardiovascular Labs   No cardiovascular lab results found over the previous 48 hours. 17

## 2024-02-07 NOTE — PROGRESS NOTE ADULT - SUBJECTIVE AND OBJECTIVE BOX
PENELOPE FLAHERTY is a 58y now POD#1 s/p ex-lap AZALIA BSO, cytoreductive surgery HIPEC, ostomy creation, c/b bladder perforation, repaired intraop. Received 2u pRBC intraop.    S:    Patient was seen and examined at bedside in PACU for POC.  Patient states her throat is hurting her 2/2 NGT, otherwise no complaints.   Pain is well controlled with current treatment regimen.   NPO, denies N/V.   Not yet ambulating  Doe in place  She denies lightheadedness, dizziness, palpitations, chest pain and SOB.     O:   T(C): 36.7 (02-07-24 @ 04:00), Max: 36.8 (02-06-24 @ 09:19)  HR: 76 (02-07-24 @ 04:00) (69 - 86)  BP: 143/71 (02-07-24 @ 04:00) (106/49 - 143/71)  RR: 17 (02-07-24 @ 04:00) (13 - 20)  SpO2: 99% (02-07-24 @ 04:00) (98% - 100%)    Gen: NAD, AAOx3  HEENT: NGT in place  Resp: breathing comfortably on RA  Abdomen: Soft, nondistended, appropriately tender  GI: ostomy dusky, non productive   Pelvic: no active vaginal bleeding   Incision: prevena in place, to suction  Extremities: No calf tenderness or edema     Labs:                         9.7    9.93  )-----------( 183      ( 07 Feb 2024 02:15 )             27.8     02-07    140  |  108  |  13.4  ----------------------------<  173<H>  4.1   |  16.0<L>  |  0.80    Ca    7.9<L>      07 Feb 2024 02:15  Phos  4.3     02-07  Mg     1.5     02-07    TPro  5.3<L>  /  Alb  2.8<L>  /  TBili  0.4  /  DBili  x   /  AST  75<H>  /  ALT  81<H>  /  AlkPhos  65  02-07    Blood Gas Arterial, Lactate (02.07.24 @ 02:01)    Blood Gas Arterial, Lactate: 2.20: Elevated lactate. Consider ordering follow-up lactate to trend. mmol/L        02-06-24 @ 07:01  -  02-07-24 @ 05:21  --------------------------------------------------------  IN: 50 mL / OUT: 775 mL / NET: -725 mL

## 2024-02-07 NOTE — PATIENT PROFILE ADULT - DO YOU EVER NEED HELP READING HOSPITAL MATERIALS?
How Severe Are Your Spot(S)?: mild
Have Your Spot(S) Been Treated In The Past?: has not been treated
no
Hpi Title: Evaluation of Skin Lesions
Family Member: Mother
Location: Upper mid back
Year Removed: 2016

## 2024-02-07 NOTE — PROGRESS NOTE ADULT - ASSESSMENT
A/P: PENELOPE FLAHERTY is a 58y now POD#1 s/p ex-lap AZALIA BSO, cytoreductive surgery, HIPEC, ostomy creation, c/b bladder perforation, repaired intraop. Received 2u pRBC intraop.    Neuro: Continue current pain regimen.  CV: Blood pressure well controlled.  Pulm: Saturating well on room air. Incentive spirometer use encouraged  GI: ostomy dusky, non productive. NGT in place w dark output. NPO  : Doe in place, s/p bladder repair intraop  Heme: Hgb 9.7 -> AM labs pending  ID: Afebrile. No antibiotics indicated at this time.   FEN: IVF per SICU, managing metabolic acidosis. Repleted Mg. AM labs pending.   Skin: No active disease.   Psych: No active disease.   DVT ppx: Ambulation encouraged, SCDs when in bed, heparin to be ordered this AM pending labs.  Dispo: Pending labs and AM rounds. Appreciate SICU input.

## 2024-02-07 NOTE — CONSULT NOTE ADULT - CONSULT REASON
Metabolic Acidosis, s/p AZALIA, BSO, Hartmans, with HIPEC Metabolic Acidosis, s/p AZALIA, BSO, Morfin's, with HIPEC

## 2024-02-07 NOTE — CONSULT NOTE ADULT - ASSESSMENT
58F now s/p AZALIA, BSO, Morfin's, bladder repair with cytoreductive surgery and HIPEC, metabolic acidosis with elevated lactate    - admit to stepdown  - Stat labs now  - on IVF, continue for now   - Pain adequately controlled s/p TAP block, Toradol Ofirmev and IV narcotics as needed  - Gas exchange ok on last ABG, on 2L O2 will wean as tolerated will check new abg now, IS  - Recheck lactate now, no other obvious signs of malperfusion  - NPO/NGT for now, monitor ostomy  - hypomagnesemia, being replaced, hyperchloremia, corrected anion gap is 18.5, likely from lactic acidosis. Maintain desai, s/p bladder repair  - No current ID concerns, SCIP abx  - SCDs, will discuss dvt chemo ppx with primary team  - d/w ICU attending

## 2024-02-08 LAB
ANION GAP SERPL CALC-SCNC: 10 MMOL/L — SIGNIFICANT CHANGE UP (ref 5–17)
BASOPHILS # BLD AUTO: 0.04 K/UL — SIGNIFICANT CHANGE UP (ref 0–0.2)
BASOPHILS # BLD AUTO: 0.05 K/UL — SIGNIFICANT CHANGE UP (ref 0–0.2)
BASOPHILS NFR BLD AUTO: 0.4 % — SIGNIFICANT CHANGE UP (ref 0–2)
BASOPHILS NFR BLD AUTO: 0.5 % — SIGNIFICANT CHANGE UP (ref 0–2)
BUN SERPL-MCNC: 12.1 MG/DL — SIGNIFICANT CHANGE UP (ref 8–20)
CALCIUM SERPL-MCNC: 8.3 MG/DL — LOW (ref 8.4–10.5)
CHLORIDE SERPL-SCNC: 106 MMOL/L — SIGNIFICANT CHANGE UP (ref 96–108)
CO2 SERPL-SCNC: 25 MMOL/L — SIGNIFICANT CHANGE UP (ref 22–29)
CREAT SERPL-MCNC: 0.94 MG/DL — SIGNIFICANT CHANGE UP (ref 0.5–1.3)
EGFR: 71 ML/MIN/1.73M2 — SIGNIFICANT CHANGE UP
EOSINOPHIL # BLD AUTO: 0.02 K/UL — SIGNIFICANT CHANGE UP (ref 0–0.5)
EOSINOPHIL # BLD AUTO: 0.03 K/UL — SIGNIFICANT CHANGE UP (ref 0–0.5)
EOSINOPHIL NFR BLD AUTO: 0.2 % — SIGNIFICANT CHANGE UP (ref 0–6)
EOSINOPHIL NFR BLD AUTO: 0.3 % — SIGNIFICANT CHANGE UP (ref 0–6)
GLUCOSE BLDC GLUCOMTR-MCNC: 94 MG/DL — SIGNIFICANT CHANGE UP (ref 70–99)
GLUCOSE SERPL-MCNC: 93 MG/DL — SIGNIFICANT CHANGE UP (ref 70–99)
HCT VFR BLD CALC: 23.2 % — LOW (ref 34.5–45)
HCT VFR BLD CALC: 23.5 % — LOW (ref 34.5–45)
HCT VFR BLD CALC: 24.1 % — LOW (ref 34.5–45)
HGB BLD-MCNC: 7.6 G/DL — LOW (ref 11.5–15.5)
HGB BLD-MCNC: 7.7 G/DL — LOW (ref 11.5–15.5)
HGB BLD-MCNC: 7.8 G/DL — LOW (ref 11.5–15.5)
IMM GRANULOCYTES NFR BLD AUTO: 0.5 % — SIGNIFICANT CHANGE UP (ref 0–0.9)
IMM GRANULOCYTES NFR BLD AUTO: 0.8 % — SIGNIFICANT CHANGE UP (ref 0–0.9)
LYMPHOCYTES # BLD AUTO: 0.89 K/UL — LOW (ref 1–3.3)
LYMPHOCYTES # BLD AUTO: 0.91 K/UL — LOW (ref 1–3.3)
LYMPHOCYTES # BLD AUTO: 8 % — LOW (ref 13–44)
LYMPHOCYTES # BLD AUTO: 8.6 % — LOW (ref 13–44)
MAGNESIUM SERPL-MCNC: 1.8 MG/DL — SIGNIFICANT CHANGE UP (ref 1.6–2.6)
MCHC RBC-ENTMCNC: 29.2 PG — SIGNIFICANT CHANGE UP (ref 27–34)
MCHC RBC-ENTMCNC: 30.5 PG — SIGNIFICANT CHANGE UP (ref 27–34)
MCHC RBC-ENTMCNC: 32 GM/DL — SIGNIFICANT CHANGE UP (ref 32–36)
MCHC RBC-ENTMCNC: 33.2 GM/DL — SIGNIFICANT CHANGE UP (ref 32–36)
MCV RBC AUTO: 91.3 FL — SIGNIFICANT CHANGE UP (ref 80–100)
MCV RBC AUTO: 91.8 FL — SIGNIFICANT CHANGE UP (ref 80–100)
MONOCYTES # BLD AUTO: 0.74 K/UL — SIGNIFICANT CHANGE UP (ref 0–0.9)
MONOCYTES # BLD AUTO: 0.75 K/UL — SIGNIFICANT CHANGE UP (ref 0–0.9)
MONOCYTES NFR BLD AUTO: 6.7 % — SIGNIFICANT CHANGE UP (ref 2–14)
MONOCYTES NFR BLD AUTO: 7 % — SIGNIFICANT CHANGE UP (ref 2–14)
NEUTROPHILS # BLD AUTO: 8.76 K/UL — HIGH (ref 1.8–7.4)
NEUTROPHILS # BLD AUTO: 9.35 K/UL — HIGH (ref 1.8–7.4)
NEUTROPHILS NFR BLD AUTO: 83.1 % — HIGH (ref 43–77)
NEUTROPHILS NFR BLD AUTO: 83.9 % — HIGH (ref 43–77)
PHOSPHATE SERPL-MCNC: 2.6 MG/DL — SIGNIFICANT CHANGE UP (ref 2.4–4.7)
PLATELET # BLD AUTO: 178 K/UL — SIGNIFICANT CHANGE UP (ref 150–400)
PLATELET # BLD AUTO: 187 K/UL — SIGNIFICANT CHANGE UP (ref 150–400)
POTASSIUM SERPL-MCNC: 3.4 MMOL/L — LOW (ref 3.5–5.3)
POTASSIUM SERPL-SCNC: 3.4 MMOL/L — LOW (ref 3.5–5.3)
RBC # BLD: 2.56 M/UL — LOW (ref 3.8–5.2)
RBC # BLD: 2.64 M/UL — LOW (ref 3.8–5.2)
RBC # FLD: 20.6 % — HIGH (ref 10.3–14.5)
RBC # FLD: 21 % — HIGH (ref 10.3–14.5)
SODIUM SERPL-SCNC: 141 MMOL/L — SIGNIFICANT CHANGE UP (ref 135–145)
WBC # BLD: 10.54 K/UL — HIGH (ref 3.8–10.5)
WBC # BLD: 11.14 K/UL — HIGH (ref 3.8–10.5)
WBC # FLD AUTO: 10.54 K/UL — HIGH (ref 3.8–10.5)
WBC # FLD AUTO: 11.14 K/UL — HIGH (ref 3.8–10.5)

## 2024-02-08 RX ORDER — FAMOTIDINE 10 MG/ML
20 INJECTION INTRAVENOUS
Refills: 0 | Status: DISCONTINUED | OUTPATIENT
Start: 2024-02-08 | End: 2024-02-08

## 2024-02-08 RX ORDER — BENZOCAINE AND MENTHOL 5; 1 G/100ML; G/100ML
1 LIQUID ORAL
Refills: 0 | Status: DISCONTINUED | OUTPATIENT
Start: 2024-02-08 | End: 2024-02-21

## 2024-02-08 RX ORDER — HEPARIN SODIUM 5000 [USP'U]/ML
5000 INJECTION INTRAVENOUS; SUBCUTANEOUS EVERY 8 HOURS
Refills: 0 | Status: DISCONTINUED | OUTPATIENT
Start: 2024-02-08 | End: 2024-02-11

## 2024-02-08 RX ORDER — POTASSIUM CHLORIDE 20 MEQ
20 PACKET (EA) ORAL
Refills: 0 | Status: COMPLETED | OUTPATIENT
Start: 2024-02-08 | End: 2024-02-08

## 2024-02-08 RX ORDER — FAMOTIDINE 10 MG/ML
20 INJECTION INTRAVENOUS EVERY 12 HOURS
Refills: 0 | Status: DISCONTINUED | OUTPATIENT
Start: 2024-02-08 | End: 2024-02-21

## 2024-02-08 RX ADMIN — Medication 15 MILLIGRAM(S): at 00:33

## 2024-02-08 RX ADMIN — Medication 15 MILLIGRAM(S): at 08:11

## 2024-02-08 RX ADMIN — Medication 50 MILLIEQUIVALENT(S): at 17:10

## 2024-02-08 RX ADMIN — BENZOCAINE AND MENTHOL 1 LOZENGE: 5; 1 LIQUID ORAL at 17:09

## 2024-02-08 RX ADMIN — Medication 15 MILLIGRAM(S): at 12:37

## 2024-02-08 RX ADMIN — FAMOTIDINE 20 MILLIGRAM(S): 10 INJECTION INTRAVENOUS at 23:07

## 2024-02-08 RX ADMIN — Medication 15 MILLIGRAM(S): at 06:10

## 2024-02-08 RX ADMIN — Medication 1000 MILLIGRAM(S): at 08:11

## 2024-02-08 RX ADMIN — Medication 10 MILLIGRAM(S): at 17:35

## 2024-02-08 RX ADMIN — Medication 15 MILLIGRAM(S): at 18:10

## 2024-02-08 RX ADMIN — Medication 15 MILLIGRAM(S): at 17:10

## 2024-02-08 RX ADMIN — Medication 50 MILLIEQUIVALENT(S): at 14:20

## 2024-02-08 RX ADMIN — HEPARIN SODIUM 5000 UNIT(S): 5000 INJECTION INTRAVENOUS; SUBCUTANEOUS at 23:07

## 2024-02-08 RX ADMIN — SODIUM CHLORIDE 75 MILLILITER(S): 9 INJECTION, SOLUTION INTRAVENOUS at 23:06

## 2024-02-08 RX ADMIN — Medication 15 MILLIGRAM(S): at 13:37

## 2024-02-08 NOTE — PROGRESS NOTE ADULT - SUBJECTIVE AND OBJECTIVE BOX
GYNECOLOGIC ONCOLOGY PROGRESS NOTE    PENELOPE FLAHERTY is a 58y now POD#2 s/p ex-lap AZALIA BSO, cytoreductive surgery HIPEC, ostomy creation, c/b bladder perforation, repaired intraop. Received 2u pRBC intraop.    S:    Patient was seen and examined at bedside.  Patient reports she has abdominal pain that is moderate to severe; however, she does not want to take any narcotics. She refused them overnight; however, she says her pain does improve moderately with tylenol and ibuprofen.  Pain in throat from NGT.   NPO; reports occasional nausea overnight.  Has not yet ambulated  Doe in place; draining pink tinged urine  She denies lightheadedness, dizziness, palpitations, chest pain and SOB.     OBJECTIVE:     VITALS:  T(F): 97.9 (02-08-24 @ 00:28), Max: 98.3 (02-07-24 @ 16:59)  HR: 89 (02-08-24 @ 00:28) (73 - 101)  BP: 129/71 (02-08-24 @ 00:28) (121/63 - 138/59)  RR: 17 (02-08-24 @ 00:28) (12 - 19)  SpO2: 94% (02-08-24 @ 00:28) (92% - 98%)  Wt(kg): --    I&O's Detail    07 Feb 2024 07:01  -  08 Feb 2024 07:00  --------------------------------------------------------  IN:  Total IN: 0 mL    OUT:    Indwelling Catheter - Urethral (mL): 980 mL  Total OUT: 980 mL    Total NET: -980 mL          MEDICATIONS  (STANDING):  atorvastatin 10 milliGRAM(s) Oral at bedtime  benzocaine/menthol Lozenge 1 Lozenge Oral two times a day  influenza   Vaccine 0.5 milliLiter(s) IntraMuscular once  ketorolac   Injectable 15 milliGRAM(s) IV Push every 6 hours  lactated ringers. 1000 milliLiter(s) (75 mL/Hr) IV Continuous <Continuous>  metoprolol succinate ER 25 milliGRAM(s) Oral daily  valsartan 40 milliGRAM(s) Oral daily    MEDICATIONS  (PRN):  metoclopramide Injectable 10 milliGRAM(s) IV Push every 6 hours PRN Nausea and/or Vomiting  morphine  - Injectable 2 milliGRAM(s) IV Push every 4 hours PRN Severe Pain (7 - 10)  ondansetron Injectable 2 milliGRAM(s) IV Push every 6 hours PRN Nausea and/or Vomiting  oxyCODONE    IR 5 milliGRAM(s) Oral once PRN Moderate to Severe Pain (4-10)  simethicone 80 milliGRAM(s) Chew every 6 hours PRN Gas      Physical Exam:  HEENT: NGT in place; 300 cc bilious output  Resp: breathing comfortably on RA; lungs CTAB  Abdomen: Soft, nondistended, tender to light palpation globally  GI: ostomy dusky, non productive; minimal serosanguineous fluid in bag; no gas or stool  Pelvic: no active vaginal bleeding   Incision: prevena in place, to suction  Extremities: No calf tenderness or edema     LABS:                        7.8    10.54 )-----------( 178      ( 08 Feb 2024 06:16 )             23.5     02-07    138  |  107  |  12.9  ----------------------------<  113<H>  3.8   |  23.0  |  0.82    Ca    7.9<L>      07 Feb 2024 09:14  Phos  4.3     02-07  Mg     2.0     02-07    TPro  4.8<L>  /  Alb  2.5<L>  /  TBili  0.4  /  DBili  x   /  AST  52<H>  /  ALT  59<H>  /  AlkPhos  56  02-07      Urinalysis Basic - ( 07 Feb 2024 09:14 )    Color: x / Appearance: x / SG: x / pH: x  Gluc: 113 mg/dL / Ketone: x  / Bili: x / Urobili: x   Blood: x / Protein: x / Nitrite: x   Leuk Esterase: x / RBC: x / WBC x   Sq Epi: x / Non Sq Epi: x / Bacteria: x           GYNECOLOGIC ONCOLOGY PROGRESS NOTE    PENELOPE FLAHERTY is a 58y now POD#2 s/p ex-lap AZALIA BSO, cytoreductive surgery HIPEC, ostomy creation, c/b bladder perforation, repaired intraop. Received 2u pRBC intraop.    S:    Patient was seen and examined at bedside.  Patient reports she has abdominal pain that is moderate to severe; however, she does not want to take any narcotics. She refused them overnight; however, she says her pain does improve moderately with tylenol and ibuprofen.  Pain in throat from NGT.   NPO; reports occasional nausea overnight.  Has not yet ambulated  Doe in place; draining pink tinged urine  She denies lightheadedness, dizziness, palpitations, chest pain and SOB.     OBJECTIVE:     VITALS:  T(F): 97.9 (02-08-24 @ 00:28), Max: 98.3 (02-07-24 @ 16:59)  HR: 89 (02-08-24 @ 00:28) (73 - 101)  BP: 129/71 (02-08-24 @ 00:28) (121/63 - 138/59)  RR: 17 (02-08-24 @ 00:28) (12 - 19)  SpO2: 94% (02-08-24 @ 00:28) (92% - 98%)  Wt(kg): --    I&O's Detail    07 Feb 2024 07:01  -  08 Feb 2024 07:00  --------------------------------------------------------  IN:  Total IN: 0 mL    OUT:    Indwelling Catheter - Urethral (mL): 980 mL  Total OUT: 980 mL    Total NET: -980 mL          MEDICATIONS  (STANDING):  atorvastatin 10 milliGRAM(s) Oral at bedtime  benzocaine/menthol Lozenge 1 Lozenge Oral two times a day  influenza   Vaccine 0.5 milliLiter(s) IntraMuscular once  ketorolac   Injectable 15 milliGRAM(s) IV Push every 6 hours  lactated ringers. 1000 milliLiter(s) (75 mL/Hr) IV Continuous <Continuous>  metoprolol succinate ER 25 milliGRAM(s) Oral daily  valsartan 40 milliGRAM(s) Oral daily    MEDICATIONS  (PRN):  metoclopramide Injectable 10 milliGRAM(s) IV Push every 6 hours PRN Nausea and/or Vomiting  morphine  - Injectable 2 milliGRAM(s) IV Push every 4 hours PRN Severe Pain (7 - 10)  ondansetron Injectable 2 milliGRAM(s) IV Push every 6 hours PRN Nausea and/or Vomiting  oxyCODONE    IR 5 milliGRAM(s) Oral once PRN Moderate to Severe Pain (4-10)  simethicone 80 milliGRAM(s) Chew every 6 hours PRN Gas      Physical Exam:  HEENT: NGT in place; 300 cc bilious output  Resp: breathing comfortably on RA; lungs CTAB  Abdomen: Soft, nondistended, tender to light palpation globally. No BS noted.   GI: ostomy dusky, non productive; minimal serosanguineous fluid in bag; no gas or stool  Pelvic: no active vaginal bleeding   Incision: prevena in place, to suction  Extremities: No calf tenderness or edema     LABS:                        7.8    10.54 )-----------( 178      ( 08 Feb 2024 06:16 )             23.5     02-07    138  |  107  |  12.9  ----------------------------<  113<H>  3.8   |  23.0  |  0.82    Ca    7.9<L>      07 Feb 2024 09:14  Phos  4.3     02-07  Mg     2.0     02-07    TPro  4.8<L>  /  Alb  2.5<L>  /  TBili  0.4  /  DBili  x   /  AST  52<H>  /  ALT  59<H>  /  AlkPhos  56  02-07      Urinalysis Basic - ( 07 Feb 2024 09:14 )    Color: x / Appearance: x / SG: x / pH: x  Gluc: 113 mg/dL / Ketone: x  / Bili: x / Urobili: x   Blood: x / Protein: x / Nitrite: x   Leuk Esterase: x / RBC: x / WBC x   Sq Epi: x / Non Sq Epi: x / Bacteria: x

## 2024-02-08 NOTE — CDI QUERY NOTE - NSCDIOTHERTXTBX_GEN_ALL_CORE_HH
Patient is documented with a hemoglobin trend of 9.1 down to 7.7 following surgery with 500 ml estimated blood loss. She received 2 units RBCs intraop. Is there an additional diagnosis related to these findings, such as:     -Acute blood loss anemia due to surgery  -Anemia due to neoplasm  -Anemia due to antineoplastic therapy  -Other  -Clinically insignificant findings    SUPPORTING DOCUMENTATION:    -2/8 H&P: ... currently receiving chemotherapy... recently diagnosed with ovarian cancer... She denies vaginal bleeding... cheduled for exploratory laparotomy abdominal hysterectomy, bilateral salpingo-oophorectomy, cytoreductive surgery, HIPEC     -2/6 Brief Operative Note: ... AZALIA & BSO (total abdominal hysterectomy and bilateral salpingo-oophorectomy)... Debulking, neoplasm, malignant, intra-abdominal, with HIPEC... diaphragrmatic peritoneum resection, small bowel and rectosigmoid implant excision, mesentary repair... Repair, cystotomy... Resection, rectosigmoid, with colostomy... · Estimated Blood Loss  500 milliLiter(s)    -2/8 GYN ONC Note: ... s/p ex-lap AZALIA BSO, cytoreductive surgery HIPEC, ostomy creation, c/b bladder perforation, repaired intraop. Received 2u pRBC intraop... Heme: Hgb 9.7 ->8.54 > 7.8      HEMOGLOBIN LEVELS:  Hemoglobin: 7.7 g/dL *L* [11.5 - 15.5] (02-08-24)  Hemoglobin: 7.6 g/dL *L* [11.5 - 15.5] (02-08-24)  Hemoglobin: 7.8 g/dL *L* [11.5 - 15.5] (02-08-24)  Hemoglobin: 8.4 g/dL *L* [11.5 - 15.5] (02-07-24)  Hemoglobin: 9.7 g/dL *L* [11.5 - 15.5] (02-07-24)  Hemoglobin: 9.4 g/dL *L* [11.5 - 15.5] (02-06-24)  Hemoglobin: 10.6 g/dL *L* [11.5 - 15.5] (02-06-24)  Hemoglobin: 9.1 g/dL *L* [11.5 - 15.5] (01-22-24)

## 2024-02-08 NOTE — CDI QUERY NOTE - NSCDINOTECODERS_GEN_A_CORE
09/20/17 1200   Groups   Details 1100 Dual Group - active participant.   Pt joined late.  Apologetic.  Fell asleep.  Pleased that she was awakened by staff.   Offered supportive feedback to peers.  Role model behavior.     CDI Specialist

## 2024-02-08 NOTE — PROGRESS NOTE ADULT - ASSESSMENT
PENELOPE FLAHERTY is a 58y now POD#2 s/p ex-lap AZALIA BSO, cytoreductive surgery, HIPEC, ostomy creation, c/b bladder perforation, repaired intraop. Received 2u pRBC intraop.    Neuro: Continue current pain regimen. Encourage prn Morphine for break through pain. Cepacol ordered for throat irritation  CV: Blood pressure well controlled.  Pulm: Saturating well on room air. Incentive spirometer use encouraged  GI: ostomy dusky, non productive. NGT in place w dark output. 300cc overnight NPO  : Doe in place, s/p bladder repair intraop; to stay in for at least 7d; plan for CT urogram in patient if patient admitted 1 week post operatively. UOP adequate.  Heme: Hgb 9.7 ->8.54 > 7.8  ID: Afebrile. WBC 10.54. No antibiotics indicated at this time.   FEN: IVF 75cc/h. Resolved HAGMA. Lactate downtrended to 1.5.  Replete electrolytes prn.  AM  labs pending   Skin: No active disease.   Psych: No active disease.   DVT ppx: Ambulation encouraged, SCDs when in bed, heparin ordered  Dispo: Continued in patient management PENELOPE FLAHERTY is a 58y now POD#2 s/p ex-lap AZALIA BSO, cytoreductive surgery, HIPEC, ostomy creation, c/b bladder perforation, repaired intraop. Received 2u pRBC intraop.    Neuro: Continue current pain regimen. Encourage prn Morphine for break through pain. Cepacol ordered for throat irritation  CV: Blood pressure well controlled.  Pulm: Saturating well on room air. Incentive spirometer use encouraged  GI: ostomy dusky, non productive. NGT in place w dark output. 300cc overnight NPO  : Doe in place, s/p bladder repair intraop; to stay in for at least 7d; plan for CT urogram in patient if patient admitted 1 week post operatively. UOP adequate.  Heme: Acute blood loss anemia Hgb 9.7 ->8.54 > 7.8; stable, asymptomatic  ID: Afebrile. WBC 10.54. No antibiotics indicated at this time.   FEN: IVF 75cc/h. Resolved HAGMA. Lactate downtrended to 1.5.  Replete electrolytes prn.  AM  labs pending   Skin: No active disease.   Psych: No active disease.   DVT ppx: Ambulation encouraged, SCDs when in bed, heparin ordered  Dispo: Continued in patient management

## 2024-02-09 LAB
ALBUMIN SERPL ELPH-MCNC: 2.6 G/DL — LOW (ref 3.3–5.2)
ALP SERPL-CCNC: 70 U/L — SIGNIFICANT CHANGE UP (ref 40–120)
ALT FLD-CCNC: 27 U/L — SIGNIFICANT CHANGE UP
ANION GAP SERPL CALC-SCNC: 15 MMOL/L — SIGNIFICANT CHANGE UP (ref 5–17)
AST SERPL-CCNC: 20 U/L — SIGNIFICANT CHANGE UP
BASOPHILS # BLD AUTO: 0.04 K/UL — SIGNIFICANT CHANGE UP (ref 0–0.2)
BASOPHILS NFR BLD AUTO: 0.4 % — SIGNIFICANT CHANGE UP (ref 0–2)
BILIRUB DIRECT SERPL-MCNC: 0.1 MG/DL — SIGNIFICANT CHANGE UP (ref 0–0.3)
BILIRUB INDIRECT FLD-MCNC: 0.4 MG/DL — SIGNIFICANT CHANGE UP (ref 0.2–1)
BILIRUB SERPL-MCNC: 0.5 MG/DL — SIGNIFICANT CHANGE UP (ref 0.4–2)
BUN SERPL-MCNC: 13.6 MG/DL — SIGNIFICANT CHANGE UP (ref 8–20)
CALCIUM SERPL-MCNC: 8.4 MG/DL — SIGNIFICANT CHANGE UP (ref 8.4–10.5)
CHLORIDE SERPL-SCNC: 100 MMOL/L — SIGNIFICANT CHANGE UP (ref 96–108)
CO2 SERPL-SCNC: 24 MMOL/L — SIGNIFICANT CHANGE UP (ref 22–29)
CREAT SERPL-MCNC: 0.77 MG/DL — SIGNIFICANT CHANGE UP (ref 0.5–1.3)
EGFR: 90 ML/MIN/1.73M2 — SIGNIFICANT CHANGE UP
EOSINOPHIL # BLD AUTO: 0.07 K/UL — SIGNIFICANT CHANGE UP (ref 0–0.5)
EOSINOPHIL NFR BLD AUTO: 0.7 % — SIGNIFICANT CHANGE UP (ref 0–6)
GLUCOSE SERPL-MCNC: 75 MG/DL — SIGNIFICANT CHANGE UP (ref 70–99)
HCT VFR BLD CALC: 22.4 % — LOW (ref 34.5–45)
HGB BLD-MCNC: 7.3 G/DL — LOW (ref 11.5–15.5)
IMM GRANULOCYTES NFR BLD AUTO: 0.6 % — SIGNIFICANT CHANGE UP (ref 0–0.9)
LYMPHOCYTES # BLD AUTO: 1.08 K/UL — SIGNIFICANT CHANGE UP (ref 1–3.3)
LYMPHOCYTES # BLD AUTO: 10.4 % — LOW (ref 13–44)
MAGNESIUM SERPL-MCNC: 1.7 MG/DL — SIGNIFICANT CHANGE UP (ref 1.6–2.6)
MCHC RBC-ENTMCNC: 29.9 PG — SIGNIFICANT CHANGE UP (ref 27–34)
MCHC RBC-ENTMCNC: 32.6 GM/DL — SIGNIFICANT CHANGE UP (ref 32–36)
MCV RBC AUTO: 91.8 FL — SIGNIFICANT CHANGE UP (ref 80–100)
MONOCYTES # BLD AUTO: 0.67 K/UL — SIGNIFICANT CHANGE UP (ref 0–0.9)
MONOCYTES NFR BLD AUTO: 6.5 % — SIGNIFICANT CHANGE UP (ref 2–14)
NEUTROPHILS # BLD AUTO: 8.45 K/UL — HIGH (ref 1.8–7.4)
NEUTROPHILS NFR BLD AUTO: 81.4 % — HIGH (ref 43–77)
PHOSPHATE SERPL-MCNC: 2.4 MG/DL — SIGNIFICANT CHANGE UP (ref 2.4–4.7)
PLATELET # BLD AUTO: 180 K/UL — SIGNIFICANT CHANGE UP (ref 150–400)
POTASSIUM SERPL-MCNC: 3.1 MMOL/L — LOW (ref 3.5–5.3)
POTASSIUM SERPL-SCNC: 3.1 MMOL/L — LOW (ref 3.5–5.3)
PROT SERPL-MCNC: 5.6 G/DL — LOW (ref 6.6–8.7)
RBC # BLD: 2.44 M/UL — LOW (ref 3.8–5.2)
RBC # FLD: 19.5 % — HIGH (ref 10.3–14.5)
SODIUM SERPL-SCNC: 139 MMOL/L — SIGNIFICANT CHANGE UP (ref 135–145)
WBC # BLD: 10.37 K/UL — SIGNIFICANT CHANGE UP (ref 3.8–10.5)
WBC # FLD AUTO: 10.37 K/UL — SIGNIFICANT CHANGE UP (ref 3.8–10.5)

## 2024-02-09 RX ORDER — VALSARTAN 80 MG/1
40 TABLET ORAL DAILY
Refills: 0 | Status: DISCONTINUED | OUTPATIENT
Start: 2024-02-09 | End: 2024-02-21

## 2024-02-09 RX ORDER — POTASSIUM CHLORIDE 20 MEQ
20 PACKET (EA) ORAL
Refills: 0 | Status: COMPLETED | OUTPATIENT
Start: 2024-02-09 | End: 2024-02-09

## 2024-02-09 RX ORDER — ACETAMINOPHEN 500 MG
1000 TABLET ORAL ONCE
Refills: 0 | Status: COMPLETED | OUTPATIENT
Start: 2024-02-09 | End: 2024-02-10

## 2024-02-09 RX ADMIN — BENZOCAINE AND MENTHOL 1 LOZENGE: 5; 1 LIQUID ORAL at 17:08

## 2024-02-09 RX ADMIN — Medication 10 MILLIGRAM(S): at 17:08

## 2024-02-09 RX ADMIN — Medication 15 MILLIGRAM(S): at 05:58

## 2024-02-09 RX ADMIN — Medication 15 MILLIGRAM(S): at 17:08

## 2024-02-09 RX ADMIN — Medication 15 MILLIGRAM(S): at 00:15

## 2024-02-09 RX ADMIN — SODIUM CHLORIDE 75 MILLILITER(S): 9 INJECTION, SOLUTION INTRAVENOUS at 09:40

## 2024-02-09 RX ADMIN — Medication 15 MILLIGRAM(S): at 00:00

## 2024-02-09 RX ADMIN — FAMOTIDINE 20 MILLIGRAM(S): 10 INJECTION INTRAVENOUS at 17:08

## 2024-02-09 RX ADMIN — Medication 15 MILLIGRAM(S): at 06:00

## 2024-02-09 RX ADMIN — HEPARIN SODIUM 5000 UNIT(S): 5000 INJECTION INTRAVENOUS; SUBCUTANEOUS at 22:22

## 2024-02-09 RX ADMIN — SODIUM CHLORIDE 75 MILLILITER(S): 9 INJECTION, SOLUTION INTRAVENOUS at 22:22

## 2024-02-09 RX ADMIN — Medication 50 MILLIEQUIVALENT(S): at 14:26

## 2024-02-09 RX ADMIN — HEPARIN SODIUM 5000 UNIT(S): 5000 INJECTION INTRAVENOUS; SUBCUTANEOUS at 07:28

## 2024-02-09 RX ADMIN — Medication 15 MILLIGRAM(S): at 14:09

## 2024-02-09 RX ADMIN — Medication 50 MILLIEQUIVALENT(S): at 16:17

## 2024-02-09 RX ADMIN — Medication 15 MILLIGRAM(S): at 17:59

## 2024-02-09 RX ADMIN — Medication 15 MILLIGRAM(S): at 23:17

## 2024-02-09 RX ADMIN — FAMOTIDINE 20 MILLIGRAM(S): 10 INJECTION INTRAVENOUS at 05:58

## 2024-02-09 RX ADMIN — VALSARTAN 40 MILLIGRAM(S): 80 TABLET ORAL at 12:31

## 2024-02-09 RX ADMIN — Medication 15 MILLIGRAM(S): at 12:31

## 2024-02-09 RX ADMIN — Medication 50 MILLIEQUIVALENT(S): at 12:30

## 2024-02-09 RX ADMIN — HEPARIN SODIUM 5000 UNIT(S): 5000 INJECTION INTRAVENOUS; SUBCUTANEOUS at 14:32

## 2024-02-09 NOTE — PROVIDER CONTACT NOTE (OTHER) - SITUATION
patient alert x4, NAD. provider to RN to flush desai catheter with 20cc Normal Saline
patient alert x4, NAD daughter at bedside

## 2024-02-09 NOTE — PROVIDER CONTACT NOTE (OTHER) - ACTION/TREATMENT ORDERED:
Desai catheter flushed with 20cc normal saline with small clot excavation and now desai draining blood tinged pink color urine  will continue to monitor
as per OBGYN will come to bedside and will let RN know if ok to give heparin

## 2024-02-09 NOTE — PROGRESS NOTE ADULT - SUBJECTIVE AND OBJECTIVE BOX
GYNECOLOGIC ONCOLOGY PROGRESS NOTE    PENELOPE FLAHERTY is a 58y now POD3 s/p ex-lap AZALIA BSO, cytoreductive surgery HIPEC, ostomy creation, c/b bladder perforation, repaired intraop. Received 2u pRBC intraop.    S:    Patient was seen and examined at bedside.  Patient reports her pain is improved from yesterday now moderate. Abdominal binder in place. Not requesting morphine.   Pain in throat from NGT mildly improved with lozenges   NPO; reports continued nausea overnight.  Desai in place; urine clearing up; small amount of clots noted in desai bag  She denies lightheadedness, dizziness, palpitations, chest pain and SOB.     OBJECTIVE:     VITALS:  T(F): 98.9 (02-09-24 @ 00:13), Max: 98.9 (02-09-24 @ 00:13)  HR: 98 (02-09-24 @ 00:13) (95 - 100)  BP: 140/78 (02-09-24 @ 00:13) (140/78 - 155/78)  RR: 18 (02-09-24 @ 00:13) (18 - 19)  SpO2: 95% (02-09-24 @ 00:13) (90% - 95%)  Wt(kg): --    I&O's Detail    07 Feb 2024 07:01  -  08 Feb 2024 07:00  --------------------------------------------------------  IN:    Lactated Ringers: 75 mL  Total IN: 75 mL    OUT:    Indwelling Catheter - Urethral (mL): 980 mL  Total OUT: 980 mL    Total NET: -905 mL      08 Feb 2024 07:01  -  09 Feb 2024 06:02  --------------------------------------------------------  IN:    Lactated Ringers: 1500 mL  Total IN: 1500 mL    OUT:    Colostomy (mL): 10 mL    Indwelling Catheter - Urethral (mL): 1760 mL    Nasogastric/Oral tube (mL): 1050 mL  Total OUT: 2820 mL    Total NET: -1320 mL          MEDICATIONS  (STANDING):  benzocaine/menthol Lozenge 1 Lozenge Oral two times a day  famotidine Injectable 20 milliGRAM(s) IV Push every 12 hours  heparin   Injectable 5000 Unit(s) SubCutaneous every 8 hours  influenza   Vaccine 0.5 milliLiter(s) IntraMuscular once  ketorolac   Injectable 15 milliGRAM(s) IV Push every 6 hours  lactated ringers. 1000 milliLiter(s) (75 mL/Hr) IV Continuous <Continuous>  metoprolol succinate ER 25 milliGRAM(s) Oral daily  valsartan 40 milliGRAM(s) Oral daily    MEDICATIONS  (PRN):  metoclopramide Injectable 10 milliGRAM(s) IV Push every 6 hours PRN Nausea and/or Vomiting  morphine  - Injectable 2 milliGRAM(s) IV Push every 4 hours PRN Severe Pain (7 - 10)  ondansetron Injectable 2 milliGRAM(s) IV Push every 6 hours PRN Nausea and/or Vomiting  oxyCODONE    IR 5 milliGRAM(s) Oral once PRN Moderate to Severe Pain (4-10)  simethicone 80 milliGRAM(s) Chew every 6 hours PRN Gas      Physical Exam:  HEENT: NGT in place; 600 cc dark output  Resp: breathing comfortably on RA; lungs CTAB  Abdomen: Soft, nondistended, tender to palpation globally; most significant in the right upper quadrant. No BS noted.   GI: ostomy, non productive; minimal serosanguineous fluid in bag; no gas or stool  Pelvic: no active vaginal bleeding   Incision: prevena in place, to suction  Extremities: No calf tenderness or edema. SCDs in place

## 2024-02-09 NOTE — PROGRESS NOTE ADULT - ASSESSMENT
PENELOPE FLAHERTY is a 58y now POD#3 s/p ex-lap AZALIA BSO, cytoreductive surgery, HIPEC, ostomy creation, c/b bladder perforation, repaired intraop. Received 2u pRBC intraop.    Neuro: Continue current pain regimen. Encourage prn Morphine for break through pain. Cepacol ordered for throat irritation  CV: Blood pressure moderately controlled.  Pulm: Saturating well on room air. Incentive spirometer use encouraged  GI: ostomy non productive. NGT in place w dark output. 600cc overnight NPO  : Doe in place, s/p bladder repair intraop; to stay in for at least 7d; plan for CT urogram in patient if patient admitted 1 week post operatively. UOP adequate.  Heme: Acute blood loss anemia Hgb 9.7 ->8.54 > 7.8>7.6>7.7> AM abs pending stable, asymptomatic  ID: Afebrile. WBC 11.14> AM labs pending No antibiotics indicated at this time.   FEN: IVF 75cc/h. Resolved HAGMA. Lactate downtrended to 1.5.  Replete electrolytes prn.  AM  labs pending   Skin: No active disease.   Psych: No active disease.   DVT ppx: Ambulation encouraged, SCDs when in bed, heparin ordered  Dispo: Continued in patient management   PENELOPE FLAHERTY is a 58y now POD#3 s/p ex-lap AZALIA BSO, cytoreductive surgery, HIPEC, ostomy creation, c/b bladder perforation, repaired intraop. Received 2u pRBC intraop.    Neuro: Continue current pain regimen. Encourage prn Morphine for break through pain. Cepacol ordered for throat irritation  CV: Blood pressure moderately controlled.  Pulm: Saturating well on room air. Incentive spirometer use encouraged  GI: ostomy non productive. NGT in place w dark output. 600cc overnight NPO. Famotidine started.  : Doe in place, s/p bladder repair intraop; to stay in for at least 7d; plan for CT urogram in patient if patient admitted 1 week post operatively. UOP adequate.  Heme: Acute blood loss anemia Hgb 9.7 ->8.54 > 7.8>7.6>7.7> AM abs pending stable, asymptomatic  ID: Afebrile. WBC 11.14> AM labs pending No antibiotics indicated at this time.   FEN: IVF 75cc/h. Resolved HAGMA. Lactate downtrended to 1.5.  Replete electrolytes prn.  AM  labs pending   Skin: No active disease.   Psych: No active disease.   DVT ppx: Ambulation encouraged, SCDs when in bed, heparin ordered  Dispo: Continued in patient management

## 2024-02-10 LAB
ALBUMIN SERPL ELPH-MCNC: 2.6 G/DL — LOW (ref 3.3–5.2)
ALP SERPL-CCNC: 68 U/L — SIGNIFICANT CHANGE UP (ref 40–120)
ALT FLD-CCNC: 20 U/L — SIGNIFICANT CHANGE UP
ANION GAP SERPL CALC-SCNC: 17 MMOL/L — SIGNIFICANT CHANGE UP (ref 5–17)
ANION GAP SERPL CALC-SCNC: 21 MMOL/L — HIGH (ref 5–17)
AST SERPL-CCNC: 16 U/L — SIGNIFICANT CHANGE UP
BASOPHILS # BLD AUTO: 0.04 K/UL — SIGNIFICANT CHANGE UP (ref 0–0.2)
BASOPHILS # BLD AUTO: 0.06 K/UL — SIGNIFICANT CHANGE UP (ref 0–0.2)
BASOPHILS NFR BLD AUTO: 0.3 % — SIGNIFICANT CHANGE UP (ref 0–2)
BASOPHILS NFR BLD AUTO: 0.4 % — SIGNIFICANT CHANGE UP (ref 0–2)
BILIRUB SERPL-MCNC: 0.6 MG/DL — SIGNIFICANT CHANGE UP (ref 0.4–2)
BUN SERPL-MCNC: 15.1 MG/DL — SIGNIFICANT CHANGE UP (ref 8–20)
BUN SERPL-MCNC: 18.4 MG/DL — SIGNIFICANT CHANGE UP (ref 8–20)
CALCIUM SERPL-MCNC: 8.5 MG/DL — SIGNIFICANT CHANGE UP (ref 8.4–10.5)
CALCIUM SERPL-MCNC: 8.5 MG/DL — SIGNIFICANT CHANGE UP (ref 8.4–10.5)
CHLORIDE SERPL-SCNC: 100 MMOL/L — SIGNIFICANT CHANGE UP (ref 96–108)
CHLORIDE SERPL-SCNC: 100 MMOL/L — SIGNIFICANT CHANGE UP (ref 96–108)
CO2 SERPL-SCNC: 22 MMOL/L — SIGNIFICANT CHANGE UP (ref 22–29)
CO2 SERPL-SCNC: 23 MMOL/L — SIGNIFICANT CHANGE UP (ref 22–29)
CREAT SERPL-MCNC: 0.82 MG/DL — SIGNIFICANT CHANGE UP (ref 0.5–1.3)
CREAT SERPL-MCNC: 0.85 MG/DL — SIGNIFICANT CHANGE UP (ref 0.5–1.3)
EGFR: 80 ML/MIN/1.73M2 — SIGNIFICANT CHANGE UP
EGFR: 84 ML/MIN/1.73M2 — SIGNIFICANT CHANGE UP
EOSINOPHIL # BLD AUTO: 0.02 K/UL — SIGNIFICANT CHANGE UP (ref 0–0.5)
EOSINOPHIL # BLD AUTO: 0.24 K/UL — SIGNIFICANT CHANGE UP (ref 0–0.5)
EOSINOPHIL NFR BLD AUTO: 0.1 % — SIGNIFICANT CHANGE UP (ref 0–6)
EOSINOPHIL NFR BLD AUTO: 1.5 % — SIGNIFICANT CHANGE UP (ref 0–6)
GLUCOSE SERPL-MCNC: 72 MG/DL — SIGNIFICANT CHANGE UP (ref 70–99)
GLUCOSE SERPL-MCNC: 85 MG/DL — SIGNIFICANT CHANGE UP (ref 70–99)
HCT VFR BLD CALC: 21.2 % — LOW (ref 34.5–45)
HCT VFR BLD CALC: 21.6 % — LOW (ref 34.5–45)
HGB BLD-MCNC: 7.2 G/DL — LOW (ref 11.5–15.5)
HGB BLD-MCNC: 7.2 G/DL — LOW (ref 11.5–15.5)
IMM GRANULOCYTES NFR BLD AUTO: 0.5 % — SIGNIFICANT CHANGE UP (ref 0–0.9)
IMM GRANULOCYTES NFR BLD AUTO: 0.5 % — SIGNIFICANT CHANGE UP (ref 0–0.9)
LYMPHOCYTES # BLD AUTO: 0.94 K/UL — LOW (ref 1–3.3)
LYMPHOCYTES # BLD AUTO: 0.96 K/UL — LOW (ref 1–3.3)
LYMPHOCYTES # BLD AUTO: 5.9 % — LOW (ref 13–44)
LYMPHOCYTES # BLD AUTO: 6.9 % — LOW (ref 13–44)
MAGNESIUM SERPL-MCNC: 1.5 MG/DL — LOW (ref 1.6–2.6)
MAGNESIUM SERPL-MCNC: 2.5 MG/DL — SIGNIFICANT CHANGE UP (ref 1.6–2.6)
MCHC RBC-ENTMCNC: 30.8 PG — SIGNIFICANT CHANGE UP (ref 27–34)
MCHC RBC-ENTMCNC: 30.8 PG — SIGNIFICANT CHANGE UP (ref 27–34)
MCHC RBC-ENTMCNC: 33.3 GM/DL — SIGNIFICANT CHANGE UP (ref 32–36)
MCHC RBC-ENTMCNC: 34 GM/DL — SIGNIFICANT CHANGE UP (ref 32–36)
MCV RBC AUTO: 90.6 FL — SIGNIFICANT CHANGE UP (ref 80–100)
MCV RBC AUTO: 92.3 FL — SIGNIFICANT CHANGE UP (ref 80–100)
MONOCYTES # BLD AUTO: 0.87 K/UL — SIGNIFICANT CHANGE UP (ref 0–0.9)
MONOCYTES # BLD AUTO: 1.06 K/UL — HIGH (ref 0–0.9)
MONOCYTES NFR BLD AUTO: 6.4 % — SIGNIFICANT CHANGE UP (ref 2–14)
MONOCYTES NFR BLD AUTO: 6.5 % — SIGNIFICANT CHANGE UP (ref 2–14)
NEUTROPHILS # BLD AUTO: 11.63 K/UL — HIGH (ref 1.8–7.4)
NEUTROPHILS # BLD AUTO: 13.91 K/UL — HIGH (ref 1.8–7.4)
NEUTROPHILS NFR BLD AUTO: 85.2 % — HIGH (ref 43–77)
NEUTROPHILS NFR BLD AUTO: 85.8 % — HIGH (ref 43–77)
PHOSPHATE SERPL-MCNC: 4.4 MG/DL — SIGNIFICANT CHANGE UP (ref 2.4–4.7)
PHOSPHATE SERPL-MCNC: 4.5 MG/DL — SIGNIFICANT CHANGE UP (ref 2.4–4.7)
PLATELET # BLD AUTO: 187 K/UL — SIGNIFICANT CHANGE UP (ref 150–400)
PLATELET # BLD AUTO: 197 K/UL — SIGNIFICANT CHANGE UP (ref 150–400)
POTASSIUM SERPL-MCNC: 3.1 MMOL/L — LOW (ref 3.5–5.3)
POTASSIUM SERPL-MCNC: 3.6 MMOL/L — SIGNIFICANT CHANGE UP (ref 3.5–5.3)
POTASSIUM SERPL-SCNC: 3.1 MMOL/L — LOW (ref 3.5–5.3)
POTASSIUM SERPL-SCNC: 3.6 MMOL/L — SIGNIFICANT CHANGE UP (ref 3.5–5.3)
PROT SERPL-MCNC: 5.7 G/DL — LOW (ref 6.6–8.7)
RBC # BLD: 2.34 M/UL — LOW (ref 3.8–5.2)
RBC # BLD: 2.34 M/UL — LOW (ref 3.8–5.2)
RBC # FLD: 18.3 % — HIGH (ref 10.3–14.5)
RBC # FLD: 18.6 % — HIGH (ref 10.3–14.5)
SODIUM SERPL-SCNC: 140 MMOL/L — SIGNIFICANT CHANGE UP (ref 135–145)
SODIUM SERPL-SCNC: 142 MMOL/L — SIGNIFICANT CHANGE UP (ref 135–145)
WBC # BLD: 13.57 K/UL — HIGH (ref 3.8–10.5)
WBC # BLD: 16.61 K/UL — HIGH (ref 3.8–10.5)
WBC # FLD AUTO: 13.57 K/UL — HIGH (ref 3.8–10.5)
WBC # FLD AUTO: 16.61 K/UL — HIGH (ref 3.8–10.5)

## 2024-02-10 RX ORDER — BENZOCAINE 10 %
1 GEL (GRAM) MUCOUS MEMBRANE EVERY 6 HOURS
Refills: 0 | Status: DISCONTINUED | OUTPATIENT
Start: 2024-02-10 | End: 2024-02-21

## 2024-02-10 RX ORDER — DEXTROSE MONOHYDRATE, SODIUM CHLORIDE, AND POTASSIUM CHLORIDE 50; .745; 4.5 G/1000ML; G/1000ML; G/1000ML
1000 INJECTION, SOLUTION INTRAVENOUS
Refills: 0 | Status: DISCONTINUED | OUTPATIENT
Start: 2024-02-10 | End: 2024-02-13

## 2024-02-10 RX ORDER — MAGNESIUM SULFATE 500 MG/ML
2 VIAL (ML) INJECTION ONCE
Refills: 0 | Status: COMPLETED | OUTPATIENT
Start: 2024-02-10 | End: 2024-02-10

## 2024-02-10 RX ORDER — POTASSIUM CHLORIDE 20 MEQ
10 PACKET (EA) ORAL
Refills: 0 | Status: COMPLETED | OUTPATIENT
Start: 2024-02-10 | End: 2024-02-10

## 2024-02-10 RX ORDER — PANTOPRAZOLE SODIUM 20 MG/1
40 TABLET, DELAYED RELEASE ORAL DAILY
Refills: 0 | Status: DISCONTINUED | OUTPATIENT
Start: 2024-02-10 | End: 2024-02-11

## 2024-02-10 RX ADMIN — Medication 15 MILLIGRAM(S): at 11:15

## 2024-02-10 RX ADMIN — BENZOCAINE AND MENTHOL 1 LOZENGE: 5; 1 LIQUID ORAL at 18:54

## 2024-02-10 RX ADMIN — Medication 25 GRAM(S): at 11:15

## 2024-02-10 RX ADMIN — HEPARIN SODIUM 5000 UNIT(S): 5000 INJECTION INTRAVENOUS; SUBCUTANEOUS at 22:36

## 2024-02-10 RX ADMIN — HEPARIN SODIUM 5000 UNIT(S): 5000 INJECTION INTRAVENOUS; SUBCUTANEOUS at 15:11

## 2024-02-10 RX ADMIN — Medication 100 MILLIEQUIVALENT(S): at 15:11

## 2024-02-10 RX ADMIN — Medication 15 MILLIGRAM(S): at 06:17

## 2024-02-10 RX ADMIN — Medication 100 MILLIEQUIVALENT(S): at 12:51

## 2024-02-10 RX ADMIN — Medication 100 MILLIEQUIVALENT(S): at 11:15

## 2024-02-10 RX ADMIN — DEXTROSE MONOHYDRATE, SODIUM CHLORIDE, AND POTASSIUM CHLORIDE 75 MILLILITER(S): 50; .745; 4.5 INJECTION, SOLUTION INTRAVENOUS at 22:36

## 2024-02-10 RX ADMIN — Medication 15 MILLIGRAM(S): at 00:05

## 2024-02-10 RX ADMIN — Medication 15 MILLIGRAM(S): at 18:53

## 2024-02-10 RX ADMIN — FAMOTIDINE 20 MILLIGRAM(S): 10 INJECTION INTRAVENOUS at 18:53

## 2024-02-10 RX ADMIN — BENZOCAINE AND MENTHOL 1 LOZENGE: 5; 1 LIQUID ORAL at 05:20

## 2024-02-10 RX ADMIN — Medication 1 SPRAY(S): at 18:54

## 2024-02-10 RX ADMIN — HEPARIN SODIUM 5000 UNIT(S): 5000 INJECTION INTRAVENOUS; SUBCUTANEOUS at 05:20

## 2024-02-10 RX ADMIN — FAMOTIDINE 20 MILLIGRAM(S): 10 INJECTION INTRAVENOUS at 05:20

## 2024-02-10 RX ADMIN — Medication 15 MILLIGRAM(S): at 05:20

## 2024-02-10 RX ADMIN — Medication 15 MILLIGRAM(S): at 19:00

## 2024-02-10 RX ADMIN — Medication 400 MILLIGRAM(S): at 18:22

## 2024-02-10 NOTE — PROGRESS NOTE ADULT - ASSESSMENT
PENELOPE FLAHERTY is a 58y now POD#4 s/p ex-lap AZALIA BSO, cytoreductive surgery, HIPEC, ostomy creation, c/b bladder perforation, repaired intraop. Received 2u pRBC intraop.    Neuro: Continue current pain regimen. Cepacol ordered for throat irritation  CV: Blood pressure moderately controlled. Home meds ordered.  Pulm: Saturating well on room air. Incentive spirometer use encouraged  GI: ostomy non productive. NGT in place w dark output. 400cc overnight NPO. Famotidine ordered  : Doe in place, s/p bladder repair intraop; to stay in for at least 7d; plan for CT urogram in patient if patient admitted 1 week post operatively. UOP adequate.  Heme: Acute blood loss anemia Hgb 9.7 ->8.54 > 7.8>7.6>7.7> AM abs pending stable, asymptomatic  ID: Afebrile. WBC 11.14> AM labs pending No antibiotics indicated at this time.   FEN: IVF 75cc/h. Resolved HAGMA. Lactate downtrended to 1.5.  Replete electrolytes prn.  Hypokalemia, K repleted, hypomagnesemia, repleted.  Skin: No active disease.   Psych: No active disease.   DVT ppx: Ambulation encouraged, SCDs when in bed, heparin ordered  Dispo: Continued in patient management   PENELOPE FLAHERTY is a 58y now POD#4 s/p ex-lap AZALIA BSO, cytoreductive surgery, HIPEC, ostomy creation, c/b bladder perforation, repaired intraop. Received 2u pRBC intraop.    Neuro: Continue current pain regimen. Cepacol ordered for throat irritation  CV: Blood pressure moderately controlled. Home meds ordered.  Pulm: Saturating well on room air. Incentive spirometer use encouraged  GI: ostomy non productive. NGT in place w dark output. 400cc overnight NPO. Famotidine ordered  : Doe in place, s/p bladder repair intraop; to stay in for at least 7d; plan for CT urogram in patient if patient admitted 1 week post operatively. UOP adequate.  Heme: Acute blood loss anemia Hgb 9.7 ->8.54 > 7.8>7.6>7.7> AM abs pending stable, asymptomatic  ID: Afebrile. WBC 11.14> 10.37>16.61 uptrending, afebrile CBC without diff. No antibiotics indicated at this time.   FEN: IVF 75cc/h. Resolved HAGMA. Lactate downtrended to 1.5.  Replete electrolytes prn.  Hypokalemia, K repleted, hypomagnesemia, repleted.  Skin: No active disease.   Psych: No active disease.   DVT ppx: Ambulation encouraged, SCDs when in bed, heparin ordered  Dispo: Continued in patient management

## 2024-02-10 NOTE — PROGRESS NOTE ADULT - SUBJECTIVE AND OBJECTIVE BOX
GYNECOLOGIC ONCOLOGY PROGRESS NOTE    PENELOPE FLAHERTY is a 58y now POD4 s/p ex-lap AZALIA BSO, cytoreductive surgery HIPEC, ostomy creation, c/b bladder perforation, repaired intraop. Received 2u pRBC intraop.    S:    Patient was seen and examined at bedside.  Patient reports her pain is improved from yesterday now moderate.  Pain in throat from NGT improving with lozenges.  NPO; denies nausea.  Desai in place; rust colored urine. small amount of clots noted in desai bag  She denies lightheadedness, dizziness, palpitations, chest pain and SOB.     OBJECTIVE:     VITALS:  Vital Signs Last 24 Hrs  T(C): 37.3 (10 Feb 2024 04:28), Max: 37.3 (10 Feb 2024 04:28)  T(F): 99.1 (10 Feb 2024 04:28), Max: 99.1 (10 Feb 2024 04:28)  HR: 102 (10 Feb 2024 04:28) (90 - 118)  BP: 151/75 (10 Feb 2024 04:28) (139/73 - 169/89)  BP(mean): --  RR: 18 (10 Feb 2024 04:28) (16 - 18)  SpO2: 92% (10 Feb 2024 04:28) (92% - 94%)    Parameters below as of 10 Feb 2024 04:28  Patient On (Oxygen Delivery Method): room air      I&O's Detail    07 Feb 2024 07:01  -  08 Feb 2024 07:00  --------------------------------------------------------  IN:    Lactated Ringers: 75 mL  Total IN: 75 mL    OUT:    Indwelling Catheter - Urethral (mL): 980 mL  Total OUT: 980 mL    Total NET: -905 mL      08 Feb 2024 07:01  -  09 Feb 2024 06:02  --------------------------------------------------------  IN:    Lactated Ringers: 1500 mL  Total IN: 1500 mL    OUT:    Colostomy (mL): 10 mL    Indwelling Catheter - Urethral (mL): 1760 mL    Nasogastric/Oral tube (mL): 1050 mL  Total OUT: 2820 mL    Total NET: -1320 mL          MEDICATIONS  (STANDING):  benzocaine/menthol Lozenge 1 Lozenge Oral two times a day  famotidine Injectable 20 milliGRAM(s) IV Push every 12 hours  heparin   Injectable 5000 Unit(s) SubCutaneous every 8 hours  influenza   Vaccine 0.5 milliLiter(s) IntraMuscular once  ketorolac   Injectable 15 milliGRAM(s) IV Push every 6 hours  lactated ringers. 1000 milliLiter(s) (75 mL/Hr) IV Continuous <Continuous>  metoprolol succinate ER 25 milliGRAM(s) Oral daily  valsartan 40 milliGRAM(s) Oral daily    MEDICATIONS  (PRN):  metoclopramide Injectable 10 milliGRAM(s) IV Push every 6 hours PRN Nausea and/or Vomiting  morphine  - Injectable 2 milliGRAM(s) IV Push every 4 hours PRN Severe Pain (7 - 10)  ondansetron Injectable 2 milliGRAM(s) IV Push every 6 hours PRN Nausea and/or Vomiting  oxyCODONE    IR 5 milliGRAM(s) Oral once PRN Moderate to Severe Pain (4-10)  simethicone 80 milliGRAM(s) Chew every 6 hours PRN Gas      Physical Exam:  HEENT: NGT in place; 400 cc dark output  Resp: breathing comfortably on RA; lungs CTAB  Abdomen: Soft, nondistended, mildly tender to palpation globally; +BS noted.   GI: ostomy, non productive; minimal serosanguineous fluid in bag; small amount of gas, no stool  : desai in place with dark rust colored urine, small amount of clots.  Pelvic: no active vaginal bleeding   Incision: prevena in place, to suction  Extremities: No calf tenderness or edema. SCDs in place

## 2024-02-11 LAB
ANION GAP SERPL CALC-SCNC: 14 MMOL/L — SIGNIFICANT CHANGE UP (ref 5–17)
BASOPHILS # BLD AUTO: 0.03 K/UL — SIGNIFICANT CHANGE UP (ref 0–0.2)
BASOPHILS NFR BLD AUTO: 0.3 % — SIGNIFICANT CHANGE UP (ref 0–2)
BLD GP AB SCN SERPL QL: SIGNIFICANT CHANGE UP
BUN SERPL-MCNC: 15.4 MG/DL — SIGNIFICANT CHANGE UP (ref 8–20)
CALCIUM SERPL-MCNC: 8.2 MG/DL — LOW (ref 8.4–10.5)
CHLORIDE SERPL-SCNC: 100 MMOL/L — SIGNIFICANT CHANGE UP (ref 96–108)
CO2 SERPL-SCNC: 24 MMOL/L — SIGNIFICANT CHANGE UP (ref 22–29)
CREAT SERPL-MCNC: 0.74 MG/DL — SIGNIFICANT CHANGE UP (ref 0.5–1.3)
EGFR: 95 ML/MIN/1.73M2 — SIGNIFICANT CHANGE UP
EOSINOPHIL # BLD AUTO: 0.18 K/UL — SIGNIFICANT CHANGE UP (ref 0–0.5)
EOSINOPHIL NFR BLD AUTO: 1.6 % — SIGNIFICANT CHANGE UP (ref 0–6)
GLUCOSE SERPL-MCNC: 110 MG/DL — HIGH (ref 70–99)
HCT VFR BLD CALC: 20.7 % — CRITICAL LOW (ref 34.5–45)
HGB BLD-MCNC: 6.8 G/DL — CRITICAL LOW (ref 11.5–15.5)
IMM GRANULOCYTES NFR BLD AUTO: 0.7 % — SIGNIFICANT CHANGE UP (ref 0–0.9)
LYMPHOCYTES # BLD AUTO: 0.75 K/UL — LOW (ref 1–3.3)
LYMPHOCYTES # BLD AUTO: 6.7 % — LOW (ref 13–44)
MAGNESIUM SERPL-MCNC: 2.1 MG/DL — SIGNIFICANT CHANGE UP (ref 1.6–2.6)
MCHC RBC-ENTMCNC: 30.6 PG — SIGNIFICANT CHANGE UP (ref 27–34)
MCHC RBC-ENTMCNC: 32.9 GM/DL — SIGNIFICANT CHANGE UP (ref 32–36)
MCV RBC AUTO: 93.2 FL — SIGNIFICANT CHANGE UP (ref 80–100)
MONOCYTES # BLD AUTO: 0.73 K/UL — SIGNIFICANT CHANGE UP (ref 0–0.9)
MONOCYTES NFR BLD AUTO: 6.5 % — SIGNIFICANT CHANGE UP (ref 2–14)
NEUTROPHILS # BLD AUTO: 9.41 K/UL — HIGH (ref 1.8–7.4)
NEUTROPHILS NFR BLD AUTO: 84.2 % — HIGH (ref 43–77)
PHOSPHATE SERPL-MCNC: 3.1 MG/DL — SIGNIFICANT CHANGE UP (ref 2.4–4.7)
PLATELET # BLD AUTO: 233 K/UL — SIGNIFICANT CHANGE UP (ref 150–400)
POTASSIUM SERPL-MCNC: 3.2 MMOL/L — LOW (ref 3.5–5.3)
POTASSIUM SERPL-SCNC: 3.2 MMOL/L — LOW (ref 3.5–5.3)
RBC # BLD: 2.22 M/UL — LOW (ref 3.8–5.2)
RBC # FLD: 18.4 % — HIGH (ref 10.3–14.5)
SODIUM SERPL-SCNC: 138 MMOL/L — SIGNIFICANT CHANGE UP (ref 135–145)
WBC # BLD: 11.18 K/UL — HIGH (ref 3.8–10.5)
WBC # FLD AUTO: 11.18 K/UL — HIGH (ref 3.8–10.5)

## 2024-02-11 PROCEDURE — 74019 RADEX ABDOMEN 2 VIEWS: CPT | Mod: 26

## 2024-02-11 RX ORDER — DIPHENHYDRAMINE HCL 50 MG
25 CAPSULE ORAL ONCE
Refills: 0 | Status: COMPLETED | OUTPATIENT
Start: 2024-02-11 | End: 2024-02-11

## 2024-02-11 RX ORDER — ACETAMINOPHEN 500 MG
1000 TABLET ORAL ONCE
Refills: 0 | Status: COMPLETED | OUTPATIENT
Start: 2024-02-11 | End: 2024-02-11

## 2024-02-11 RX ORDER — SODIUM CHLORIDE 9 MG/ML
700 INJECTION INTRAMUSCULAR; INTRAVENOUS; SUBCUTANEOUS ONCE
Refills: 0 | Status: COMPLETED | OUTPATIENT
Start: 2024-02-11 | End: 2024-02-11

## 2024-02-11 RX ORDER — POTASSIUM CHLORIDE 20 MEQ
10 PACKET (EA) ORAL
Refills: 0 | Status: COMPLETED | OUTPATIENT
Start: 2024-02-11 | End: 2024-02-11

## 2024-02-11 RX ORDER — PANTOPRAZOLE SODIUM 20 MG/1
40 TABLET, DELAYED RELEASE ORAL EVERY 12 HOURS
Refills: 0 | Status: DISCONTINUED | OUTPATIENT
Start: 2024-02-12 | End: 2024-02-21

## 2024-02-11 RX ADMIN — Medication 15 MILLIGRAM(S): at 13:00

## 2024-02-11 RX ADMIN — Medication 1 SPRAY(S): at 18:16

## 2024-02-11 RX ADMIN — Medication 1 SPRAY(S): at 12:13

## 2024-02-11 RX ADMIN — Medication 100 MILLIEQUIVALENT(S): at 16:37

## 2024-02-11 RX ADMIN — Medication 1000 MILLIGRAM(S): at 13:00

## 2024-02-11 RX ADMIN — PANTOPRAZOLE SODIUM 40 MILLIGRAM(S): 20 TABLET, DELAYED RELEASE ORAL at 12:08

## 2024-02-11 RX ADMIN — HEPARIN SODIUM 5000 UNIT(S): 5000 INJECTION INTRAVENOUS; SUBCUTANEOUS at 06:02

## 2024-02-11 RX ADMIN — Medication 15 MILLIGRAM(S): at 06:58

## 2024-02-11 RX ADMIN — Medication 15 MILLIGRAM(S): at 12:12

## 2024-02-11 RX ADMIN — SODIUM CHLORIDE 700 MILLILITER(S): 9 INJECTION INTRAMUSCULAR; INTRAVENOUS; SUBCUTANEOUS at 05:02

## 2024-02-11 RX ADMIN — FAMOTIDINE 20 MILLIGRAM(S): 10 INJECTION INTRAVENOUS at 18:15

## 2024-02-11 RX ADMIN — BENZOCAINE AND MENTHOL 1 LOZENGE: 5; 1 LIQUID ORAL at 18:15

## 2024-02-11 RX ADMIN — Medication 15 MILLIGRAM(S): at 19:00

## 2024-02-11 RX ADMIN — ONDANSETRON 2 MILLIGRAM(S): 8 TABLET, FILM COATED ORAL at 09:22

## 2024-02-11 RX ADMIN — FAMOTIDINE 20 MILLIGRAM(S): 10 INJECTION INTRAVENOUS at 06:03

## 2024-02-11 RX ADMIN — Medication 15 MILLIGRAM(S): at 18:15

## 2024-02-11 RX ADMIN — DEXTROSE MONOHYDRATE, SODIUM CHLORIDE, AND POTASSIUM CHLORIDE 75 MILLILITER(S): 50; .745; 4.5 INJECTION, SOLUTION INTRAVENOUS at 06:08

## 2024-02-11 RX ADMIN — Medication 1 SPRAY(S): at 06:04

## 2024-02-11 RX ADMIN — Medication 25 MILLIGRAM(S): at 12:11

## 2024-02-11 RX ADMIN — Medication 15 MILLIGRAM(S): at 06:03

## 2024-02-11 RX ADMIN — Medication 100 MILLIEQUIVALENT(S): at 15:13

## 2024-02-11 RX ADMIN — VALSARTAN 40 MILLIGRAM(S): 80 TABLET ORAL at 05:47

## 2024-02-11 RX ADMIN — Medication 15 MILLIGRAM(S): at 00:20

## 2024-02-11 RX ADMIN — Medication 100 MILLIEQUIVALENT(S): at 10:12

## 2024-02-11 RX ADMIN — Medication 400 MILLIGRAM(S): at 12:10

## 2024-02-11 RX ADMIN — BENZOCAINE AND MENTHOL 1 LOZENGE: 5; 1 LIQUID ORAL at 07:14

## 2024-02-11 RX ADMIN — Medication 1 SPRAY(S): at 00:34

## 2024-02-11 RX ADMIN — Medication 15 MILLIGRAM(S): at 01:20

## 2024-02-11 NOTE — PROGRESS NOTE ADULT - ASSESSMENT
PENELOPE FLAHERTY is a 58y now POD#4 s/p ex-lap AZALIA BSO, cytoreductive surgery, HIPEC, ostomy creation, c/b bladder perforation, repaired intraop. Received 2u pRBC intraop.    Neuro: Continue current pain regimen. Cepacol ordered for throat irritation  CV: Blood pressure moderately controlled. Home meds ordered.  Pulm: Saturating well on room air. Incentive spirometer use encouraged  GI: ostomy non productive. NGT in place w dark output. 200cc overnight NPO. On famotidine.  : Doe in place, s/p bladder repair intraop; to stay in for at least 7d; plan for CT urogram in patient if patient admitted 1 week post operatively. UOP adequate, however with persistent hematuria. Will consult urology; recommendations appreciated.   Heme: Acute blood loss anemia Hgb 9.7 ->8.54 > 7.8>7.6>7.7> 7.3>7.2>7.2>6.8 asymptomatic. Will transfuse 2U pRBCs.   ID: Afebrile. WBC 11.14> 10.37>16.61> 13.57>11.18. No antibiotics indicated at this time.   FEN: IVF 75cc/h. Resolved HAGMA. Lactate downtrended to 1.5.  Replete electrolytes prn.  Hypokalemia, K repleted,   Skin: No active disease.   Psych: No active disease.   DVT ppx: Ambulation encouraged, SCDs when in bed, heparin ordered  Dispo: Continued in patient management

## 2024-02-11 NOTE — CONSULT NOTE ADULT - NS ATTEND AMEND GEN_ALL_CORE FT
I have seen and examined the patient in PACU at 0257  Events noted.    Neuro: Awake no focal,   CV: HD normal, perfusion improved, LA 2.2  Pulm: SaO2 adequate  GI: Soft, tender at incision, provena on, stoma viable, edematous   : urine flow improve, hyperchloremic metabolic acidotics, hypomagnesemia  ID: sheba op abx  Heme: HJ/H lateral  Endo: glycemia at target    Plan:  AZALIA, BSO, Morfin's, bladder repair with cytoreductive surgery and HIPEC, metabolic acidosis with elevated lactate  Admit to SICU  [facility] I/O  Multimodality analgesia  Volume assessment via POCUS, bolus 500, change IVF yo Na Acetate 150Meq at 84 for 12 hrs for hyperchloremic metabolic  acidosis  sheba ip Abx  NPO, NGT  Will discuss with primary guzman chemoprophylaxes, sequential compression devises.
Agree with above  urine clearing  consider ct cystogram

## 2024-02-11 NOTE — PROVIDER CONTACT NOTE (CRITICAL VALUE NOTIFICATION) - SITUATION
lab called to report hemoglobin og 6.8 and hematocrit of 20.7. Dr Schuster at the bedside made aware

## 2024-02-11 NOTE — CONSULT NOTE ADULT - SUBJECTIVE AND OBJECTIVE BOX
HPI: POD#5 s/p ex-lap AZALIA BSO, cytoreductive surgery HIPEC, ostomy creation, c/b bladder perforation, repaired intraoperatively; received 2u pRBC intraoperatively.     Urology: called to see pt for hematuria. pt had repair of cystotomy intra-op by gyn team, has had hematuria over last seceral days.  Pt's desai clotted yesterday, had it replaced with another 16Fr desai.  RN states most of the desia lumen was filled with clots.  Desai has been draining well since yesterday, but still has hematuria.  Pt resting relatively comfortably, only had c/o bladder pain yesterday.  Urine draining is fruit punch in color.        PAST MEDICAL & SURGICAL HISTORY:  HTN (hypertension)      High cholesterol      H/O pleural effusion      History of thoracentesis          benzocaine 20% Spray 1 Spray(s) Topical every 6 hours  benzocaine/menthol Lozenge 1 Lozenge Oral two times a day  dextrose 5% + sodium chloride 0.45% with potassium chloride 40 mEq/L 1000 milliLiter(s) IV Continuous <Continuous>  famotidine Injectable 20 milliGRAM(s) IV Push every 12 hours  influenza   Vaccine 0.5 milliLiter(s) IntraMuscular once  ketorolac   Injectable 15 milliGRAM(s) IV Push every 6 hours  lactated ringers. 1000 milliLiter(s) IV Continuous <Continuous>  metoclopramide Injectable 10 milliGRAM(s) IV Push every 6 hours PRN  metoprolol succinate ER 25 milliGRAM(s) Oral daily  morphine  - Injectable 2 milliGRAM(s) IV Push every 4 hours PRN  ondansetron Injectable 2 milliGRAM(s) IV Push every 6 hours PRN  oxyCODONE    IR 5 milliGRAM(s) Oral once PRN  pantoprazole  Injectable 40 milliGRAM(s) IV Push daily  potassium chloride  10 mEq/100 mL IVPB 10 milliEquivalent(s) IV Intermittent every 1 hour  simethicone 80 milliGRAM(s) Chew every 6 hours PRN  valsartan 40 milliGRAM(s) Oral daily      paclitaxel (Flushing; Other (Severe))      T(C): 36.5 (02-11-24 @ 11:45), Max: 36.8 (02-11-24 @ 00:06)  HR: 91 (02-11-24 @ 11:45) (80 - 103)  BP: 157/81 (02-11-24 @ 11:45) (127/75 - 173/76)  RR: 19 (02-11-24 @ 11:45) (18 - 19)  SpO2: 94% (02-11-24 @ 11:45) (88% - 94%)      02-10-24 @ 07:01  -  02-11-24 @ 07:00  --------------------------------------------------------  IN: 2500 mL / OUT: 1750 mL / NET: 750 mL                              6.8    11.18 )-----------( 233      ( 11 Feb 2024 06:40 )             20.7       02-11    138  |  100  |  15.4  ----------------------------<  110<H>  3.2<L>   |  24.0  |  0.74    Ca    8.2<L>      11 Feb 2024 06:40  Phos  3.1     02-11  Mg     2.1     02-11    TPro  5.7<L>  /  Alb  2.6<L>  /  TBili  0.6  /  DBili  x   /  AST  16  /  ALT  20  /  AlkPhos  68  02-10      Urinalysis Basic - ( 11 Feb 2024 06:40 )    Color: x / Appearance: x / SG: x / pH: x  Gluc: 110 mg/dL / Ketone: x  / Bili: x / Urobili: x   Blood: x / Protein: x / Nitrite: x   Leuk Esterase: x / RBC: x / WBC x   Sq Epi: x / Non Sq Epi: x / Bacteria: x            Radiology:     HPI: POD#5 s/p ex-lap AZALIA BSO, cytoreductive surgery HIPEC, ostomy creation, c/b bladder perforation, repaired intraoperatively; received 2u pRBC intraoperatively.     Urology: called to see pt for hematuria. pt had repair of cystotomy intra-op by gyn team, has had hematuria over last seceral days.  Pt's desai clotted yesterday, had it replaced with another 16Fr desai.  RN states most of the desai lumen was filled with clots.  Desai has been draining well since yesterday, but still has hematuria.  Pt resting relatively comfortably, only had c/o bladder pain yesterday.  Urine draining is fruit punch in color.      Polish  #[phone]    PAST MEDICAL & SURGICAL HISTORY:  HTN (hypertension)      High cholesterol      H/O pleural effusion      History of thoracentesis          benzocaine 20% Spray 1 Spray(s) Topical every 6 hours  benzocaine/menthol Lozenge 1 Lozenge Oral two times a day  dextrose 5% + sodium chloride 0.45% with potassium chloride 40 mEq/L 1000 milliLiter(s) IV Continuous <Continuous>  famotidine Injectable 20 milliGRAM(s) IV Push every 12 hours  influenza   Vaccine 0.5 milliLiter(s) IntraMuscular once  ketorolac   Injectable 15 milliGRAM(s) IV Push every 6 hours  lactated ringers. 1000 milliLiter(s) IV Continuous <Continuous>  metoclopramide Injectable 10 milliGRAM(s) IV Push every 6 hours PRN  metoprolol succinate ER 25 milliGRAM(s) Oral daily  morphine  - Injectable 2 milliGRAM(s) IV Push every 4 hours PRN  ondansetron Injectable 2 milliGRAM(s) IV Push every 6 hours PRN  oxyCODONE    IR 5 milliGRAM(s) Oral once PRN  pantoprazole  Injectable 40 milliGRAM(s) IV Push daily  potassium chloride  10 mEq/100 mL IVPB 10 milliEquivalent(s) IV Intermittent every 1 hour  simethicone 80 milliGRAM(s) Chew every 6 hours PRN  valsartan 40 milliGRAM(s) Oral daily      paclitaxel (Flushing; Other (Severe))      T(C): 36.5 (02-11-24 @ 11:45), Max: 36.8 (02-11-24 @ 00:06)  HR: 91 (02-11-24 @ 11:45) (80 - 103)  BP: 157/81 (02-11-24 @ 11:45) (127/75 - 173/76)  RR: 19 (02-11-24 @ 11:45) (18 - 19)  SpO2: 94% (02-11-24 @ 11:45) (88% - 94%)      02-10-24 @ 07:01  -  02-11-24 @ 07:00  --------------------------------------------------------  IN: 2500 mL / OUT: 1750 mL / NET: 750 mL                              6.8    11.18 )-----------( 233      ( 11 Feb 2024 06:40 )             20.7       02-11    138  |  100  |  15.4  ----------------------------<  110<H>  3.2<L>   |  24.0  |  0.74    Ca    8.2<L>      11 Feb 2024 06:40  Phos  3.1     02-11  Mg     2.1     02-11    TPro  5.7<L>  /  Alb  2.6<L>  /  TBili  0.6  /  DBili  x   /  AST  16  /  ALT  20  /  AlkPhos  68  02-10      Urinalysis Basic - ( 11 Feb 2024 06:40 )    Color: x / Appearance: x / SG: x / pH: x  Gluc: 110 mg/dL / Ketone: x  / Bili: x / Urobili: x   Blood: x / Protein: x / Nitrite: x   Leuk Esterase: x / RBC: x / WBC x   Sq Epi: x / Non Sq Epi: x / Bacteria: x            Radiology:

## 2024-02-11 NOTE — CONSULT NOTE ADULT - ASSESSMENT
59 yo female POD#5 s/p ex-lap AZALIA BSO, cytoreductive surgery HIPEC, ostomy creation, c/b bladder perforation, repaired intraoperatively, with hematuria  - bladder gently irrigated, few small clots evacuated, now more pink in color  - monitor UO closely and color  - bladder scans to ensure bladder is emptying  - if pt develops any new clots or the catheter stops draining, would replace desai with at least at 20Fr desai  - desai will need to remain in place 2-3 weeks post-op for cystotomy repair  - will follow 57 yo female POD#5 s/p ex-lap AZALIA BSO, cytoreductive surgery HIPEC, ostomy creation, c/b bladder perforation, repaired intraoperatively, with hematuria  - bladder gently irrigated, few small clots evacuated, now more pink in color  - monitor UO closely and color  - trend h/h  - bladder scans to ensure bladder is emptying  - if pt develops any new clots or the catheter stops draining, would replace desai with at least at 20Fr desai  - desai will need to remain in place 2-3 weeks post-op for cystotomy repair  - if hematuria does not start to clear, may need CT cystogram tomorrow  - will follow

## 2024-02-11 NOTE — PROGRESS NOTE ADULT - SUBJECTIVE AND OBJECTIVE BOX
GYNECOLOGIC ONCOLOGY PROGRESS NOTE    PENELOPE FLAHERTY is a 58y now POD#5 s/p ex-lap AZALIA BSO, cytoreductive surgery HIPEC, ostomy creation, c/b bladder perforation, repaired intraoperatively; received 2u pRBC intraoperatively.     S:    Patient was seen and examined at bedside.  Patient denies complaints of abdominal pain at the moment.   Pain in throat from NGT improving with lozenges. She reports using the Hurricaine spray as well.   She reports nausea with position changes and the NGT movement.   NPO.   Desai in place; cranberry colored urine. small amount of clots noted in desai bag  She denies lightheadedness, dizziness, palpitations, chest pain and SOB.     OBJECTIVE:     VITALS:  Vital Signs Last 24 Hrs  T(C): 36.7 (2024 04:16), Max: 37.1 (10 Feb 2024 10:14)  T(F): 98 (2024 04:16), Max: 98.7 (10 Feb 2024 10:14)  HR: 86 (2024 05:45) (80 - 92)  BP: 173/76 (2024 05:45) (145/71 - 173/76)  RR: 18 (2024 04:16) (18 - 18)  SpO2: 94% (2024 04:16) (88% - 94%)    Parameters below as of 2024 04:16  Patient On (Oxygen Delivery Method): room air    I&O's Detail    10 Feb 2024 07:01  -  2024 07:00  --------------------------------------------------------  IN:    dextrose 5% + sodium chloride 0.45% w/ Additives: 1350 mL    Lactated Ringers: 450 mL    Sodium Chloride 0.9% Bolus: 700 mL  Total IN: 2500 mL    OUT:    Colostomy (mL): 150 mL    Indwelling Catheter - Urethral (mL): 1000 mL    Nasogastric/Oral tube (mL): 600 mL  Total OUT: 1750 mL    Total NET: 750 mL      MEDICATIONS  (STANDING):  benzocaine/menthol Lozenge 1 Lozenge Oral two times a day  famotidine Injectable 20 milliGRAM(s) IV Push every 12 hours  heparin   Injectable 5000 Unit(s) SubCutaneous every 8 hours  influenza   Vaccine 0.5 milliLiter(s) IntraMuscular once  ketorolac   Injectable 15 milliGRAM(s) IV Push every 6 hours  lactated ringers. 1000 milliLiter(s) (75 mL/Hr) IV Continuous <Continuous>  metoprolol succinate ER 25 milliGRAM(s) Oral daily  valsartan 40 milliGRAM(s) Oral daily    MEDICATIONS  (PRN):  metoclopramide Injectable 10 milliGRAM(s) IV Push every 6 hours PRN Nausea and/or Vomiting  morphine  - Injectable 2 milliGRAM(s) IV Push every 4 hours PRN Severe Pain (7 - 10)  ondansetron Injectable 2 milliGRAM(s) IV Push every 6 hours PRN Nausea and/or Vomiting  oxyCODONE    IR 5 milliGRAM(s) Oral once PRN Moderate to Severe Pain (4-10)  simethicone 80 milliGRAM(s) Chew every 6 hours PRN Gas      Physical Exam:  HEENT: NGT in place; 400 cc dark output  Resp: breathing comfortably on RA; lungs CTAB  Abdomen: Soft, nondistended, mildly tender to palpation globally; +BS noted.   GI: ostomy, non productive; minimal dark serosanguineous fluid in bag; small amount of gas, no stool  : desai in place with cranberry colored urine, small amount of clots.  Pelvic: no active vaginal bleeding   Incision: prevena in place, to suction  Extremities: No calf tenderness or edema. SCDs in place   Ncc dark brown fluid

## 2024-02-12 LAB
ALBUMIN SERPL ELPH-MCNC: 2.7 G/DL — LOW (ref 3.3–5.2)
ALP SERPL-CCNC: 94 U/L — SIGNIFICANT CHANGE UP (ref 40–120)
ALT FLD-CCNC: 12 U/L — SIGNIFICANT CHANGE UP
ANION GAP SERPL CALC-SCNC: 14 MMOL/L — SIGNIFICANT CHANGE UP (ref 5–17)
APTT BLD: 28.3 SEC — SIGNIFICANT CHANGE UP (ref 24.5–35.6)
AST SERPL-CCNC: 12 U/L — SIGNIFICANT CHANGE UP
BASOPHILS # BLD AUTO: 0.05 K/UL — SIGNIFICANT CHANGE UP (ref 0–0.2)
BASOPHILS NFR BLD AUTO: 0.4 % — SIGNIFICANT CHANGE UP (ref 0–2)
BILIRUB SERPL-MCNC: 0.7 MG/DL — SIGNIFICANT CHANGE UP (ref 0.4–2)
BUN SERPL-MCNC: 11.4 MG/DL — SIGNIFICANT CHANGE UP (ref 8–20)
CALCIUM SERPL-MCNC: 8.4 MG/DL — SIGNIFICANT CHANGE UP (ref 8.4–10.5)
CHLORIDE SERPL-SCNC: 101 MMOL/L — SIGNIFICANT CHANGE UP (ref 96–108)
CO2 SERPL-SCNC: 24 MMOL/L — SIGNIFICANT CHANGE UP (ref 22–29)
CREAT SERPL-MCNC: 0.76 MG/DL — SIGNIFICANT CHANGE UP (ref 0.5–1.3)
EGFR: 92 ML/MIN/1.73M2 — SIGNIFICANT CHANGE UP
EOSINOPHIL # BLD AUTO: 0.1 K/UL — SIGNIFICANT CHANGE UP (ref 0–0.5)
EOSINOPHIL NFR BLD AUTO: 0.8 % — SIGNIFICANT CHANGE UP (ref 0–6)
GLUCOSE SERPL-MCNC: 103 MG/DL — HIGH (ref 70–99)
HCT VFR BLD CALC: 28.5 % — LOW (ref 34.5–45)
HGB BLD-MCNC: 9.4 G/DL — LOW (ref 11.5–15.5)
IMM GRANULOCYTES NFR BLD AUTO: 1.4 % — HIGH (ref 0–0.9)
INR BLD: 1.18 RATIO — SIGNIFICANT CHANGE UP (ref 0.85–1.18)
LYMPHOCYTES # BLD AUTO: 1.78 K/UL — SIGNIFICANT CHANGE UP (ref 1–3.3)
LYMPHOCYTES # BLD AUTO: 14.4 % — SIGNIFICANT CHANGE UP (ref 13–44)
MAGNESIUM SERPL-MCNC: 1.8 MG/DL — SIGNIFICANT CHANGE UP (ref 1.6–2.6)
MCHC RBC-ENTMCNC: 29.7 PG — SIGNIFICANT CHANGE UP (ref 27–34)
MCHC RBC-ENTMCNC: 33 GM/DL — SIGNIFICANT CHANGE UP (ref 32–36)
MCV RBC AUTO: 90.2 FL — SIGNIFICANT CHANGE UP (ref 80–100)
MONOCYTES # BLD AUTO: 1.09 K/UL — HIGH (ref 0–0.9)
MONOCYTES NFR BLD AUTO: 8.8 % — SIGNIFICANT CHANGE UP (ref 2–14)
NEUTROPHILS # BLD AUTO: 9.2 K/UL — HIGH (ref 1.8–7.4)
NEUTROPHILS NFR BLD AUTO: 74.2 % — SIGNIFICANT CHANGE UP (ref 43–77)
PHOSPHATE SERPL-MCNC: 3.1 MG/DL — SIGNIFICANT CHANGE UP (ref 2.4–4.7)
PLATELET # BLD AUTO: 198 K/UL — SIGNIFICANT CHANGE UP (ref 150–400)
POTASSIUM SERPL-MCNC: 4 MMOL/L — SIGNIFICANT CHANGE UP (ref 3.5–5.3)
POTASSIUM SERPL-SCNC: 4 MMOL/L — SIGNIFICANT CHANGE UP (ref 3.5–5.3)
PROT SERPL-MCNC: 6.2 G/DL — LOW (ref 6.6–8.7)
PROTHROM AB SERPL-ACNC: 13 SEC — SIGNIFICANT CHANGE UP (ref 9.5–13)
RBC # BLD: 3.16 M/UL — LOW (ref 3.8–5.2)
RBC # FLD: 17.5 % — HIGH (ref 10.3–14.5)
SODIUM SERPL-SCNC: 139 MMOL/L — SIGNIFICANT CHANGE UP (ref 135–145)
WBC # BLD: 12.39 K/UL — HIGH (ref 3.8–10.5)
WBC # FLD AUTO: 12.39 K/UL — HIGH (ref 3.8–10.5)

## 2024-02-12 RX ORDER — SODIUM CHLORIDE 9 MG/ML
750 INJECTION, SOLUTION INTRAVENOUS ONCE
Refills: 0 | Status: COMPLETED | OUTPATIENT
Start: 2024-02-12 | End: 2024-02-12

## 2024-02-12 RX ADMIN — PANTOPRAZOLE SODIUM 40 MILLIGRAM(S): 20 TABLET, DELAYED RELEASE ORAL at 01:10

## 2024-02-12 RX ADMIN — Medication 15 MILLIGRAM(S): at 02:09

## 2024-02-12 RX ADMIN — SODIUM CHLORIDE 750 MILLILITER(S): 9 INJECTION, SOLUTION INTRAVENOUS at 08:09

## 2024-02-12 RX ADMIN — Medication 15 MILLIGRAM(S): at 11:12

## 2024-02-12 RX ADMIN — VALSARTAN 40 MILLIGRAM(S): 80 TABLET ORAL at 05:46

## 2024-02-12 RX ADMIN — FAMOTIDINE 20 MILLIGRAM(S): 10 INJECTION INTRAVENOUS at 16:52

## 2024-02-12 RX ADMIN — FAMOTIDINE 20 MILLIGRAM(S): 10 INJECTION INTRAVENOUS at 05:47

## 2024-02-12 RX ADMIN — BENZOCAINE AND MENTHOL 1 LOZENGE: 5; 1 LIQUID ORAL at 05:46

## 2024-02-12 RX ADMIN — PANTOPRAZOLE SODIUM 40 MILLIGRAM(S): 20 TABLET, DELAYED RELEASE ORAL at 11:12

## 2024-02-12 RX ADMIN — Medication 1 SPRAY(S): at 01:10

## 2024-02-12 RX ADMIN — Medication 1 SPRAY(S): at 16:53

## 2024-02-12 RX ADMIN — Medication 15 MILLIGRAM(S): at 12:12

## 2024-02-12 RX ADMIN — PANTOPRAZOLE SODIUM 40 MILLIGRAM(S): 20 TABLET, DELAYED RELEASE ORAL at 23:27

## 2024-02-12 RX ADMIN — Medication 10 MILLIGRAM(S): at 06:10

## 2024-02-12 RX ADMIN — Medication 15 MILLIGRAM(S): at 16:52

## 2024-02-12 RX ADMIN — ONDANSETRON 2 MILLIGRAM(S): 8 TABLET, FILM COATED ORAL at 01:10

## 2024-02-12 RX ADMIN — BENZOCAINE AND MENTHOL 1 LOZENGE: 5; 1 LIQUID ORAL at 16:48

## 2024-02-12 RX ADMIN — Medication 1 SPRAY(S): at 11:14

## 2024-02-12 RX ADMIN — Medication 15 MILLIGRAM(S): at 00:00

## 2024-02-12 RX ADMIN — Medication 15 MILLIGRAM(S): at 05:47

## 2024-02-12 RX ADMIN — Medication 15 MILLIGRAM(S): at 01:09

## 2024-02-12 NOTE — PROGRESS NOTE ADULT - SUBJECTIVE AND OBJECTIVE BOX
Subjective:58yFemale POD#6 s/p ex-lap AZALIA BSO, cytoreductive surgery HIPEC, ostomy creation, c/b bladder perforation, repaired intraoperatively, hematuria.  Pt feeling a little better this am.  Doe was flushed last night, draining well, yellow urine this am.      Doe: yellow    Vital Signs Last 24 Hrs  T(C): 36.3 (12 Feb 2024 08:38), Max: 37.1 (11 Feb 2024 14:40)  T(F): 97.4 (12 Feb 2024 08:38), Max: 98.8 (11 Feb 2024 14:40)  HR: 76 (12 Feb 2024 08:38) (76 - 109)  BP: 147/83 (12 Feb 2024 08:38) (138/84 - 161/78)  BP(mean): --  RR: 19 (12 Feb 2024 08:38) (18 - 19)  SpO2: 96% (12 Feb 2024 08:38) (91% - 96%)    Parameters below as of 12 Feb 2024 08:38  Patient On (Oxygen Delivery Method): room air      I&O's Detail    11 Feb 2024 07:01  -  12 Feb 2024 07:00  --------------------------------------------------------  IN:  Total IN: 0 mL    OUT:    Colostomy (mL): 0 mL    Indwelling Catheter - Urethral (mL): 1500 mL    Nasogastric/Oral tube (mL): 750 mL  Total OUT: 2250 mL    Total NET: -2250 mL          Labs:                        9.4    12.39 )-----------( 198      ( 12 Feb 2024 06:15 )             28.5     02-12    139  |  101  |  11.4  ----------------------------<  103<H>  4.0   |  24.0  |  0.76    Ca    8.4      12 Feb 2024 06:15  Phos  3.1     02-12  Mg     1.8     02-12    TPro  6.2<L>  /  Alb  2.7<L>  /  TBili  0.7  /  DBili  x   /  AST  12  /  ALT  12  /  AlkPhos  94  02-12    PT/INR - ( 12 Feb 2024 07:05 )   PT: 13.0 sec;   INR: 1.18 ratio         PTT - ( 12 Feb 2024 07:05 )  PTT:28.3 sec     Subjective:58yFemale POD#6 s/p ex-lap AZALIA BSO, cytoreductive surgery HIPEC, ostomy creation, c/b bladder perforation, repaired intraoperatively, hematuria.  Pt feeling a little better this am.  Doe was flushed last night, draining well, yellow urine this am.      Polish : [id number]    Doe: yellow    Vital Signs Last 24 Hrs  T(C): 36.3 (12 Feb 2024 08:38), Max: 37.1 (11 Feb 2024 14:40)  T(F): 97.4 (12 Feb 2024 08:38), Max: 98.8 (11 Feb 2024 14:40)  HR: 76 (12 Feb 2024 08:38) (76 - 109)  BP: 147/83 (12 Feb 2024 08:38) (138/84 - 161/78)  BP(mean): --  RR: 19 (12 Feb 2024 08:38) (18 - 19)  SpO2: 96% (12 Feb 2024 08:38) (91% - 96%)    Parameters below as of 12 Feb 2024 08:38  Patient On (Oxygen Delivery Method): room air      I&O's Detail    11 Feb 2024 07:01  -  12 Feb 2024 07:00  --------------------------------------------------------  IN:  Total IN: 0 mL    OUT:    Colostomy (mL): 0 mL    Indwelling Catheter - Urethral (mL): 1500 mL    Nasogastric/Oral tube (mL): 750 mL  Total OUT: 2250 mL    Total NET: -2250 mL          Labs:                        9.4    12.39 )-----------( 198      ( 12 Feb 2024 06:15 )             28.5     02-12    139  |  101  |  11.4  ----------------------------<  103<H>  4.0   |  24.0  |  0.76    Ca    8.4      12 Feb 2024 06:15  Phos  3.1     02-12  Mg     1.8     02-12    TPro  6.2<L>  /  Alb  2.7<L>  /  TBili  0.7  /  DBili  x   /  AST  12  /  ALT  12  /  AlkPhos  94  02-12    PT/INR - ( 12 Feb 2024 07:05 )   PT: 13.0 sec;   INR: 1.18 ratio         PTT - ( 12 Feb 2024 07:05 )  PTT:28.3 sec

## 2024-02-12 NOTE — PROGRESS NOTE ADULT - SUBJECTIVE AND OBJECTIVE BOX
GYNECOLOGIC ONCOLOGY PROGRESS NOTE    PENELOPE FLAHERTY is a 58y now POD#6 s/p ex-lap AZALIA BSO, cytoreductive surgery HIPEC, ostomy creation, c/b bladder perforation, repaired intraoperatively; received 2u pRBC intraoperatively.     S:    Patient was seen and examined at bedside.  Patient denies any pain currently.   Pain in throat from NGT improving with lozenges and Hurricaine spray.  She reports nausea without emesis.  Reports 2 episodes of rectal mucus discharge yesterday  NPO.   Desai in place; cranberry colored urine in bag; cleared up in the tubing.   She denies lightheadedness, dizziness, palpitations, chest pain and SOB.     OBJECTIVE:     VITALS:  T(F): 97.3 (24 @ 04:49), Max: 98.8 (24 @ 14:40)  HR: 80 (24 @ 04:49) (80 - 109)  BP: 147/83 (24 @ 04:49) (127/75 - 161/78)  RR: 18 (24 @ 04:49) (18 - 19)  SpO2: 95% (24 @ 04:49) (91% - 95%)  Wt(kg): --    I&O's Detail    10 Feb 2024 07:  -  2024 07:00  --------------------------------------------------------  IN:    dextrose 5% + sodium chloride 0.45% w/ Additives: 1350 mL    Lactated Ringers: 450 mL    Sodium Chloride 0.9% Bolus: 700 mL  Total IN: 2500 mL    OUT:    Colostomy (mL): 150 mL    Indwelling Catheter - Urethral (mL): 1000 mL    Nasogastric/Oral tube (mL): 600 mL  Total OUT: 1750 mL    Total NET: 750 mL      2024 07:01  -  2024 06:33  --------------------------------------------------------  IN:  Total IN: 0 mL    OUT:    Colostomy (mL): 0 mL    Indwelling Catheter - Urethral (mL): 1300 mL    Nasogastric/Oral tube (mL): 750 mL  Total OUT: 2050 mL    Total NET: -2050 mL          MEDICATIONS  (STANDING):  benzocaine 20% Spray 1 Spray(s) Topical every 6 hours  benzocaine/menthol Lozenge 1 Lozenge Oral two times a day  dextrose 5% + sodium chloride 0.45% with potassium chloride 40 mEq/L 1000 milliLiter(s) (75 mL/Hr) IV Continuous <Continuous>  famotidine Injectable 20 milliGRAM(s) IV Push every 12 hours  influenza   Vaccine 0.5 milliLiter(s) IntraMuscular once  ketorolac   Injectable 15 milliGRAM(s) IV Push every 6 hours  lactated ringers. 1000 milliLiter(s) (75 mL/Hr) IV Continuous <Continuous>  metoprolol succinate ER 25 milliGRAM(s) Oral daily  pantoprazole  Injectable 40 milliGRAM(s) IV Push every 12 hours  valsartan 40 milliGRAM(s) Oral daily    MEDICATIONS  (PRN):  metoclopramide Injectable 10 milliGRAM(s) IV Push every 6 hours PRN Nausea and/or Vomiting  morphine  - Injectable 2 milliGRAM(s) IV Push every 4 hours PRN Severe Pain (7 - 10)  ondansetron Injectable 2 milliGRAM(s) IV Push every 6 hours PRN Nausea and/or Vomiting  oxyCODONE    IR 5 milliGRAM(s) Oral once PRN Moderate to Severe Pain (4-10)  simethicone 80 milliGRAM(s) Chew every 6 hours PRN Gas      Physical Exam:  HEENT: NGT in place; 3000 cc dark output  Resp: breathing comfortably on RA; lungs CTAB  Abdomen: soft, mildly distended, mildly tender to palpation globally; + hypoactive BS noted.   GI: ostomy, non productive; minimal dark serosanguineous fluid in bag; small amount of gas, no stool  : desai in place with cranberry colored urine cleared up in tubing.  Pelvic: no active vaginal bleeding   Incision: prevena in place, to suction  Extremities: No calf tenderness or edema. SCDs in place   Ncc dark brown fluid      LABS:                        6.8    11.18 )-----------( 233      ( 2024 06:40 )             20.7     02-11    138  |  100  |  15.4  ----------------------------<  110<H>  3.2<L>   |  24.0  |  0.74    Ca    8.2<L>      2024 06:40  Phos  3.1     -  Mg     2.1     11        Urinalysis Basic - ( 2024 06:40 )    Color: x / Appearance: x / SG: x / pH: x  Gluc: 110 mg/dL / Ketone: x  / Bili: x / Urobili: x   Blood: x / Protein: x / Nitrite: x   Leuk Esterase: x / RBC: x / WBC x   Sq Epi: x / Non Sq Epi: x / Bacteria: x

## 2024-02-12 NOTE — PROGRESS NOTE ADULT - ASSESSMENT
57 yo female  POD#6 s/p ex-lap AZALIA BSO, cytoreductive surgery HIPEC, ostomy creation, c/b bladder perforation, repaired intraoperatively, hematuria, anemia  - hematuria resolved  - keep desai for total of 3 weeks, would recommend cystogram to ensure there is no leak prior to removing desai  - pt may f/u with gyn onc team or can see Dr. Layton in the office in 2 weeks  - cont care as per primary team

## 2024-02-12 NOTE — PROGRESS NOTE ADULT - ASSESSMENT
PENELOPE FLAHERTY is a 58y now POD#6 s/p ex-lap AZALIA BSO, cytoreductive surgery, HIPEC, ostomy creation, c/b bladder perforation, repaired intraop. Received 2u pRBC intraop.    Neuro: Continue current pain regimen. Cepacol and Hurricane spray ordered for throat irritation  CV: Blood pressure moderately controlled. Home meds ordered.  Pulm: Saturating well on room air. Incentive spirometer use encouraged. IS 1250  GI: ostomy non productive - minimal gas. NGT in place w dark output. 300cc overnight NPO. On famotidine.  : Desai in place, s/p bladder repair intraop; to stay in for at least 7d; plan for CT urogram in patient if patient admitted 1 week post operatively. UOP adequate, however with persistent hematuria. Urology consulted; recommend desai to stay in for 2-3 weeks Can consider higher Chinese desai if continues to clot. CT cystogram if urine does not clear up; Will follow up urology recommendations.    Heme: Acute blood loss anemia Hgb 9.7 ->8.54 > 7.8>7.6>7.7> 7.3>7.2>7.2>6.8 asymptomatic> 2u pRBCs > AM labs pending   ID: Afebrile. WBC 11.14> 10.37>16.61> 13.57>11.18>10.37. No antibiotics indicated at this time.   FEN: IVF 75cc/h. Resolved HAGMA. Lactate downtrended to 1.5.  Replete electrolytes prn.   Skin: No active disease.   Psych: No active disease.   DVT ppx: Ambulation encouraged, SCDs when in bed, heparin ordered  Dispo: Continued in patient management

## 2024-02-13 LAB
ALBUMIN SERPL ELPH-MCNC: 2.5 G/DL — LOW (ref 3.3–5.2)
ALP SERPL-CCNC: 108 U/L — SIGNIFICANT CHANGE UP (ref 40–120)
ALT FLD-CCNC: 9 U/L — SIGNIFICANT CHANGE UP
ANION GAP SERPL CALC-SCNC: 15 MMOL/L — SIGNIFICANT CHANGE UP (ref 5–17)
AST SERPL-CCNC: 12 U/L — SIGNIFICANT CHANGE UP
BASOPHILS # BLD AUTO: 0.05 K/UL — SIGNIFICANT CHANGE UP (ref 0–0.2)
BASOPHILS NFR BLD AUTO: 0.4 % — SIGNIFICANT CHANGE UP (ref 0–2)
BILIRUB SERPL-MCNC: 0.6 MG/DL — SIGNIFICANT CHANGE UP (ref 0.4–2)
BUN SERPL-MCNC: 9.1 MG/DL — SIGNIFICANT CHANGE UP (ref 8–20)
CALCIUM SERPL-MCNC: 8.6 MG/DL — SIGNIFICANT CHANGE UP (ref 8.4–10.5)
CHLORIDE SERPL-SCNC: 98 MMOL/L — SIGNIFICANT CHANGE UP (ref 96–108)
CO2 SERPL-SCNC: 21 MMOL/L — LOW (ref 22–29)
CREAT SERPL-MCNC: 0.77 MG/DL — SIGNIFICANT CHANGE UP (ref 0.5–1.3)
EGFR: 90 ML/MIN/1.73M2 — SIGNIFICANT CHANGE UP
EOSINOPHIL # BLD AUTO: 0.09 K/UL — SIGNIFICANT CHANGE UP (ref 0–0.5)
EOSINOPHIL NFR BLD AUTO: 0.8 % — SIGNIFICANT CHANGE UP (ref 0–6)
GLUCOSE SERPL-MCNC: 109 MG/DL — HIGH (ref 70–99)
HCT VFR BLD CALC: 29.8 % — LOW (ref 34.5–45)
HGB BLD-MCNC: 9.7 G/DL — LOW (ref 11.5–15.5)
IMM GRANULOCYTES NFR BLD AUTO: 0.9 % — SIGNIFICANT CHANGE UP (ref 0–0.9)
LYMPHOCYTES # BLD AUTO: 1.59 K/UL — SIGNIFICANT CHANGE UP (ref 1–3.3)
LYMPHOCYTES # BLD AUTO: 13.9 % — SIGNIFICANT CHANGE UP (ref 13–44)
MAGNESIUM SERPL-MCNC: 1.8 MG/DL — SIGNIFICANT CHANGE UP (ref 1.6–2.6)
MCHC RBC-ENTMCNC: 29.6 PG — SIGNIFICANT CHANGE UP (ref 27–34)
MCHC RBC-ENTMCNC: 32.6 GM/DL — SIGNIFICANT CHANGE UP (ref 32–36)
MCV RBC AUTO: 90.9 FL — SIGNIFICANT CHANGE UP (ref 80–100)
MONOCYTES # BLD AUTO: 1.13 K/UL — HIGH (ref 0–0.9)
MONOCYTES NFR BLD AUTO: 9.9 % — SIGNIFICANT CHANGE UP (ref 2–14)
NEUTROPHILS # BLD AUTO: 8.48 K/UL — HIGH (ref 1.8–7.4)
NEUTROPHILS NFR BLD AUTO: 74.1 % — SIGNIFICANT CHANGE UP (ref 43–77)
PHOSPHATE SERPL-MCNC: 3.7 MG/DL — SIGNIFICANT CHANGE UP (ref 2.4–4.7)
PLATELET # BLD AUTO: 216 K/UL — SIGNIFICANT CHANGE UP (ref 150–400)
POTASSIUM SERPL-MCNC: 4.1 MMOL/L — SIGNIFICANT CHANGE UP (ref 3.5–5.3)
POTASSIUM SERPL-SCNC: 4.1 MMOL/L — SIGNIFICANT CHANGE UP (ref 3.5–5.3)
PROT SERPL-MCNC: 6.4 G/DL — LOW (ref 6.6–8.7)
RBC # BLD: 3.28 M/UL — LOW (ref 3.8–5.2)
RBC # FLD: 17 % — HIGH (ref 10.3–14.5)
SODIUM SERPL-SCNC: 134 MMOL/L — LOW (ref 135–145)
WBC # BLD: 11.44 K/UL — HIGH (ref 3.8–10.5)
WBC # FLD AUTO: 11.44 K/UL — HIGH (ref 3.8–10.5)

## 2024-02-13 RX ORDER — MAGNESIUM SULFATE 500 MG/ML
2 VIAL (ML) INJECTION ONCE
Refills: 0 | Status: COMPLETED | OUTPATIENT
Start: 2024-02-13 | End: 2024-02-14

## 2024-02-13 RX ADMIN — BENZOCAINE AND MENTHOL 1 LOZENGE: 5; 1 LIQUID ORAL at 17:36

## 2024-02-13 RX ADMIN — DEXTROSE MONOHYDRATE, SODIUM CHLORIDE, AND POTASSIUM CHLORIDE 75 MILLILITER(S): 50; .745; 4.5 INJECTION, SOLUTION INTRAVENOUS at 02:59

## 2024-02-13 RX ADMIN — BENZOCAINE AND MENTHOL 1 LOZENGE: 5; 1 LIQUID ORAL at 05:19

## 2024-02-13 RX ADMIN — FAMOTIDINE 20 MILLIGRAM(S): 10 INJECTION INTRAVENOUS at 05:19

## 2024-02-13 RX ADMIN — PANTOPRAZOLE SODIUM 40 MILLIGRAM(S): 20 TABLET, DELAYED RELEASE ORAL at 12:34

## 2024-02-13 RX ADMIN — FAMOTIDINE 20 MILLIGRAM(S): 10 INJECTION INTRAVENOUS at 17:36

## 2024-02-13 RX ADMIN — Medication 25 MILLIGRAM(S): at 05:18

## 2024-02-13 NOTE — PHYSICAL THERAPY INITIAL EVALUATION ADULT - AMBULATION SKILLS, REHAB EVAL
Care plan reviewed with patient. Patient  verbalized understanding of the plan of care and contribute to goal setting. Problem: Discharge Planning  Goal: Knowledge of discharge instructions  Description  Knowledge of discharge instructions     Note:   Verbalized understanding of discharge instructions, follow ups and when to call doctor   Intervention: Discharge to appropriate level of care  Note:   Discuss discharge instructions, follow ups and when to call doctor. Problem: Infection - Central Venous Catheter-Associated Bloodstream Infection:  Goal: Will show no infection signs and symptoms  Description  Will show no infection signs and symptoms  Note:   Mediport site with no redness or warmth. Skin over port intact with no signs of breakdown noted. Patient verbalizes signs/symptoms of port infection and when to notify the physician. Intervention: Infection risk assessment  Description  Infection risk assessment  Note:   Discuss port maintenance, infection prevention, signs and when to call       Problem: Falls - Risk of:  Goal: Will remain free from falls  Description  Will remain free from falls  Note:   Free from falls while in infusion center. Verbalized understanding of fall prevention and to ask for assistance with ambulation   Intervention: Assess risk factors for falls  Description  Assess risk factors for falls  Note:   Free from falls while in infusion center.  Verbalized understanding of fall prevention and to ask for assistance with ambulation independent

## 2024-02-13 NOTE — PHYSICAL THERAPY INITIAL EVALUATION ADULT - PHYSICAL ASSIST/NONPHYSICAL ASSIST: SIT/STAND, REHAB EVAL
Pt and his spouse Tess called RN back. Pt is wearing ziopatch still. Planning on mailing back on 4/21. Reviewed the next appt is 4/18 w/ Lily Jacobo PA-C at 1350, [facility] labs @ 5156. Pt agreed.   Joe CARO     verbal cues

## 2024-02-13 NOTE — PROGRESS NOTE ADULT - SUBJECTIVE AND OBJECTIVE BOX
GYNECOLOGIC ONCOLOGY PROGRESS NOTE    PENELOPE FLAHERTY is a 58y now POD#7 s/p ex-lap AZALIA BSO, cytoreductive surgery HIPEC, ostomy creation, c/b bladder perforation, repaired intraoperatively; received 2u pRBC intraoperatively. Total 4u pRBCs during hospitalization.    S:    Patient was seen and examined at bedside.  Patient denies any pain currently.   NGT removed last night. Denies any nausea or vomiting. Reporting she feels hungry. Currently on CLD.  Desai in place; draining clear yellow urine.   Began ambulating yesterday.  She denies lightheadedness, dizziness, palpitations, chest pain and SOB.     OBJECTIVE:     VITALS:  T(F): 98.9 (02-13-24 @ 04:01), Max: 98.9 (02-13-24 @ 04:01)  HR: 89 (02-13-24 @ 04:01) (76 - 100)  BP: 138/78 (02-13-24 @ 04:01) (127/76 - 147/83)  RR: 18 (02-13-24 @ 04:01) (18 - 19)  SpO2: 95% (02-13-24 @ 04:01) (94% - 96%)  Wt(kg): --    I&O's Detail    12 Feb 2024 07:01  -  13 Feb 2024 07:00  --------------------------------------------------------  IN:    dextrose 5% + sodium chloride 0.45% w/ Additives: 1350 mL    Oral Fluid: 150 mL  Total IN: 1500 mL    OUT:    Indwelling Catheter - Urethral (mL): 900 mL    Nasogastric/Oral tube (mL): 20 mL  Total OUT: 920 mL    Total NET: 580 mL          MEDICATIONS  (STANDING):  benzocaine 20% Spray 1 Spray(s) Topical every 6 hours  benzocaine/menthol Lozenge 1 Lozenge Oral two times a day  dextrose 5% + sodium chloride 0.45% with potassium chloride 40 mEq/L 1000 milliLiter(s) (75 mL/Hr) IV Continuous <Continuous>  famotidine Injectable 20 milliGRAM(s) IV Push every 12 hours  influenza   Vaccine 0.5 milliLiter(s) IntraMuscular once  lactated ringers. 1000 milliLiter(s) (75 mL/Hr) IV Continuous <Continuous>  metoprolol succinate ER 25 milliGRAM(s) Oral daily  pantoprazole  Injectable 40 milliGRAM(s) IV Push every 12 hours  valsartan 40 milliGRAM(s) Oral daily    MEDICATIONS  (PRN):  metoclopramide Injectable 10 milliGRAM(s) IV Push every 6 hours PRN Nausea and/or Vomiting  morphine  - Injectable 2 milliGRAM(s) IV Push every 4 hours PRN Severe Pain (7 - 10)  ondansetron Injectable 2 milliGRAM(s) IV Push every 6 hours PRN Nausea and/or Vomiting  oxyCODONE    IR 5 milliGRAM(s) Oral once PRN Moderate to Severe Pain (4-10)  simethicone 80 milliGRAM(s) Chew every 6 hours PRN Gas      Physical Exam:  Resp: breathing comfortably on RA; lungs CTAB  Abdomen: soft, mildly distended, mildly tender near midline incision; + hypoactive BS noted.   GI: ostomy, non productive; condensation, minimal dark serosanguineous fluid in bag; small amount of gas, no stool  : desai in place draining clear yellow urine  Pelvic: no active vaginal bleeding   Incision: prevena in place, not to suction at this time; to be removed today  Extremities: No calf tenderness or edema. SCDs in place       LABS:                        9.7    11.44 )-----------( 216      ( 13 Feb 2024 06:17 )             29.8     02-12    139  |  101  |  11.4  ----------------------------<  103<H>  4.0   |  24.0  |  0.76    Ca    8.4      12 Feb 2024 06:15  Phos  3.1     02-12  Mg     1.8     02-12    TPro  6.2<L>  /  Alb  2.7<L>  /  TBili  0.7  /  DBili  x   /  AST  12  /  ALT  12  /  AlkPhos  94  02-12    PT/INR - ( 12 Feb 2024 07:05 )   PT: 13.0 sec;   INR: 1.18 ratio         PTT - ( 12 Feb 2024 07:05 )  PTT:28.3 sec  Urinalysis Basic - ( 12 Feb 2024 06:15 )    Color: x / Appearance: x / SG: x / pH: x  Gluc: 103 mg/dL / Ketone: x  / Bili: x / Urobili: x   Blood: x / Protein: x / Nitrite: x   Leuk Esterase: x / RBC: x / WBC x   Sq Epi: x / Non Sq Epi: x / Bacteria: x

## 2024-02-13 NOTE — PROGRESS NOTE ADULT - ASSESSMENT
PENELOPE FLAHERTY is a 58y now POD#7 s/p ex-lap AZALIA BSO, cytoreductive surgery, HIPEC, ostomy creation, c/b bladder perforation, repaired intraop. Received 2u pRBC intraop. Total 4u pRBCs.    Neuro: Continue current pain regimen. Cepacol and Hurricane spray ordered for throat irritation which has improved with removal of NGT.  CV: Blood pressure moderately controlled. Home meds ordered.  Pulm: Saturating well on room air. Incentive spirometer use encouraged.   GI: ostomy non productive - minimal gas. NGT removed after clamp trial. Tolerating CLD. Continue to monitor; advance diet as tolerated. Possibly to FLD this afternoon. On famotidine.  : Desai in place, s/p bladder repair intraop; to stay in for at least 2-3w; plan for CT urogram prior to removal. UOP adequate, hematuria imrproved. Urology consulted; recommend desai to stay in for 2-3 weeks Can consider higher Hungarian desai if continues to clot. CT cystogram if urine does not clear up; appreciate recs  Heme: Acute blood loss anemia Hgb 9.7 ->8.54 > 7.8>7.6>7.7> 7.3>7.2>7.2>6.8 asymptomatic> 2u pRBCs >9.4>9.7   ID: Afebrile. WBC 11.14> 10.37>16.61> 13.57>11.18>10.37>11.44 No antibiotics indicated at this time.   FEN: IVF 75cc/h. Resolved HAGMA. Lactate downtrended to 1.5.  Replete electrolytes prn.   Skin: No active disease.   Psych: No active disease.   DVT ppx: Increased Ambulation encouraged, SCDs when in bed, heparin ordered  Dispo: Continued in patient management

## 2024-02-13 NOTE — PHYSICAL THERAPY INITIAL EVALUATION ADULT - PERTINENT HX OF CURRENT PROBLEM, REHAB EVAL
as per medical chart:  Reports some intermittent left sided abdominal pain and bloating. She denies vaginal bleeding, discharge, PMB,  pelvis discomfort, changes in normal bowel/urinary habits, nausea, vomiting, or unintentional weight loss/gain. She is now scheduled for exploratory laparotomy abdominal hysterectomy, bilateral salpingo-oophorectomy, cytoreductive surgery, presenting to ED for PST.

## 2024-02-13 NOTE — PHYSICAL THERAPY INITIAL EVALUATION ADULT - ADDITIONAL COMMENTS
as per pt: resides in the private house 3 steps to enter, (-) DME, family available to assist as needed upon D/C home

## 2024-02-14 LAB
ANION GAP SERPL CALC-SCNC: 18 MMOL/L — HIGH (ref 5–17)
BASOPHILS # BLD AUTO: 0.05 K/UL — SIGNIFICANT CHANGE UP (ref 0–0.2)
BASOPHILS NFR BLD AUTO: 0.4 % — SIGNIFICANT CHANGE UP (ref 0–2)
BUN SERPL-MCNC: 14 MG/DL — SIGNIFICANT CHANGE UP (ref 8–20)
CALCIUM SERPL-MCNC: 8.8 MG/DL — SIGNIFICANT CHANGE UP (ref 8.4–10.5)
CHLORIDE SERPL-SCNC: 98 MMOL/L — SIGNIFICANT CHANGE UP (ref 96–108)
CO2 SERPL-SCNC: 19 MMOL/L — LOW (ref 22–29)
CREAT SERPL-MCNC: 0.89 MG/DL — SIGNIFICANT CHANGE UP (ref 0.5–1.3)
EGFR: 76 ML/MIN/1.73M2 — SIGNIFICANT CHANGE UP
EOSINOPHIL # BLD AUTO: 0.06 K/UL — SIGNIFICANT CHANGE UP (ref 0–0.5)
EOSINOPHIL NFR BLD AUTO: 0.5 % — SIGNIFICANT CHANGE UP (ref 0–6)
GLUCOSE SERPL-MCNC: 112 MG/DL — HIGH (ref 70–99)
HCT VFR BLD CALC: 33.5 % — LOW (ref 34.5–45)
HGB BLD-MCNC: 10.8 G/DL — LOW (ref 11.5–15.5)
IMM GRANULOCYTES NFR BLD AUTO: 0.6 % — SIGNIFICANT CHANGE UP (ref 0–0.9)
LYMPHOCYTES # BLD AUTO: 1.46 K/UL — SIGNIFICANT CHANGE UP (ref 1–3.3)
LYMPHOCYTES # BLD AUTO: 12.9 % — LOW (ref 13–44)
MAGNESIUM SERPL-MCNC: 1.8 MG/DL — SIGNIFICANT CHANGE UP (ref 1.6–2.6)
MCHC RBC-ENTMCNC: 29.4 PG — SIGNIFICANT CHANGE UP (ref 27–34)
MCHC RBC-ENTMCNC: 32.2 GM/DL — SIGNIFICANT CHANGE UP (ref 32–36)
MCV RBC AUTO: 91.3 FL — SIGNIFICANT CHANGE UP (ref 80–100)
MONOCYTES # BLD AUTO: 0.94 K/UL — HIGH (ref 0–0.9)
MONOCYTES NFR BLD AUTO: 8.3 % — SIGNIFICANT CHANGE UP (ref 2–14)
NEUTROPHILS # BLD AUTO: 8.71 K/UL — HIGH (ref 1.8–7.4)
NEUTROPHILS NFR BLD AUTO: 77.3 % — HIGH (ref 43–77)
PHOSPHATE SERPL-MCNC: 4.6 MG/DL — SIGNIFICANT CHANGE UP (ref 2.4–4.7)
PLATELET # BLD AUTO: 254 K/UL — SIGNIFICANT CHANGE UP (ref 150–400)
POTASSIUM SERPL-MCNC: 3.6 MMOL/L — SIGNIFICANT CHANGE UP (ref 3.5–5.3)
POTASSIUM SERPL-SCNC: 3.6 MMOL/L — SIGNIFICANT CHANGE UP (ref 3.5–5.3)
RBC # BLD: 3.67 M/UL — LOW (ref 3.8–5.2)
RBC # FLD: 17 % — HIGH (ref 10.3–14.5)
SODIUM SERPL-SCNC: 135 MMOL/L — SIGNIFICANT CHANGE UP (ref 135–145)
WBC # BLD: 11.29 K/UL — HIGH (ref 3.8–10.5)
WBC # FLD AUTO: 11.29 K/UL — HIGH (ref 3.8–10.5)

## 2024-02-14 PROCEDURE — 74018 RADEX ABDOMEN 1 VIEW: CPT | Mod: 26

## 2024-02-14 RX ORDER — KETOROLAC TROMETHAMINE 30 MG/ML
15 SYRINGE (ML) INJECTION ONCE
Refills: 0 | Status: DISCONTINUED | OUTPATIENT
Start: 2024-02-14 | End: 2024-02-14

## 2024-02-14 RX ORDER — SODIUM CHLORIDE 9 MG/ML
1000 INJECTION, SOLUTION INTRAVENOUS
Refills: 0 | Status: DISCONTINUED | OUTPATIENT
Start: 2024-02-14 | End: 2024-02-15

## 2024-02-14 RX ADMIN — Medication 25 GRAM(S): at 18:25

## 2024-02-14 RX ADMIN — PANTOPRAZOLE SODIUM 40 MILLIGRAM(S): 20 TABLET, DELAYED RELEASE ORAL at 12:24

## 2024-02-14 RX ADMIN — Medication 10 MILLIGRAM(S): at 08:02

## 2024-02-14 RX ADMIN — Medication 15 MILLIGRAM(S): at 10:22

## 2024-02-14 RX ADMIN — PANTOPRAZOLE SODIUM 40 MILLIGRAM(S): 20 TABLET, DELAYED RELEASE ORAL at 00:17

## 2024-02-14 RX ADMIN — Medication 15 MILLIGRAM(S): at 10:52

## 2024-02-14 RX ADMIN — Medication 25 MILLIGRAM(S): at 06:08

## 2024-02-14 RX ADMIN — FAMOTIDINE 20 MILLIGRAM(S): 10 INJECTION INTRAVENOUS at 06:07

## 2024-02-14 RX ADMIN — FAMOTIDINE 20 MILLIGRAM(S): 10 INJECTION INTRAVENOUS at 18:21

## 2024-02-14 RX ADMIN — ONDANSETRON 2 MILLIGRAM(S): 8 TABLET, FILM COATED ORAL at 12:24

## 2024-02-14 RX ADMIN — SIMETHICONE 80 MILLIGRAM(S): 80 TABLET, CHEWABLE ORAL at 06:17

## 2024-02-14 RX ADMIN — VALSARTAN 40 MILLIGRAM(S): 80 TABLET ORAL at 06:07

## 2024-02-14 RX ADMIN — ONDANSETRON 2 MILLIGRAM(S): 8 TABLET, FILM COATED ORAL at 18:25

## 2024-02-14 NOTE — PROGRESS NOTE ADULT - ASSESSMENT
PENELOPE FLAHERTY is a 58y now POD#8 s/p ex-lap AZALIA BSO, cytoreductive surgery, HIPEC, ostomy creation, c/b bladder perforation, repaired intraop. Received 2u pRBC intraop. Total 4u pRBCs.    Neuro: Continue current pain regimen. Cepacol and Hurricane spray ordered for throat irritation which has improved with removal of NGT.  CV: Blood pressure moderately controlled. Home meds ordered.  Pulm: Saturating well on room air. Incentive spirometer use encouraged.   GI: ostomy non productive - minimal gas. NGT removed after clamp trial. Tolerating CLD. Continue to monitor; advance diet as tolerated. Possibly to FLD this afternoon. On famotidine.  : Desai in place, s/p bladder repair intraop; to stay in for at least 2-3w; plan for CT urogram prior to removal. UOP adequate, hematuria imrproved. Urology consulted; recommend desai to stay in for 2-3 weeks Can consider higher Tajik desai if continues to clot. CT cystogram if urine does not clear up; appreciate recs  Heme: Acute blood loss anemia Hgb 9.7 ->8.54 > 7.8>7.6>7.7> 7.3>7.2>7.2>6.8 asymptomatic> 2u pRBCs >9.4>9.7 >10.8  ID: Afebrile. WBC 11.14> 10.37>16.61> 13.57>11.18>10.37>11.44 > 11.29 No antibiotics indicated at this time.   FEN: IVF 75cc/h. Resolved HAGMA. Lactate downtrended to 1.5.  Replete electrolytes prn.   Skin: No active disease.   Psych: No active disease.   DVT ppx: Increased Ambulation encouraged, SCDs when in bed, heparin held  Dispo: Continued in patient management

## 2024-02-14 NOTE — DIETITIAN INITIAL EVALUATION ADULT - ETIOLOGY
related to inability to meet sufficient protein-energy in setting of ovarian cancer s/p AZALIA-BSO, HIPEC, ostomy creation, c/b bladder perforation

## 2024-02-14 NOTE — DIETITIAN INITIAL EVALUATION ADULT - ADD RECOMMEND
Continue Zofran and Reglan PRN for nausea. Encourage po intake, monitor diet tolerance, and provide assistance at meals as needed. Obtain daily weights to monitor trends.

## 2024-02-14 NOTE — PROGRESS NOTE ADULT - SUBJECTIVE AND OBJECTIVE BOX
GYNECOLOGIC ONCOLOGY PROGRESS NOTE    PENELOPE FLAHERTY is a 58y now POD#8 s/p ex-lap AZALIA BSO, cytoreductive surgery HIPEC, ostomy creation, c/b bladder perforation, repaired intraoperatively; received 2u pRBC intraoperatively. Total 4u pRBCs during hospitalization.    S:    Patient was seen and examined at bedside.  Patient reports minimal discomfort from bloating, not painful  NGT removed 2/12, tolerating sips of water with nausea, no vomiting. Reporting she feels hungry. Currently on CLD.  Desai in place; draining clear yellow urine.   Began ambulating yesterday.  She denies lightheadedness, dizziness, palpitations, chest pain and SOB.     OBJECTIVE:     Vital Signs Last 24 Hrs  T(C): 36.8 (14 Feb 2024 05:02), Max: 37.1 (14 Feb 2024 00:22)  T(F): 98.2 (14 Feb 2024 05:02), Max: 98.7 (14 Feb 2024 00:22)  HR: 92 (14 Feb 2024 05:02) (83 - 94)  BP: 126/84 (14 Feb 2024 05:02) (126/84 - 154/90)  RR: 18 (14 Feb 2024 05:02) (18 - 18)  SpO2: 99% (14 Feb 2024 05:02) (94% - 99%)    Parameters below as of 14 Feb 2024 05:02  Patient On (Oxygen Delivery Method): room air      I&O's Detail    12 Feb 2024 07:01  -  13 Feb 2024 07:00  --------------------------------------------------------  IN:    dextrose 5% + sodium chloride 0.45% w/ Additives: 1350 mL    Oral Fluid: 150 mL  Total IN: 1500 mL    OUT:    Indwelling Catheter - Urethral (mL): 900 mL    Nasogastric/Oral tube (mL): 20 mL  Total OUT: 920 mL    Total NET: 580 mL          MEDICATIONS  (STANDING):  benzocaine 20% Spray 1 Spray(s) Topical every 6 hours  benzocaine/menthol Lozenge 1 Lozenge Oral two times a day  dextrose 5% + sodium chloride 0.45% with potassium chloride 40 mEq/L 1000 milliLiter(s) (75 mL/Hr) IV Continuous <Continuous>  famotidine Injectable 20 milliGRAM(s) IV Push every 12 hours  influenza   Vaccine 0.5 milliLiter(s) IntraMuscular once  lactated ringers. 1000 milliLiter(s) (75 mL/Hr) IV Continuous <Continuous>  metoprolol succinate ER 25 milliGRAM(s) Oral daily  pantoprazole  Injectable 40 milliGRAM(s) IV Push every 12 hours  valsartan 40 milliGRAM(s) Oral daily    MEDICATIONS  (PRN):  metoclopramide Injectable 10 milliGRAM(s) IV Push every 6 hours PRN Nausea and/or Vomiting  morphine  - Injectable 2 milliGRAM(s) IV Push every 4 hours PRN Severe Pain (7 - 10)  ondansetron Injectable 2 milliGRAM(s) IV Push every 6 hours PRN Nausea and/or Vomiting  oxyCODONE    IR 5 milliGRAM(s) Oral once PRN Moderate to Severe Pain (4-10)  simethicone 80 milliGRAM(s) Chew every 6 hours PRN Gas      Physical Exam:  Resp: breathing comfortably on RA; lungs CTAB  Abdomen: soft, mildly distended, mildly tender near midline incision; + hypoactive BS noted.   GI: ostomy, non productive; condensation, minimal dark serosanguineous fluid in bag; small amount of gas  : desai in place draining clear yellow urine  Pelvic: no active vaginal bleeding   Incision: prevena in place, not to suction at this time; to be removed today  Extremities: No calf tenderness or edema. SCDs in place       LABS:                        10.8   11.29 )-----------( 254      ( 14 Feb 2024 06:04 )             33.5                           9.7    11.44 )-----------( 216      ( 13 Feb 2024 06:17 )             29.8     02-12    139  |  101  |  11.4  ----------------------------<  103<H>  4.0   |  24.0  |  0.76    Ca    8.4      12 Feb 2024 06:15  Phos  3.1     02-12  Mg     1.8     02-12    TPro  6.2<L>  /  Alb  2.7<L>  /  TBili  0.7  /  DBili  x   /  AST  12  /  ALT  12  /  AlkPhos  94  02-12    PT/INR - ( 12 Feb 2024 07:05 )   PT: 13.0 sec;   INR: 1.18 ratio         PTT - ( 12 Feb 2024 07:05 )  PTT:28.3 sec  Urinalysis Basic - ( 12 Feb 2024 06:15 )    Color: x / Appearance: x / SG: x / pH: x  Gluc: 103 mg/dL / Ketone: x  / Bili: x / Urobili: x   Blood: x / Protein: x / Nitrite: x   Leuk Esterase: x / RBC: x / WBC x   Sq Epi: x / Non Sq Epi: x / Bacteria: x

## 2024-02-14 NOTE — DIETITIAN INITIAL EVALUATION ADULT - ORAL INTAKE PTA/DIET HISTORY
Nutrition assessment completed. Spoke with pt via polish  Pio ID #[id number]. Pt currently on clear liquid diet. Reports nausea/vomiting this morning after taking a few sips of Ensure Clear supplement. Pt reports she was following a regular diet at home with PO intake varying depending on the day. Pt reports she had weight loss of ~15 lbs last year but weight has recently been stable. Pt receiving Zofran and Reglan nausea. RD to follow up as feasible.  Nutrition assessment completed. Spoke with pt via polish  Pio ID #[id number]. Pt currently on clear liquid diet. Reports nausea/vomiting this morning after taking a few sips of Ensure Clear supplement. Pt reports she was following a regular diet at home with PO intake varying depending on the day. Pt reports she had weight loss of ~15 lbs last year but weight has recently been stable. Pt receiving Zofran and Reglan for nausea. RD to follow up as feasible.

## 2024-02-14 NOTE — DIETITIAN INITIAL EVALUATION ADULT - PERTINENT LABORATORY DATA
02-14    135  |  98  |  14.0  ----------------------------<  112<H>  3.6   |  19.0<L>  |  0.89    Ca    8.8      14 Feb 2024 06:04  Phos  4.6     02-14  Mg     1.8     02-14    TPro  6.4<L>  /  Alb  2.5<L>  /  TBili  0.6  /  DBili  x   /  AST  12  /  ALT  9   /  AlkPhos  108  02-13  A1C with Estimated Average Glucose Result: 5.2 % (01-22-24 @ 11:35)

## 2024-02-14 NOTE — DIETITIAN INITIAL EVALUATION ADULT - PERTINENT MEDS FT
MEDICATIONS  (STANDING):  famotidine Injectable 20 milliGRAM(s) IV Push every 12 hours  magnesium sulfate  IVPB 2 Gram(s) IV Intermittent once  metoprolol succinate ER 25 milliGRAM(s) Oral daily  pantoprazole  Injectable 40 milliGRAM(s) IV Push every 12 hours  valsartan 40 milliGRAM(s) Oral daily    MEDICATIONS  (PRN):  metoclopramide Injectable 10 milliGRAM(s) IV Push every 6 hours PRN Nausea and/or Vomiting  ondansetron Injectable 2 milliGRAM(s) IV Push every 6 hours PRN Nausea and/or Vomiting  simethicone 80 milliGRAM(s) Chew every 6 hours PRN Gas

## 2024-02-14 NOTE — DIETITIAN INITIAL EVALUATION ADULT - OTHER INFO
58y s/p ex-lap AZALIA BSO, cytoreductive surgery, HIPEC, ostomy creation, c/b bladder perforation, repaired intraop. Received 2u pRBC intraop. Total 4u pRBCs.

## 2024-02-14 NOTE — DIETITIAN INITIAL EVALUATION ADULT - NSFNSGIIOFT_GEN_A_CORE
02-13-24 @ 07:01  -  02-14-24 @ 07:00  --------------------------------------------------------  OUT:    Colostomy (mL): 700 mL  Total OUT: 700 mL    Total NET: -700 mL

## 2024-02-14 NOTE — DIETITIAN NUTRITION RISK NOTIFICATION - ADDITIONAL COMMENTS/DIETITIAN RECOMMENDATIONS
Advance diet as medically feasible/tolerable.  Continue Zofran and Reglan PRN for nausea. Encourage po intake, monitor diet tolerance, and provide assistance at meals as needed. Obtain daily weights to monitor trends.

## 2024-02-15 ENCOUNTER — OUTPATIENT (OUTPATIENT)
Dept: OUTPATIENT SERVICES | Facility: HOSPITAL | Age: 59
LOS: 1 days | End: 2024-02-15

## 2024-02-15 DIAGNOSIS — C56.9 MALIGNANT NEOPLASM OF UNSPECIFIED OVARY: ICD-10-CM

## 2024-02-15 DIAGNOSIS — R11.2 NAUSEA WITH VOMITING, UNSPECIFIED: ICD-10-CM

## 2024-02-15 DIAGNOSIS — Z51.11 ENCOUNTER FOR ANTINEOPLASTIC CHEMOTHERAPY: ICD-10-CM

## 2024-02-15 DIAGNOSIS — Z98.890 OTHER SPECIFIED POSTPROCEDURAL STATES: Chronic | ICD-10-CM

## 2024-02-15 LAB
ANION GAP SERPL CALC-SCNC: 15 MMOL/L — SIGNIFICANT CHANGE UP (ref 5–17)
BASOPHILS # BLD AUTO: 0.04 K/UL — SIGNIFICANT CHANGE UP (ref 0–0.2)
BASOPHILS NFR BLD AUTO: 0.5 % — SIGNIFICANT CHANGE UP (ref 0–2)
BUN SERPL-MCNC: 31.2 MG/DL — HIGH (ref 8–20)
CALCIUM SERPL-MCNC: 8.7 MG/DL — SIGNIFICANT CHANGE UP (ref 8.4–10.5)
CHLORIDE SERPL-SCNC: 102 MMOL/L — SIGNIFICANT CHANGE UP (ref 96–108)
CO2 SERPL-SCNC: 21 MMOL/L — LOW (ref 22–29)
CREAT SERPL-MCNC: 1.22 MG/DL — SIGNIFICANT CHANGE UP (ref 0.5–1.3)
EGFR: 52 ML/MIN/1.73M2 — LOW
EOSINOPHIL # BLD AUTO: 0.07 K/UL — SIGNIFICANT CHANGE UP (ref 0–0.5)
EOSINOPHIL NFR BLD AUTO: 0.9 % — SIGNIFICANT CHANGE UP (ref 0–6)
GLUCOSE BLDC GLUCOMTR-MCNC: 97 MG/DL — SIGNIFICANT CHANGE UP (ref 70–99)
GLUCOSE SERPL-MCNC: 90 MG/DL — SIGNIFICANT CHANGE UP (ref 70–99)
HCT VFR BLD CALC: 31.3 % — LOW (ref 34.5–45)
HGB BLD-MCNC: 9.8 G/DL — LOW (ref 11.5–15.5)
IMM GRANULOCYTES NFR BLD AUTO: 0.4 % — SIGNIFICANT CHANGE UP (ref 0–0.9)
LYMPHOCYTES # BLD AUTO: 1.44 K/UL — SIGNIFICANT CHANGE UP (ref 1–3.3)
LYMPHOCYTES # BLD AUTO: 18.8 % — SIGNIFICANT CHANGE UP (ref 13–44)
MAGNESIUM SERPL-MCNC: 2.7 MG/DL — HIGH (ref 1.8–2.6)
MCHC RBC-ENTMCNC: 29.7 PG — SIGNIFICANT CHANGE UP (ref 27–34)
MCHC RBC-ENTMCNC: 31.3 GM/DL — LOW (ref 32–36)
MCV RBC AUTO: 94.8 FL — SIGNIFICANT CHANGE UP (ref 80–100)
MONOCYTES # BLD AUTO: 0.98 K/UL — HIGH (ref 0–0.9)
MONOCYTES NFR BLD AUTO: 12.8 % — SIGNIFICANT CHANGE UP (ref 2–14)
NEUTROPHILS # BLD AUTO: 5.12 K/UL — SIGNIFICANT CHANGE UP (ref 1.8–7.4)
NEUTROPHILS NFR BLD AUTO: 66.6 % — SIGNIFICANT CHANGE UP (ref 43–77)
PHOSPHATE SERPL-MCNC: 4.8 MG/DL — HIGH (ref 2.4–4.7)
PLATELET # BLD AUTO: 253 K/UL — SIGNIFICANT CHANGE UP (ref 150–400)
POTASSIUM SERPL-MCNC: 4.3 MMOL/L — SIGNIFICANT CHANGE UP (ref 3.5–5.3)
POTASSIUM SERPL-SCNC: 4.3 MMOL/L — SIGNIFICANT CHANGE UP (ref 3.5–5.3)
RBC # BLD: 3.3 M/UL — LOW (ref 3.8–5.2)
RBC # FLD: 17.2 % — HIGH (ref 10.3–14.5)
SODIUM SERPL-SCNC: 138 MMOL/L — SIGNIFICANT CHANGE UP (ref 135–145)
WBC # BLD: 7.68 K/UL — SIGNIFICANT CHANGE UP (ref 3.8–10.5)
WBC # FLD AUTO: 7.68 K/UL — SIGNIFICANT CHANGE UP (ref 3.8–10.5)

## 2024-02-15 RX ORDER — IBUPROFEN 200 MG
600 TABLET ORAL EVERY 6 HOURS
Refills: 0 | Status: DISCONTINUED | OUTPATIENT
Start: 2024-02-15 | End: 2024-02-21

## 2024-02-15 RX ORDER — ACETAMINOPHEN 500 MG
975 TABLET ORAL
Refills: 0 | Status: DISCONTINUED | OUTPATIENT
Start: 2024-02-15 | End: 2024-02-19

## 2024-02-15 RX ORDER — ENOXAPARIN SODIUM 100 MG/ML
40 INJECTION SUBCUTANEOUS EVERY 24 HOURS
Refills: 0 | Status: DISCONTINUED | OUTPATIENT
Start: 2024-02-15 | End: 2024-02-20

## 2024-02-15 RX ADMIN — ONDANSETRON 2 MILLIGRAM(S): 8 TABLET, FILM COATED ORAL at 22:49

## 2024-02-15 RX ADMIN — PANTOPRAZOLE SODIUM 40 MILLIGRAM(S): 20 TABLET, DELAYED RELEASE ORAL at 00:30

## 2024-02-15 RX ADMIN — ONDANSETRON 2 MILLIGRAM(S): 8 TABLET, FILM COATED ORAL at 08:11

## 2024-02-15 RX ADMIN — ENOXAPARIN SODIUM 40 MILLIGRAM(S): 100 INJECTION SUBCUTANEOUS at 17:55

## 2024-02-15 RX ADMIN — PANTOPRAZOLE SODIUM 40 MILLIGRAM(S): 20 TABLET, DELAYED RELEASE ORAL at 12:31

## 2024-02-15 RX ADMIN — FAMOTIDINE 20 MILLIGRAM(S): 10 INJECTION INTRAVENOUS at 17:55

## 2024-02-15 RX ADMIN — FAMOTIDINE 20 MILLIGRAM(S): 10 INJECTION INTRAVENOUS at 05:56

## 2024-02-15 NOTE — PROGRESS NOTE ADULT - ASSESSMENT
PENELOPE FLAHERTY is a 58y now POD#9 s/p ex-lap AZALIA BSO, cytoreductive surgery, HIPEC, ostomy creation, c/b bladder perforation, repaired intraop. Received 2u pRBC intraop. Total 4u pRBCs.    Neuro: Well controlled. Continue current pain regimen.   CV: Blood pressure moderately controlled. Home meds ordered. Currently held due to NPO status; however, VSS  Pulm: Saturating well on room air. Incentive spirometer use encouraged.   GI: ostomy productive; 500cc output overnight. NPO due to emesis yesterday; no n/v overnight. Consider transition to CLD today.  On famotidine.  : Desai in place, s/p bladder repair intraop; to stay in for at least 2-3w; plan for CT urogram prior to removal. UOP adequate, hematuria imrproved. Urology consulted; recommend desai to stay in for 2-3 weeks Can consider higher Hebrew desai if continues to clot. CT cystogram if urine does not clear up; appreciate recs  Heme: Acute blood loss anemia Hgb 9.7 ->8.54 > 7.8>7.6>7.7> 7.3>7.2>7.2>6.8 asymptomatic> 2u pRBCs >9.4>9.7 >10.8> AM labs pending  ID: Afebrile. WBC 11.14> 10.37>16.61> 13.57>11.18>10.37>11.44 > 11.29> AM labs pending No antibiotics indicated at this time.   FEN: IVF 75cc/h. Resolved HAGMA. Lactate downtrended to 1.5.  Replete electrolytes prn.   Skin: No active disease.   Psych: No active disease.   DVT ppx: Increased Ambulation encouraged, SCDs when in bed, heparin held  Dispo: Continued in patient management

## 2024-02-15 NOTE — PROGRESS NOTE ADULT - SUBJECTIVE AND OBJECTIVE BOX
GYNECOLOGIC ONCOLOGY PROGRESS NOTE    PENELOPE FLAHERTY is a 58y now POD# s/p ex-lap AZALIA BSO, cytoreductive surgery HIPEC, ostomy creation, c/b bladder perforation, repaired intraoperatively; received 2u pRBC intraoperatively. Total 4u pRBCs during hospitalization.    S:    Patient was seen and examined at bedside.  Patient denies any pain.  Was transitioned to NPO from CLD after emesis yesterday. Denies any nausea or emesis overnight.   Desai in place; draining clear yellow urine.   Continues to ambulate  She denies lightheadedness, dizziness, palpitations, chest pain and SOB.   OBJECTIVE:     VITALS:  T(F): 97.9 (02-15-24 @ 04:08), Max: 97.9 (02-15-24 @ 04:08)  HR: 87 (02-15-24 @ 04:08) (87 - 112)  BP: 114/71 (02-15-24 @ 04:08) (94/67 - 120/79)  RR: 18 (02-15-24 @ 04:08) (18 - 20)  SpO2: 97% (02-15-24 @ 04:08) (95% - 97%)  Wt(kg): --    I&O's Detail    14 Feb 2024 07:01  -  15 Feb 2024 07:00  --------------------------------------------------------  IN:  Total IN: 0 mL    OUT:    Colostomy (mL): 1150 mL    Emesis (mL): 700 mL  Total OUT: 1850 mL    Total NET: -1850 mL          MEDICATIONS  (STANDING):  benzocaine 20% Spray 1 Spray(s) Topical every 6 hours  benzocaine/menthol Lozenge 1 Lozenge Oral two times a day  famotidine Injectable 20 milliGRAM(s) IV Push every 12 hours  influenza   Vaccine 0.5 milliLiter(s) IntraMuscular once  lactated ringers. 1000 milliLiter(s) (75 mL/Hr) IV Continuous <Continuous>  metoprolol succinate ER 25 milliGRAM(s) Oral daily  pantoprazole  Injectable 40 milliGRAM(s) IV Push every 12 hours  valsartan 40 milliGRAM(s) Oral daily    MEDICATIONS  (PRN):  metoclopramide Injectable 10 milliGRAM(s) IV Push every 6 hours PRN Nausea and/or Vomiting  ondansetron Injectable 2 milliGRAM(s) IV Push every 6 hours PRN Nausea and/or Vomiting  simethicone 80 milliGRAM(s) Chew every 6 hours PRN Gas      Physical Exam:  Resp: breathing comfortably on RA; lungs CTAB  Abdomen: soft, mildly distended, nontender; + BS noted.   GI: ostomy, productive; was recently emptied so minimal output; 500cc overnight.  : desai in place draining clear yellow urine  Pelvic: no active vaginal bleeding   Incision:C/D/I with staples in place  Extremities: No calf tenderness or edema. SCDs in place       LABS:                        10.8   11.29 )-----------( 254      ( 14 Feb 2024 06:04 )             33.5     02-14    135  |  98  |  14.0  ----------------------------<  112<H>  3.6   |  19.0<L>  |  0.89    Ca    8.8      14 Feb 2024 06:04  Phos  4.6     02-14  Mg     1.8     02-14        Urinalysis Basic - ( 14 Feb 2024 06:04 )    Color: x / Appearance: x / SG: x / pH: x  Gluc: 112 mg/dL / Ketone: x  / Bili: x / Urobili: x   Blood: x / Protein: x / Nitrite: x   Leuk Esterase: x / RBC: x / WBC x   Sq Epi: x / Non Sq Epi: x / Bacteria: x     GYNECOLOGIC ONCOLOGY PROGRESS NOTE    PENELOPE FLAHERTY is a 58y now POD#9 s/p ex-lap AZALIA BSO, cytoreductive surgery HIPEC, ostomy creation, c/b bladder perforation, repaired intraoperatively; received 2u pRBC intraoperatively. Total 4u pRBCs during hospitalization.    S:    Patient was seen and examined at bedside.  Patient denies any pain.  Was transitioned to NPO from CLD after emesis yesterday. Denies any nausea or emesis overnight.   Desai in place; draining clear yellow urine.   Continues to ambulate  She denies lightheadedness, dizziness, palpitations, chest pain and SOB.   OBJECTIVE:     VITALS:  T(F): 97.9 (02-15-24 @ 04:08), Max: 97.9 (02-15-24 @ 04:08)  HR: 87 (02-15-24 @ 04:08) (87 - 112)  BP: 114/71 (02-15-24 @ 04:08) (94/67 - 120/79)  RR: 18 (02-15-24 @ 04:08) (18 - 20)  SpO2: 97% (02-15-24 @ 04:08) (95% - 97%)  Wt(kg): --    I&O's Detail    14 Feb 2024 07:01  -  15 Feb 2024 07:00  --------------------------------------------------------  IN:  Total IN: 0 mL    OUT:    Colostomy (mL): 1150 mL    Emesis (mL): 700 mL  Total OUT: 1850 mL    Total NET: -1850 mL          MEDICATIONS  (STANDING):  benzocaine 20% Spray 1 Spray(s) Topical every 6 hours  benzocaine/menthol Lozenge 1 Lozenge Oral two times a day  famotidine Injectable 20 milliGRAM(s) IV Push every 12 hours  influenza   Vaccine 0.5 milliLiter(s) IntraMuscular once  lactated ringers. 1000 milliLiter(s) (75 mL/Hr) IV Continuous <Continuous>  metoprolol succinate ER 25 milliGRAM(s) Oral daily  pantoprazole  Injectable 40 milliGRAM(s) IV Push every 12 hours  valsartan 40 milliGRAM(s) Oral daily    MEDICATIONS  (PRN):  metoclopramide Injectable 10 milliGRAM(s) IV Push every 6 hours PRN Nausea and/or Vomiting  ondansetron Injectable 2 milliGRAM(s) IV Push every 6 hours PRN Nausea and/or Vomiting  simethicone 80 milliGRAM(s) Chew every 6 hours PRN Gas      Physical Exam:  Resp: breathing comfortably on RA; lungs CTAB  Abdomen: soft, mildly distended, nontender; + BS noted.   GI: ostomy, productive; was recently emptied so minimal output; 500cc overnight.  : desai in place draining clear yellow urine  Pelvic: no active vaginal bleeding   Incision:C/D/I with staples in place  Extremities: No calf tenderness or edema. SCDs in place       LABS:                        10.8   11.29 )-----------( 254      ( 14 Feb 2024 06:04 )             33.5     02-14    135  |  98  |  14.0  ----------------------------<  112<H>  3.6   |  19.0<L>  |  0.89    Ca    8.8      14 Feb 2024 06:04  Phos  4.6     02-14  Mg     1.8     02-14        Urinalysis Basic - ( 14 Feb 2024 06:04 )    Color: x / Appearance: x / SG: x / pH: x  Gluc: 112 mg/dL / Ketone: x  / Bili: x / Urobili: x   Blood: x / Protein: x / Nitrite: x   Leuk Esterase: x / RBC: x / WBC x   Sq Epi: x / Non Sq Epi: x / Bacteria: x

## 2024-02-16 ENCOUNTER — APPOINTMENT (OUTPATIENT)
Dept: INFUSION THERAPY | Facility: CANCER CENTER | Age: 59
End: 2024-02-16

## 2024-02-16 LAB
ANION GAP SERPL CALC-SCNC: 15 MMOL/L — SIGNIFICANT CHANGE UP (ref 5–17)
BASOPHILS # BLD AUTO: 0.03 K/UL — SIGNIFICANT CHANGE UP (ref 0–0.2)
BASOPHILS NFR BLD AUTO: 0.4 % — SIGNIFICANT CHANGE UP (ref 0–2)
BUN SERPL-MCNC: 25.1 MG/DL — HIGH (ref 8–20)
CALCIUM SERPL-MCNC: 8.4 MG/DL — SIGNIFICANT CHANGE UP (ref 8.4–10.5)
CHLORIDE SERPL-SCNC: 100 MMOL/L — SIGNIFICANT CHANGE UP (ref 96–108)
CO2 SERPL-SCNC: 22 MMOL/L — SIGNIFICANT CHANGE UP (ref 22–29)
CREAT SERPL-MCNC: 1.07 MG/DL — SIGNIFICANT CHANGE UP (ref 0.5–1.3)
EGFR: 61 ML/MIN/1.73M2 — SIGNIFICANT CHANGE UP
EOSINOPHIL # BLD AUTO: 0.1 K/UL — SIGNIFICANT CHANGE UP (ref 0–0.5)
EOSINOPHIL NFR BLD AUTO: 1.2 % — SIGNIFICANT CHANGE UP (ref 0–6)
GLUCOSE SERPL-MCNC: 77 MG/DL — SIGNIFICANT CHANGE UP (ref 70–99)
HCT VFR BLD CALC: 28.7 % — LOW (ref 34.5–45)
HGB BLD-MCNC: 9.2 G/DL — LOW (ref 11.5–15.5)
IMM GRANULOCYTES NFR BLD AUTO: 0.4 % — SIGNIFICANT CHANGE UP (ref 0–0.9)
LYMPHOCYTES # BLD AUTO: 1.58 K/UL — SIGNIFICANT CHANGE UP (ref 1–3.3)
LYMPHOCYTES # BLD AUTO: 19.2 % — SIGNIFICANT CHANGE UP (ref 13–44)
MAGNESIUM SERPL-MCNC: 2 MG/DL — SIGNIFICANT CHANGE UP (ref 1.8–2.6)
MCHC RBC-ENTMCNC: 30.5 PG — SIGNIFICANT CHANGE UP (ref 27–34)
MCHC RBC-ENTMCNC: 32.1 GM/DL — SIGNIFICANT CHANGE UP (ref 32–36)
MCV RBC AUTO: 95 FL — SIGNIFICANT CHANGE UP (ref 80–100)
MONOCYTES # BLD AUTO: 1.03 K/UL — HIGH (ref 0–0.9)
MONOCYTES NFR BLD AUTO: 12.5 % — SIGNIFICANT CHANGE UP (ref 2–14)
NEUTROPHILS # BLD AUTO: 5.44 K/UL — SIGNIFICANT CHANGE UP (ref 1.8–7.4)
NEUTROPHILS NFR BLD AUTO: 66.3 % — SIGNIFICANT CHANGE UP (ref 43–77)
PHOSPHATE SERPL-MCNC: 3.8 MG/DL — SIGNIFICANT CHANGE UP (ref 2.4–4.7)
PLATELET # BLD AUTO: 249 K/UL — SIGNIFICANT CHANGE UP (ref 150–400)
POTASSIUM SERPL-MCNC: 3.8 MMOL/L — SIGNIFICANT CHANGE UP (ref 3.5–5.3)
POTASSIUM SERPL-SCNC: 3.8 MMOL/L — SIGNIFICANT CHANGE UP (ref 3.5–5.3)
RBC # BLD: 3.02 M/UL — LOW (ref 3.8–5.2)
RBC # FLD: 16.3 % — HIGH (ref 10.3–14.5)
SODIUM SERPL-SCNC: 137 MMOL/L — SIGNIFICANT CHANGE UP (ref 135–145)
WBC # BLD: 8.21 K/UL — SIGNIFICANT CHANGE UP (ref 3.8–10.5)
WBC # FLD AUTO: 8.21 K/UL — SIGNIFICANT CHANGE UP (ref 3.8–10.5)

## 2024-02-16 RX ADMIN — Medication 25 MILLIGRAM(S): at 06:44

## 2024-02-16 RX ADMIN — PANTOPRAZOLE SODIUM 40 MILLIGRAM(S): 20 TABLET, DELAYED RELEASE ORAL at 12:27

## 2024-02-16 RX ADMIN — ONDANSETRON 2 MILLIGRAM(S): 8 TABLET, FILM COATED ORAL at 13:17

## 2024-02-16 RX ADMIN — ENOXAPARIN SODIUM 40 MILLIGRAM(S): 100 INJECTION SUBCUTANEOUS at 17:25

## 2024-02-16 RX ADMIN — BENZOCAINE AND MENTHOL 1 LOZENGE: 5; 1 LIQUID ORAL at 17:25

## 2024-02-16 RX ADMIN — BENZOCAINE AND MENTHOL 1 LOZENGE: 5; 1 LIQUID ORAL at 06:44

## 2024-02-16 RX ADMIN — FAMOTIDINE 20 MILLIGRAM(S): 10 INJECTION INTRAVENOUS at 17:25

## 2024-02-16 RX ADMIN — ONDANSETRON 2 MILLIGRAM(S): 8 TABLET, FILM COATED ORAL at 06:44

## 2024-02-16 RX ADMIN — FAMOTIDINE 20 MILLIGRAM(S): 10 INJECTION INTRAVENOUS at 06:43

## 2024-02-16 RX ADMIN — PANTOPRAZOLE SODIUM 40 MILLIGRAM(S): 20 TABLET, DELAYED RELEASE ORAL at 00:12

## 2024-02-16 RX ADMIN — SIMETHICONE 80 MILLIGRAM(S): 80 TABLET, CHEWABLE ORAL at 19:37

## 2024-02-16 NOTE — DISCHARGE NOTE PROVIDER - PROVIDER TOKENS
PROVIDER:[TOKEN:[[id number]],FOLLOWUP:[2 weeks]] PROVIDER:[TOKEN:[[id number]],FOLLOWUP:[1 week],ESTABLISHEDPATIENT:[T]],PROVIDER:[TOKEN:[[id number]],FOLLOWUP:[2 weeks]]

## 2024-02-16 NOTE — DISCHARGE NOTE PROVIDER - REASON FOR ADMISSION
Pt was called and given the information below from Ashleigh SEN., pt gave a verbal understanding and had no further questions.    There is nothing on her list that she needs to hold based on my review.    Post operative care

## 2024-02-16 NOTE — DISCHARGE NOTE PROVIDER - NSDCCPTREATMENT_GEN_ALL_CORE_FT
PRINCIPAL PROCEDURE  Procedure: AZALIA & BSO (total abdominal hysterectomy and bilateral salpingo-oophorectomy)  Findings and Treatment:       SECONDARY PROCEDURE  Procedure: Debulking, neoplasm, malignant, intra-abdominal, with HIPEC  Findings and Treatment: including diaphragrmatic peritoneum resection, small bowel and rectosigmoid implant excision, mesentary repair    Procedure: Laparotomy, with HIPEC  Findings and Treatment:

## 2024-02-16 NOTE — DISCHARGE NOTE PROVIDER - NSDCCPCAREPLAN_GEN_ALL_CORE_FT
PRINCIPAL DISCHARGE DIAGNOSIS  Diagnosis: Malignant neoplasm of unspecified ovary  Assessment and Plan of Treatment:

## 2024-02-16 NOTE — DISCHARGE NOTE PROVIDER - NSDCFUADDINST_GEN_ALL_CORE_FT
- Please contact the office for any pain uncontrolled by medication, excessive bleeding or Fever>100.4  - Please call the office for a follow-up with your doctor in 1 week  - You can take ibuprofen 600mg every 6 hours and tylenol 975mg every 6 hours as needed for pain.  - Oxycodone should be used for severe pain only  - You may walk and climb stairs as often as youd like, no vigorous activity, do not lift anything greater than 10lbs, nothing per vagina x 6 weeks, do not drive while on pain medication.  - Stool softeners (like senna) and mild laxatives like miralax can help with bowel regularity. Constipation is a common complaint after surgery and straining should be avoided.  - Eliquis is prescribed to you for the next month. You should take it twice a day, 12 hours apart. 8am and 8pm would work.

## 2024-02-16 NOTE — DISCHARGE NOTE PROVIDER - NSDCMRMEDTOKEN_GEN_ALL_CORE_FT
ASA 81mg:   furosemide 40 mg oral tablet: 1 tab(s) orally  Lipitor 80 mg oral tablet: 1 tab(s) orally  metoprolol succinate 50 mg oral tablet, extended release: 1 tab(s) orally  valsartan 40 mg oral tablet: 1 tab(s) orally once a day   acetaminophen 325 mg oral tablet: 3 tab(s) orally every 6 hours  Eliquis 2.5 mg oral tablet: 1 tab(s) orally every 12 hours  furosemide 40 mg oral tablet: 1 tab(s) orally  ibuprofen 600 mg oral tablet: 1 tab(s) orally every 6 hours  Lipitor 80 mg oral tablet: 1 tab(s) orally  metoprolol succinate 50 mg oral tablet, extended release: 1 tab(s) orally every 24 hours  valsartan 40 mg oral tablet: 1 tab(s) orally once a day

## 2024-02-16 NOTE — PROGRESS NOTE ADULT - SUBJECTIVE AND OBJECTIVE BOX
GYNECOLOGIC ONCOLOGY PROGRESS NOTE    PENELOPE FLAHERTY is a 58y now POD#10 s/p ex-lap AZALIA BSO, cytoreductive surgery HIPEC, ostomy creation, c/b bladder perforation, repaired intraoperatively; received 2u pRBC intraoperatively. Total 4u pRBCs during hospitalization.    S:    Patient was seen and examined at bedside.  Patient denies any pain.  Patient tolerating CLD. Mild nausea that resolved with prn medications. No emesis  Desai in place; draining clear yellow urine.   Continues to ambulate  Ostomy with gas and stool at bedside.   She denies lightheadedness, dizziness, palpitations, chest pain and SOB.       OBJECTIVE:     VITALS:  T(F): 97.9 (02-16-24 @ 04:13), Max: 98.1 (02-15-24 @ 10:16)  HR: 86 (02-16-24 @ 04:13) (86 - 91)  BP: 109/69 (02-16-24 @ 04:13) (109/69 - 131/76)  RR: 18 (02-16-24 @ 04:13) (18 - 19)  SpO2: 96% (02-16-24 @ 04:13) (95% - 96%)  Wt(kg): --    I&O's Detail    14 Feb 2024 07:01  -  15 Feb 2024 07:00  --------------------------------------------------------  IN:  Total IN: 0 mL    OUT:    Colostomy (mL): 1150 mL    Emesis (mL): 700 mL  Total OUT: 1850 mL    Total NET: -1850 mL      15 Feb 2024 07:01  -  16 Feb 2024 06:19  --------------------------------------------------------  IN:  Total IN: 0 mL    OUT:    Indwelling Catheter - Urethral (mL): 1350 mL  Total OUT: 1350 mL    Total NET: -1350 mL          MEDICATIONS  (STANDING):  benzocaine 20% Spray 1 Spray(s) Topical every 6 hours  benzocaine/menthol Lozenge 1 Lozenge Oral two times a day  enoxaparin Injectable 40 milliGRAM(s) SubCutaneous every 24 hours  famotidine Injectable 20 milliGRAM(s) IV Push every 12 hours  influenza   Vaccine 0.5 milliLiter(s) IntraMuscular once  metoprolol succinate ER 25 milliGRAM(s) Oral daily  pantoprazole  Injectable 40 milliGRAM(s) IV Push every 12 hours  valsartan 40 milliGRAM(s) Oral daily    MEDICATIONS  (PRN):  acetaminophen     Tablet .. 975 milliGRAM(s) Oral <User Schedule> PRN Mild Pain (1 - 3)  ibuprofen  Tablet. 600 milliGRAM(s) Oral every 6 hours PRN Moderate Pain (4 - 6)  metoclopramide Injectable 10 milliGRAM(s) IV Push every 6 hours PRN Nausea and/or Vomiting  ondansetron Injectable 2 milliGRAM(s) IV Push every 6 hours PRN Nausea and/or Vomiting  simethicone 80 milliGRAM(s) Chew every 6 hours PRN Gas      Physical Exam:  Resp: breathing comfortably on RA; lungs CTAB  Abdomen: soft, nondistended, nontender; + BS noted.   GI: ostomy, productive; approx 250cc with gas in bag  : desai in place draining dark yellow urine  Pelvic: no active vaginal bleeding   Incision:C/D/I with staples in place  Extremities: No calf tenderness or edema. SCDs in place       LABS:                        9.8    7.68  )-----------( 253      ( 15 Feb 2024 06:15 )             31.3     02-15    138  |  102  |  31.2<H>  ----------------------------<  90  4.3   |  21.0<L>  |  1.22    Ca    8.7      15 Feb 2024 06:15  Phos  4.8     02-15  Mg     2.7     02-15        Urinalysis Basic - ( 15 Feb 2024 06:15 )    Color: x / Appearance: x / SG: x / pH: x  Gluc: 90 mg/dL / Ketone: x  / Bili: x / Urobili: x   Blood: x / Protein: x / Nitrite: x   Leuk Esterase: x / RBC: x / WBC x   Sq Epi: x / Non Sq Epi: x / Bacteria: x

## 2024-02-16 NOTE — DISCHARGE NOTE PROVIDER - NSDCFUSCHEDAPPT_GEN_ALL_CORE_FT
Mulu Prieto  [facility]  [facility]  Scheduled Appointment: 02/22/2024     Mulu Prieto  [facility]  [facility]  Scheduled Appointment: 03/11/2024

## 2024-02-16 NOTE — PROGRESS NOTE ADULT - ASSESSMENT
PENELOPE FLAHERTY is a 58y now POD#10 s/p ex-lap AZALIA BSO, cytoreductive surgery HIPEC, ostomy creation, c/b bladder perforation, repaired intraoperatively; received 2u pRBC intraoperatively. Total 4u pRBCs during hospitalization.    Neuro: Well controlled. Continue current pain regimen.   CV: Blood pressure moderately controlled. Home meds ordered.  Pulm: Saturating well on room air. Incentive spirometer use encouraged.   GI: ostomy productive; tolerating CLD no emesis overnight. Consider transition to FLD today.  On famotidine.  : Desai in place, s/p bladder repair intraop; to stay in for at least 2-3w; plan for CT urogram prior to removal. UOP adequate, hematuria imrproved. Urology consulted; recommend desai to stay in for 2-3 weeks Can consider higher [place] desai if continues to clot. CT cystogram if urine does not clear up; appreciate recs  Heme: Acute blood loss anemia Hgb 9.7 ->8.54 > 7.8>7.6>7.7> 7.3>7.2>7.2>6.8 asymptomatic> 2u pRBCs >9.4>9.7 >10.8> 9.8> AM labs pending  ID: Afebrile. WBC 11.14> 10.37>16.61> 13.57>11.18>10.37>11.44 > 11.29>7.68 AM labs pending No antibiotics indicated at this time.   FEN: CLD. Resolved HAGMA. Lactate downtrended to 1.5.  Replete electrolytes prn.   Skin: No active disease.   Psych: No active disease.   DVT ppx: Increased Ambulation encouraged, SCDs when in bed, lovenox ordered  Dispo: Continued in patient management

## 2024-02-16 NOTE — DISCHARGE NOTE PROVIDER - CARE PROVIDER_API CALL
Miroslava Perez  Gynecologic Oncology  [address] [place] [id number]  Phone: [phone]  Fax: [phone]  Follow Up Time: 2 weeks   Miroslava Perez  Gynecologic Oncology  [address] [place] [id number]  Phone: [phone]  Fax: [phone]  Established Patient  Follow Up Time: 1 week    Martinez Layton  Urology  [address] [place] [address]  Phone: [phone]  Fax: [phone]  Follow Up Time: 2 weeks

## 2024-02-16 NOTE — DISCHARGE NOTE PROVIDER - CARE PROVIDERS DIRECT ADDRESSES
[email or web address]

## 2024-02-16 NOTE — DISCHARGE NOTE PROVIDER - [facility] CARE ORDERS SPECIFY:
Please notify Dr Perez's office if leaking or dislodged as this may warrant more acute evaluation given her bladder injury

## 2024-02-17 LAB
ANION GAP SERPL CALC-SCNC: 16 MMOL/L — SIGNIFICANT CHANGE UP (ref 5–17)
BASOPHILS # BLD AUTO: 0.04 K/UL — SIGNIFICANT CHANGE UP (ref 0–0.2)
BASOPHILS NFR BLD AUTO: 0.4 % — SIGNIFICANT CHANGE UP (ref 0–2)
BUN SERPL-MCNC: 16.4 MG/DL — SIGNIFICANT CHANGE UP (ref 8–20)
CALCIUM SERPL-MCNC: 8.5 MG/DL — SIGNIFICANT CHANGE UP (ref 8.4–10.5)
CHLORIDE SERPL-SCNC: 97 MMOL/L — SIGNIFICANT CHANGE UP (ref 96–108)
CO2 SERPL-SCNC: 20 MMOL/L — LOW (ref 22–29)
CREAT SERPL-MCNC: 0.83 MG/DL — SIGNIFICANT CHANGE UP (ref 0.5–1.3)
EGFR: 83 ML/MIN/1.73M2 — SIGNIFICANT CHANGE UP
EOSINOPHIL # BLD AUTO: 0.11 K/UL — SIGNIFICANT CHANGE UP (ref 0–0.5)
EOSINOPHIL NFR BLD AUTO: 1.1 % — SIGNIFICANT CHANGE UP (ref 0–6)
GLUCOSE SERPL-MCNC: 76 MG/DL — SIGNIFICANT CHANGE UP (ref 70–99)
HCT VFR BLD CALC: 28.6 % — LOW (ref 34.5–45)
HGB BLD-MCNC: 9.4 G/DL — LOW (ref 11.5–15.5)
IMM GRANULOCYTES NFR BLD AUTO: 0.5 % — SIGNIFICANT CHANGE UP (ref 0–0.9)
LYMPHOCYTES # BLD AUTO: 1.58 K/UL — SIGNIFICANT CHANGE UP (ref 1–3.3)
LYMPHOCYTES # BLD AUTO: 16.3 % — SIGNIFICANT CHANGE UP (ref 13–44)
MAGNESIUM SERPL-MCNC: 1.8 MG/DL — SIGNIFICANT CHANGE UP (ref 1.6–2.6)
MCHC RBC-ENTMCNC: 30.3 PG — SIGNIFICANT CHANGE UP (ref 27–34)
MCHC RBC-ENTMCNC: 32.9 GM/DL — SIGNIFICANT CHANGE UP (ref 32–36)
MCV RBC AUTO: 92.3 FL — SIGNIFICANT CHANGE UP (ref 80–100)
MONOCYTES # BLD AUTO: 1.06 K/UL — HIGH (ref 0–0.9)
MONOCYTES NFR BLD AUTO: 10.9 % — SIGNIFICANT CHANGE UP (ref 2–14)
NEUTROPHILS # BLD AUTO: 6.87 K/UL — SIGNIFICANT CHANGE UP (ref 1.8–7.4)
NEUTROPHILS NFR BLD AUTO: 70.8 % — SIGNIFICANT CHANGE UP (ref 43–77)
PHOSPHATE SERPL-MCNC: 3.8 MG/DL — SIGNIFICANT CHANGE UP (ref 2.4–4.7)
PLATELET # BLD AUTO: 259 K/UL — SIGNIFICANT CHANGE UP (ref 150–400)
POTASSIUM SERPL-MCNC: 3.7 MMOL/L — SIGNIFICANT CHANGE UP (ref 3.5–5.3)
POTASSIUM SERPL-SCNC: 3.7 MMOL/L — SIGNIFICANT CHANGE UP (ref 3.5–5.3)
RBC # BLD: 3.1 M/UL — LOW (ref 3.8–5.2)
RBC # FLD: 15.6 % — HIGH (ref 10.3–14.5)
SODIUM SERPL-SCNC: 133 MMOL/L — LOW (ref 135–145)
WBC # BLD: 9.71 K/UL — SIGNIFICANT CHANGE UP (ref 3.8–10.5)
WBC # FLD AUTO: 9.71 K/UL — SIGNIFICANT CHANGE UP (ref 3.8–10.5)

## 2024-02-17 RX ORDER — POTASSIUM CHLORIDE 20 MEQ
10 PACKET (EA) ORAL
Refills: 0 | Status: COMPLETED | OUTPATIENT
Start: 2024-02-17 | End: 2024-02-17

## 2024-02-17 RX ORDER — MAGNESIUM SULFATE 500 MG/ML
2 VIAL (ML) INJECTION ONCE
Refills: 0 | Status: COMPLETED | OUTPATIENT
Start: 2024-02-17 | End: 2024-02-17

## 2024-02-17 RX ADMIN — Medication 25 MILLIGRAM(S): at 05:26

## 2024-02-17 RX ADMIN — BENZOCAINE AND MENTHOL 1 LOZENGE: 5; 1 LIQUID ORAL at 05:27

## 2024-02-17 RX ADMIN — FAMOTIDINE 20 MILLIGRAM(S): 10 INJECTION INTRAVENOUS at 17:56

## 2024-02-17 RX ADMIN — Medication 100 MILLIEQUIVALENT(S): at 13:53

## 2024-02-17 RX ADMIN — FAMOTIDINE 20 MILLIGRAM(S): 10 INJECTION INTRAVENOUS at 05:31

## 2024-02-17 RX ADMIN — PANTOPRAZOLE SODIUM 40 MILLIGRAM(S): 20 TABLET, DELAYED RELEASE ORAL at 15:04

## 2024-02-17 RX ADMIN — PANTOPRAZOLE SODIUM 40 MILLIGRAM(S): 20 TABLET, DELAYED RELEASE ORAL at 05:22

## 2024-02-17 RX ADMIN — Medication 100 MILLIEQUIVALENT(S): at 15:04

## 2024-02-17 RX ADMIN — VALSARTAN 40 MILLIGRAM(S): 80 TABLET ORAL at 05:27

## 2024-02-17 RX ADMIN — Medication 100 MILLIEQUIVALENT(S): at 12:58

## 2024-02-17 RX ADMIN — Medication 25 GRAM(S): at 17:56

## 2024-02-17 RX ADMIN — ENOXAPARIN SODIUM 40 MILLIGRAM(S): 100 INJECTION SUBCUTANEOUS at 17:57

## 2024-02-17 RX ADMIN — BENZOCAINE AND MENTHOL 1 LOZENGE: 5; 1 LIQUID ORAL at 17:58

## 2024-02-17 NOTE — PROGRESS NOTE ADULT - SUBJECTIVE AND OBJECTIVE BOX
GYNECOLOGIC ONCOLOGY PROGRESS NOTE    PENELOPE FLAHERTY is a 58y now POD#11 s/p ex-lap AZALIA BSO, cytoreductive surgery HIPEC, ostomy creation, c/b bladder perforation, repaired intraoperatively; received 2u pRBC intraoperatively. Total 4u pRBCs during hospitalization.      PROBLEMS:  Malignant neoplasm of unspecified ovary  Screening for substance abuse  HTN (hypertension)  Language barrier to communication  At risk for sleep apnea      Pt seen and examined at bedside.     SUBJECTIVE:    No acute events overnight.  Pain well-controlled. Pt reports intermittent nausea, relieved with antiemetics   Denies Vomiting or Diarrhea. Tolerating FLD.   Denies shortness of breath, chest pain or dyspnea on exertion.      OBJECTIVE:     VITALS:  T(F): 98.6 (02-17-24 @ 10:15), Max: 98.6 (02-17-24 @ 10:15)  HR: 79 (02-17-24 @ 10:15) (75 - 86)  BP: 142/83 (02-17-24 @ 10:15) (121/70 - 150/81)  RR: 18 (02-17-24 @ 10:15) (18 - 18)  SpO2: 95% (02-17-24 @ 10:15) (95% - 98%)  Wt(kg): --    I&O's Summary    16 Feb 2024 07:01  -  17 Feb 2024 07:00  --------------------------------------------------------  IN: 0 mL / OUT: 1385 mL / NET: -1385 mL        MEDICATIONS  (STANDING):  benzocaine 20% Spray 1 Spray(s) Topical every 6 hours  benzocaine/menthol Lozenge 1 Lozenge Oral two times a day  enoxaparin Injectable 40 milliGRAM(s) SubCutaneous every 24 hours  famotidine Injectable 20 milliGRAM(s) IV Push every 12 hours  influenza   Vaccine 0.5 milliLiter(s) IntraMuscular once  magnesium sulfate  IVPB 2 Gram(s) IV Intermittent once  metoprolol succinate ER 25 milliGRAM(s) Oral daily  pantoprazole  Injectable 40 milliGRAM(s) IV Push every 12 hours  potassium chloride  10 mEq/100 mL IVPB 10 milliEquivalent(s) IV Intermittent every 1 hour  valsartan 40 milliGRAM(s) Oral daily    MEDICATIONS  (PRN):  acetaminophen     Tablet .. 975 milliGRAM(s) Oral <User Schedule> PRN Mild Pain (1 - 3)  ibuprofen  Tablet. 600 milliGRAM(s) Oral every 6 hours PRN Moderate Pain (4 - 6)  metoclopramide Injectable 10 milliGRAM(s) IV Push every 6 hours PRN Nausea and/or Vomiting  ondansetron Injectable 2 milliGRAM(s) IV Push every 6 hours PRN Nausea and/or Vomiting  simethicone 80 milliGRAM(s) Chew every 6 hours PRN Gas      Physical Exam:  Resp: breathing comfortably on RA  Abdomen: soft, nondistended, nontender  GI: ostomy, minimally productive; approx. 10cc with gas in bag  : desai in place draining dark yellow urine  Pelvic: no active vaginal bleeding   Incision:C/D/I with staples in place  Extremities: No calf tenderness or edema. SCDs in place       LABS:                        9.4    9.71  )-----------( 259      ( 17 Feb 2024 05:42 )             28.6     02-17    133<L>  |  97  |  16.4  ----------------------------<  76  3.7   |  20.0<L>  |  0.83    Ca    8.5      17 Feb 2024 05:42  Phos  3.8     02-17  Mg     1.8     02-17        Urinalysis Basic - ( 17 Feb 2024 05:42 )    Color: x / Appearance: x / SG: x / pH: x  Gluc: 76 mg/dL / Ketone: x  / Bili: x / Urobili: x   Blood: x / Protein: x / Nitrite: x   Leuk Esterase: x / RBC: x / WBC x   Sq Epi: x / Non Sq Epi: x / Bacteria: x

## 2024-02-17 NOTE — PROGRESS NOTE ADULT - ASSESSMENT
PENELOPE FLAHERTY is a 58y now POD#11 s/p ex-lap AZALIA BSO, cytoreductive surgery HIPEC, ostomy creation, c/b bladder perforation, repaired intraoperatively; received 2u pRBC intraoperatively. Total 4u pRBCs during hospitalization.    Neuro: Well controlled. Continue current pain regimen.   CV: Blood pressure moderately controlled. Home meds ordered.  Pulm: Saturating well on room air. Incentive spirometer use encouraged.   GI: tolerating FLD, given minimal ostomy output will hold off on diet advance today. On famotidine.  : Desai in place, s/p bladder repair intraop; to stay in for at least 2-3w; plan for CT urogram prior to removal. UOP adequate, hematuria imrproved. Urology consulted; recommend desai to stay in for 2-3 weeks Can consider higher [place] desai if continues to clot. CT cystogram if urine does not clear up; appreciate recs  Heme: Acute blood loss anemia Hgb 9.7 ->8.54 > 7.8>7.6>7.7> 7.3>7.2>7.2>6.8 asymptomatic> 2u pRBCs >9.4>9.7 >10.8> 9.8>9.2>9.4  ID: Afebrile. WBC 11.14> 10.37>16.61> 13.57>11.18>10.37>11.44 > 11.29>7.68; stable   FEN: FLD. Resolved HAGMA. Lactate downtrended to 1.5.  Will Replete electrolytes prn.   Skin: No active disease.   Psych: No active disease.   DVT ppx: Increased Ambulation encouraged, SCDs when in bed, lovenox ordered  Dispo: Continued in patient management

## 2024-02-18 LAB
ANION GAP SERPL CALC-SCNC: 14 MMOL/L — SIGNIFICANT CHANGE UP (ref 5–17)
BASOPHILS # BLD AUTO: 0.04 K/UL — SIGNIFICANT CHANGE UP (ref 0–0.2)
BASOPHILS NFR BLD AUTO: 0.4 % — SIGNIFICANT CHANGE UP (ref 0–2)
BUN SERPL-MCNC: 8.9 MG/DL — SIGNIFICANT CHANGE UP (ref 8–20)
CALCIUM SERPL-MCNC: 8.5 MG/DL — SIGNIFICANT CHANGE UP (ref 8.4–10.5)
CHLORIDE SERPL-SCNC: 97 MMOL/L — SIGNIFICANT CHANGE UP (ref 96–108)
CO2 SERPL-SCNC: 22 MMOL/L — SIGNIFICANT CHANGE UP (ref 22–29)
CREAT SERPL-MCNC: 0.66 MG/DL — SIGNIFICANT CHANGE UP (ref 0.5–1.3)
EGFR: 103 ML/MIN/1.73M2 — SIGNIFICANT CHANGE UP
EOSINOPHIL # BLD AUTO: 0.08 K/UL — SIGNIFICANT CHANGE UP (ref 0–0.5)
EOSINOPHIL NFR BLD AUTO: 0.8 % — SIGNIFICANT CHANGE UP (ref 0–6)
GLUCOSE SERPL-MCNC: 90 MG/DL — SIGNIFICANT CHANGE UP (ref 70–99)
HCT VFR BLD CALC: 29.3 % — LOW (ref 34.5–45)
HGB BLD-MCNC: 9.4 G/DL — LOW (ref 11.5–15.5)
IMM GRANULOCYTES NFR BLD AUTO: 0.4 % — SIGNIFICANT CHANGE UP (ref 0–0.9)
LYMPHOCYTES # BLD AUTO: 1.76 K/UL — SIGNIFICANT CHANGE UP (ref 1–3.3)
LYMPHOCYTES # BLD AUTO: 18.3 % — SIGNIFICANT CHANGE UP (ref 13–44)
MAGNESIUM SERPL-MCNC: 2 MG/DL — SIGNIFICANT CHANGE UP (ref 1.6–2.6)
MCHC RBC-ENTMCNC: 29.7 PG — SIGNIFICANT CHANGE UP (ref 27–34)
MCHC RBC-ENTMCNC: 32.1 GM/DL — SIGNIFICANT CHANGE UP (ref 32–36)
MCV RBC AUTO: 92.4 FL — SIGNIFICANT CHANGE UP (ref 80–100)
MONOCYTES # BLD AUTO: 0.98 K/UL — HIGH (ref 0–0.9)
MONOCYTES NFR BLD AUTO: 10.2 % — SIGNIFICANT CHANGE UP (ref 2–14)
NEUTROPHILS # BLD AUTO: 6.72 K/UL — SIGNIFICANT CHANGE UP (ref 1.8–7.4)
NEUTROPHILS NFR BLD AUTO: 69.9 % — SIGNIFICANT CHANGE UP (ref 43–77)
PHOSPHATE SERPL-MCNC: 3.7 MG/DL — SIGNIFICANT CHANGE UP (ref 2.4–4.7)
PLATELET # BLD AUTO: 313 K/UL — SIGNIFICANT CHANGE UP (ref 150–400)
POTASSIUM SERPL-MCNC: 3.8 MMOL/L — SIGNIFICANT CHANGE UP (ref 3.5–5.3)
POTASSIUM SERPL-SCNC: 3.8 MMOL/L — SIGNIFICANT CHANGE UP (ref 3.5–5.3)
RBC # BLD: 3.17 M/UL — LOW (ref 3.8–5.2)
RBC # FLD: 15.2 % — HIGH (ref 10.3–14.5)
SODIUM SERPL-SCNC: 133 MMOL/L — LOW (ref 135–145)
WBC # BLD: 9.62 K/UL — SIGNIFICANT CHANGE UP (ref 3.8–10.5)
WBC # FLD AUTO: 9.62 K/UL — SIGNIFICANT CHANGE UP (ref 3.8–10.5)

## 2024-02-18 RX ADMIN — ENOXAPARIN SODIUM 40 MILLIGRAM(S): 100 INJECTION SUBCUTANEOUS at 17:49

## 2024-02-18 RX ADMIN — BENZOCAINE AND MENTHOL 1 LOZENGE: 5; 1 LIQUID ORAL at 23:44

## 2024-02-18 RX ADMIN — BENZOCAINE AND MENTHOL 1 LOZENGE: 5; 1 LIQUID ORAL at 06:16

## 2024-02-18 RX ADMIN — Medication 975 MILLIGRAM(S): at 06:10

## 2024-02-18 RX ADMIN — FAMOTIDINE 20 MILLIGRAM(S): 10 INJECTION INTRAVENOUS at 17:49

## 2024-02-18 RX ADMIN — FAMOTIDINE 20 MILLIGRAM(S): 10 INJECTION INTRAVENOUS at 06:14

## 2024-02-18 RX ADMIN — PANTOPRAZOLE SODIUM 40 MILLIGRAM(S): 20 TABLET, DELAYED RELEASE ORAL at 12:54

## 2024-02-18 RX ADMIN — Medication 25 MILLIGRAM(S): at 06:10

## 2024-02-18 RX ADMIN — Medication 975 MILLIGRAM(S): at 07:00

## 2024-02-18 RX ADMIN — PANTOPRAZOLE SODIUM 40 MILLIGRAM(S): 20 TABLET, DELAYED RELEASE ORAL at 03:23

## 2024-02-18 RX ADMIN — VALSARTAN 40 MILLIGRAM(S): 80 TABLET ORAL at 06:11

## 2024-02-18 NOTE — PROGRESS NOTE ADULT - SUBJECTIVE AND OBJECTIVE BOX
GYNECOLOGIC ONCOLOGY PROGRESS NOTE    PENELOPE FLAHERTY is a 58y now POD#12 s/p ex-lap AZALIA BSO, cytoreductive surgery HIPEC, ostomy creation, c/b bladder perforation, repaired intraoperatively; received 2u pRBC intraoperatively. Total 4u pRBCs during hospitalization.      PROBLEMS:  Malignant neoplasm of unspecified ovary  Screening for substance abuse  HTN (hypertension)  Language barrier to communication  At risk for sleep apnea      Pt seen and examined at bedside.     SUBJECTIVE:    Pt seen and examined, no complaints this am. Pain well controlled.   Denies Nausea, Vomiting or Diarrhea. Tolerating FLD.   Denies shortness of breath, chest pain or dyspnea on exertion.    OBJECTIVE:     VITALS:  T(F): 97.7 (02-18-24 @ 04:42), Max: 98.6 (02-17-24 @ 10:15)  HR: 82 (02-18-24 @ 04:42) (79 - 99)  BP: 138/79 (02-18-24 @ 04:42) (121/73 - 142/83)  RR: 18 (02-18-24 @ 04:42) (18 - 20)  SpO2: 96% (02-18-24 @ 04:42) (93% - 96%)      I&O's Summary    17 Feb 2024 07:01  -  18 Feb 2024 07:00  --------------------------------------------------------  IN: 1600 mL / OUT: 1740 mL / NET: -140 mL        MEDICATIONS  (STANDING):  benzocaine 20% Spray 1 Spray(s) Topical every 6 hours  benzocaine/menthol Lozenge 1 Lozenge Oral two times a day  enoxaparin Injectable 40 milliGRAM(s) SubCutaneous every 24 hours  famotidine Injectable 20 milliGRAM(s) IV Push every 12 hours  influenza   Vaccine 0.5 milliLiter(s) IntraMuscular once  metoprolol succinate ER 25 milliGRAM(s) Oral daily  pantoprazole  Injectable 40 milliGRAM(s) IV Push every 12 hours  valsartan 40 milliGRAM(s) Oral daily    MEDICATIONS  (PRN):  acetaminophen     Tablet .. 975 milliGRAM(s) Oral <User Schedule> PRN Mild Pain (1 - 3)  ibuprofen  Tablet. 600 milliGRAM(s) Oral every 6 hours PRN Moderate Pain (4 - 6)  metoclopramide Injectable 10 milliGRAM(s) IV Push every 6 hours PRN Nausea and/or Vomiting  ondansetron Injectable 2 milliGRAM(s) IV Push every 6 hours PRN Nausea and/or Vomiting  simethicone 80 milliGRAM(s) Chew every 6 hours PRN Gas      Physical Exam:  Resp: breathing comfortably on RA  Abdomen: soft, nondistended, nontender  GI: ostomy, minimally productive; approx. 5cc with gas in bag  : desai in place draining concentrated urine  Pelvic: no active vaginal bleeding   Incision: C/D/I with staples in place  Extremities: No calf tenderness or edema. SCDs in place       LABS:                        9.4    9.71  )-----------( 259      ( 17 Feb 2024 05:42 )             28.6     02-17    133<L>  |  97  |  16.4  ----------------------------<  76  3.7   |  20.0<L>  |  0.83    Ca    8.5      17 Feb 2024 05:42  Phos  3.8     02-17  Mg     1.8     02-17        Urinalysis Basic - ( 17 Feb 2024 05:42 )    Color: x / Appearance: x / SG: x / pH: x  Gluc: 76 mg/dL / Ketone: x  / Bili: x / Urobili: x   Blood: x / Protein: x / Nitrite: x   Leuk Esterase: x / RBC: x / WBC x   Sq Epi: x / Non Sq Epi: x / Bacteria: x

## 2024-02-18 NOTE — PROGRESS NOTE ADULT - ASSESSMENT
A/P  PENELOPE FLAHERTY is a 58y now POD#12 s/p ex-lap AZALIA BSO, cytoreductive surgery HIPEC, ostomy creation, c/b bladder perforation, repaired intraoperatively; received 2u pRBC intraoperatively. Total 4u pRBCs during hospitalization.    Neuro: Well controlled. Continue current pain regimen.   CV: Blood pressure moderately controlled. Home meds ordered.  Pulm: Saturating well on room air. Incentive spirometer use encouraged.   GI: tolerating FLD, given minimal ostomy output will hold off on diet advance today. On famotidine.  : Desai in place, s/p bladder repair intraop; to stay in for at least 2-3w; plan for CT urogram prior to removal. UOP adequate, hematuria imrproved. Urology consulted; recommend desai to stay in for 2-3 weeks Can consider higher [place] desai if continues to clot. CT cystogram if urine does not clear up; appreciate recs  Heme: Acute blood loss anemia Hgb 9.7 ->8.54 > 7.8>7.6>7.7> 7.3>7.2>7.2>6.8 asymptomatic> 2u pRBCs >9.4>9.7 >10.8> 9.8>9.2>9.4> am labs pending  ID: Afebrile. WBC 11.14> 10.37>16.61> 13.57>11.18>10.37>11.44 > 11.29>7.68; stable   FEN: FLD. Resolved HAGMA. Lactate downtrended to 1.5.  Will Replete electrolytes prn.   Skin: No active disease.   Psych: No active disease.   DVT ppx: Increased Ambulation encouraged, SCDs when in bed, lovenox ordered  Dispo: Continued in patient management

## 2024-02-19 LAB
ANION GAP SERPL CALC-SCNC: 16 MMOL/L — SIGNIFICANT CHANGE UP (ref 5–17)
APPEARANCE UR: ABNORMAL
APPEARANCE UR: ABNORMAL
BACTERIA # UR AUTO: ABNORMAL /HPF
BACTERIA # UR AUTO: ABNORMAL /HPF
BILIRUB UR-MCNC: NEGATIVE — SIGNIFICANT CHANGE UP
BILIRUB UR-MCNC: NEGATIVE — SIGNIFICANT CHANGE UP
BUN SERPL-MCNC: 7.4 MG/DL — LOW (ref 8–20)
CALCIUM SERPL-MCNC: 8.5 MG/DL — SIGNIFICANT CHANGE UP (ref 8.4–10.5)
CHLORIDE SERPL-SCNC: 96 MMOL/L — SIGNIFICANT CHANGE UP (ref 96–108)
CO2 SERPL-SCNC: 23 MMOL/L — SIGNIFICANT CHANGE UP (ref 22–29)
COLOR SPEC: ABNORMAL
COLOR SPEC: YELLOW — SIGNIFICANT CHANGE UP
CREAT SERPL-MCNC: 0.79 MG/DL — SIGNIFICANT CHANGE UP (ref 0.5–1.3)
DIFF PNL FLD: ABNORMAL
DIFF PNL FLD: ABNORMAL
EGFR: 88 ML/MIN/1.73M2 — SIGNIFICANT CHANGE UP
GLUCOSE SERPL-MCNC: 89 MG/DL — SIGNIFICANT CHANGE UP (ref 70–99)
GLUCOSE UR QL: NEGATIVE MG/DL — SIGNIFICANT CHANGE UP
GLUCOSE UR QL: NEGATIVE MG/DL — SIGNIFICANT CHANGE UP
HCT VFR BLD CALC: 29.1 % — LOW (ref 34.5–45)
HGB BLD-MCNC: 9.7 G/DL — LOW (ref 11.5–15.5)
KETONES UR-MCNC: ABNORMAL MG/DL
KETONES UR-MCNC: NEGATIVE MG/DL — SIGNIFICANT CHANGE UP
LEUKOCYTE ESTERASE UR-ACNC: ABNORMAL
LEUKOCYTE ESTERASE UR-ACNC: ABNORMAL
MAGNESIUM SERPL-MCNC: 1.7 MG/DL — SIGNIFICANT CHANGE UP (ref 1.6–2.6)
MCHC RBC-ENTMCNC: 30.5 PG — SIGNIFICANT CHANGE UP (ref 27–34)
MCHC RBC-ENTMCNC: 33.3 GM/DL — SIGNIFICANT CHANGE UP (ref 32–36)
MCV RBC AUTO: 91.5 FL — SIGNIFICANT CHANGE UP (ref 80–100)
NITRITE UR-MCNC: POSITIVE
NITRITE UR-MCNC: POSITIVE
PH UR: 6 — SIGNIFICANT CHANGE UP (ref 5–8)
PH UR: 6 — SIGNIFICANT CHANGE UP (ref 5–8)
PHOSPHATE SERPL-MCNC: 3.5 MG/DL — SIGNIFICANT CHANGE UP (ref 2.4–4.7)
PLATELET # BLD AUTO: 334 K/UL — SIGNIFICANT CHANGE UP (ref 150–400)
POTASSIUM SERPL-MCNC: 3.3 MMOL/L — LOW (ref 3.5–5.3)
POTASSIUM SERPL-SCNC: 3.3 MMOL/L — LOW (ref 3.5–5.3)
PROT UR-MCNC: 300 MG/DL
PROT UR-MCNC: SIGNIFICANT CHANGE UP MG/DL
RBC # BLD: 3.18 M/UL — LOW (ref 3.8–5.2)
RBC # FLD: 15.4 % — HIGH (ref 10.3–14.5)
RBC CASTS # UR COMP ASSIST: 100 /HPF — HIGH (ref 0–4)
RBC CASTS # UR COMP ASSIST: 9 /HPF — HIGH (ref 0–4)
SODIUM SERPL-SCNC: 135 MMOL/L — SIGNIFICANT CHANGE UP (ref 135–145)
SP GR SPEC: 1.01 — SIGNIFICANT CHANGE UP (ref 1–1.03)
SP GR SPEC: <1.005 — LOW (ref 1–1.03)
SQUAMOUS # UR AUTO: 1 /HPF — SIGNIFICANT CHANGE UP (ref 0–5)
SQUAMOUS # UR AUTO: >36 /HPF — HIGH (ref 0–5)
UROBILINOGEN FLD QL: 0.2 MG/DL — SIGNIFICANT CHANGE UP (ref 0.2–1)
UROBILINOGEN FLD QL: 1 MG/DL — SIGNIFICANT CHANGE UP (ref 0.2–1)
WBC # BLD: 8.52 K/UL — SIGNIFICANT CHANGE UP (ref 3.8–10.5)
WBC # FLD AUTO: 8.52 K/UL — SIGNIFICANT CHANGE UP (ref 3.8–10.5)
WBC UR QL: 303 /HPF — HIGH (ref 0–5)
WBC UR QL: >998 /HPF — HIGH (ref 0–5)

## 2024-02-19 RX ORDER — POTASSIUM CHLORIDE 20 MEQ
20 PACKET (EA) ORAL
Refills: 0 | Status: COMPLETED | OUTPATIENT
Start: 2024-02-19 | End: 2024-02-19

## 2024-02-19 RX ORDER — CEFTRIAXONE 500 MG/1
INJECTION, POWDER, FOR SOLUTION INTRAMUSCULAR; INTRAVENOUS
Refills: 0 | Status: DISCONTINUED | OUTPATIENT
Start: 2024-02-19 | End: 2024-02-20

## 2024-02-19 RX ORDER — CEFTRIAXONE 500 MG/1
1000 INJECTION, POWDER, FOR SOLUTION INTRAMUSCULAR; INTRAVENOUS ONCE
Refills: 0 | Status: COMPLETED | OUTPATIENT
Start: 2024-02-19 | End: 2024-02-19

## 2024-02-19 RX ORDER — CEFTRIAXONE 500 MG/1
INJECTION, POWDER, FOR SOLUTION INTRAMUSCULAR; INTRAVENOUS
Refills: 0 | Status: DISCONTINUED | OUTPATIENT
Start: 2024-02-19 | End: 2024-02-19

## 2024-02-19 RX ORDER — ACETAMINOPHEN 500 MG
975 TABLET ORAL EVERY 6 HOURS
Refills: 0 | Status: DISCONTINUED | OUTPATIENT
Start: 2024-02-19 | End: 2024-02-21

## 2024-02-19 RX ORDER — MAGNESIUM SULFATE 500 MG/ML
2 VIAL (ML) INJECTION ONCE
Refills: 0 | Status: COMPLETED | OUTPATIENT
Start: 2024-02-19 | End: 2024-02-19

## 2024-02-19 RX ORDER — CEFTRIAXONE 500 MG/1
1000 INJECTION, POWDER, FOR SOLUTION INTRAMUSCULAR; INTRAVENOUS EVERY 24 HOURS
Refills: 0 | Status: DISCONTINUED | OUTPATIENT
Start: 2024-02-20 | End: 2024-02-20

## 2024-02-19 RX ADMIN — Medication 20 MILLIEQUIVALENT(S): at 10:32

## 2024-02-19 RX ADMIN — PANTOPRAZOLE SODIUM 40 MILLIGRAM(S): 20 TABLET, DELAYED RELEASE ORAL at 05:21

## 2024-02-19 RX ADMIN — FAMOTIDINE 20 MILLIGRAM(S): 10 INJECTION INTRAVENOUS at 18:15

## 2024-02-19 RX ADMIN — ENOXAPARIN SODIUM 40 MILLIGRAM(S): 100 INJECTION SUBCUTANEOUS at 18:15

## 2024-02-19 RX ADMIN — Medication 25 MILLIGRAM(S): at 05:12

## 2024-02-19 RX ADMIN — FAMOTIDINE 20 MILLIGRAM(S): 10 INJECTION INTRAVENOUS at 05:26

## 2024-02-19 RX ADMIN — PANTOPRAZOLE SODIUM 40 MILLIGRAM(S): 20 TABLET, DELAYED RELEASE ORAL at 23:19

## 2024-02-19 RX ADMIN — CEFTRIAXONE 1000 MILLIGRAM(S): 500 INJECTION, POWDER, FOR SOLUTION INTRAMUSCULAR; INTRAVENOUS at 05:09

## 2024-02-19 RX ADMIN — PANTOPRAZOLE SODIUM 40 MILLIGRAM(S): 20 TABLET, DELAYED RELEASE ORAL at 12:22

## 2024-02-19 RX ADMIN — Medication 975 MILLIGRAM(S): at 23:19

## 2024-02-19 RX ADMIN — Medication 20 MILLIEQUIVALENT(S): at 12:22

## 2024-02-19 RX ADMIN — VALSARTAN 40 MILLIGRAM(S): 80 TABLET ORAL at 05:11

## 2024-02-19 RX ADMIN — Medication 20 MILLIEQUIVALENT(S): at 13:21

## 2024-02-19 RX ADMIN — Medication 975 MILLIGRAM(S): at 19:15

## 2024-02-19 RX ADMIN — Medication 25 GRAM(S): at 10:34

## 2024-02-19 RX ADMIN — Medication 975 MILLIGRAM(S): at 18:15

## 2024-02-19 RX ADMIN — Medication 975 MILLIGRAM(S): at 13:22

## 2024-02-19 RX ADMIN — Medication 975 MILLIGRAM(S): at 14:22

## 2024-02-19 RX ADMIN — BENZOCAINE AND MENTHOL 1 LOZENGE: 5; 1 LIQUID ORAL at 05:12

## 2024-02-19 NOTE — PROGRESS NOTE ADULT - SUBJECTIVE AND OBJECTIVE BOX
GYNECOLOGIC ONCOLOGY PROGRESS NOTE    PENELOPE FLAHERTY is a 58y now POD#13 s/p ex-lap AZALIA BSO, cytoreductive surgery HIPEC, ostomy creation, c/b bladder perforation, repaired intraoperatively; received 2u pRBC intraoperatively. Total 4u pRBCs during hospitalization.      PROBLEMS:  Malignant neoplasm of unspecified ovary  Screening for substance abuse  HTN (hypertension)  Language barrier to communication  At risk for sleep apnea      Pt seen and examined at bedside.     SUBJECTIVE:    Pt seen and examined, no complaints this am.   Denies Nausea, Vomiting or Diarrhea.  Tolerating regular diet.    Denies shortness of breath, chest pain or dyspnea on exertion.    OBJECTIVE:     VITALS:  T(F): 97.7 (02-19-24 @ 09:28), Max: 98.4 (02-18-24 @ 16:01)  HR: 84 (02-19-24 @ 09:28) (75 - 91)  BP: 125/81 (02-19-24 @ 09:28) (122/80 - 147/88)  RR: 18 (02-19-24 @ 09:28) (16 - 19)  SpO2: 94% (02-19-24 @ 09:28) (94% - 96%)      I&O's Summary    18 Feb 2024 07:01  -  19 Feb 2024 07:00  --------------------------------------------------------  IN: 0 mL / OUT: 1810 mL / NET: -1810 mL        MEDICATIONS  (STANDING):  benzocaine 20% Spray 1 Spray(s) Topical every 6 hours  benzocaine/menthol Lozenge 1 Lozenge Oral two times a day  cefTRIAXone Injectable.      enoxaparin Injectable 40 milliGRAM(s) SubCutaneous every 24 hours  famotidine Injectable 20 milliGRAM(s) IV Push every 12 hours  influenza   Vaccine 0.5 milliLiter(s) IntraMuscular once  metoprolol succinate ER 25 milliGRAM(s) Oral daily  pantoprazole  Injectable 40 milliGRAM(s) IV Push every 12 hours  valsartan 40 milliGRAM(s) Oral daily    MEDICATIONS  (PRN):  acetaminophen     Tablet .. 975 milliGRAM(s) Oral <User Schedule> PRN Mild Pain (1 - 3)  ibuprofen  Tablet. 600 milliGRAM(s) Oral every 6 hours PRN Moderate Pain (4 - 6)  metoclopramide Injectable 10 milliGRAM(s) IV Push every 6 hours PRN Nausea and/or Vomiting  ondansetron Injectable 2 milliGRAM(s) IV Push every 6 hours PRN Nausea and/or Vomiting  simethicone 80 milliGRAM(s) Chew every 6 hours PRN Gas      Physical Exam:  Resp: breathing comfortably on RA  Abdomen: soft, nondistended, nontender  GI: ostomy, minimally productive; approx. 5cc with gas in bag  : desai in place draining yellow urine  Pelvic: no active vaginal bleeding   Incision: C/D/I with staples in place  Extremities: No calf tenderness or edema. SCDs in place     LABS:                        9.7    8.52  )-----------( 334      ( 19 Feb 2024 05:58 )             29.1     02-19    135  |  96  |  7.4<L>  ----------------------------<  89  3.3<L>   |  23.0  |  0.79    Ca    8.5      19 Feb 2024 05:58  Phos  3.5     02-19  Mg     1.7     02-19        Urinalysis Basic - ( 19 Feb 2024 05:58 )    Color: x / Appearance: x / SG: x / pH: x  Gluc: 89 mg/dL / Ketone: x  / Bili: x / Urobili: x   Blood: x / Protein: x / Nitrite: x   Leuk Esterase: x / RBC: x / WBC x   Sq Epi: x / Non Sq Epi: x / Bacteria: x

## 2024-02-19 NOTE — PROGRESS NOTE ADULT - ASSESSMENT
PENELOPE FLAHERTY is a 58y now POD#13 s/p ex-lap AZALIA BSO, cytoreductive surgery HIPEC, ostomy creation, c/b bladder perforation, repaired intraoperatively; received 2u pRBC intraoperatively. Total 4u pRBCs during hospitalization.    Neuro: Well controlled. Continue current pain regimen.   CV: Blood pressure moderately controlled. Home meds ordered.  Pulm: Saturating well on room air. Incentive spirometer use encouraged.   GI: tolerating regular diet, 10 ccostomy output overnight. On famotidine.  : Desai in place, s/p bladder repair intraop; to stay in for at least 2-3w; plan for CT urogram prior to removal. UOP adequate, hematuria imrproved. Urology consulted; recommend desai to stay in for 2-3 weeks Can consider higher [place]ian desai if continues to clot. CT cystogram if urine does not clear up; appreciate recs. UTI noted yesterday- desai replaced   Heme: Acute blood loss anemia Hgb 9.7 ->8.54 > 7.8>7.6>7.7> 7.3>7.2>7.2>6.8 asymptomatic> 2u pRBCs >9.4>9.7 >10.8> 9.8>9.2>9.4> am labs pending  ID: Afebrile. WBC 11.14> 10.37>16.61> 13.57>11.18>10.37>11.44 > 11.29>7.68; stable   FEN: FLD. Resolved HAGMA. Lactate downtrended to 1.5.  Will Replete electrolytes prn.   Skin: No active disease.   Psych: No active disease.   DVT ppx: Increased Ambulation encouraged, SCDs when in bed, lovenox ordered  Dispo: Continued in patient management PENELOPE FLAHERTY is a 58y now POD#13 s/p ex-lap AZALIA BSO, cytoreductive surgery HIPEC, ostomy creation, c/b bladder perforation, repaired intraoperatively; received 2u pRBC intraoperatively. Total 4u pRBCs during hospitalization.    Neuro: Well controlled. Continue current pain regimen.   CV: Blood pressure moderately controlled. Home meds ordered.  Pulm: Saturating well on room air. Incentive spirometer use encouraged.   GI: tolerating regular diet, 10cc ostomy output overnight. On famotidine.  : Desai in place, s/p bladder repair intraop; to stay in for at least 2-3w; plan for CT urogram prior to removal. UOP adequate, hematuria improved. Urology consulted; recommend desai to stay in for 2-3 weeks Can consider higher [place] desai if continues to clot. CT cystogram if urine does not clear up; appreciate recs. UTI noted yesterday- desai replaced, on rocephin, ucx pending  Heme: Acute blood loss anemia Hgb 9.7 ->8.54 > 7.8>7.6>7.7> 7.3>7.2>7.2>6.8 asymptomatic> 2u pRBCs >9.4>9.7 >10.8> 9.8>9.2>9.4> am labs pending  ID: Afebrile. WBC 11.14> 10.37>16.61> 13.57>11.18>10.37>11.44 > 11.29>7.68; stable   FEN: FLD. Resolved HAGMA. Lactate downtrended to 1.5.  Will Replete electrolytes prn.   Skin: No active disease.   Psych: No active disease.   DVT ppx: Increased Ambulation encouraged, SCDs when in bed, lovenox ordered  Dispo: Continued in patient management

## 2024-02-20 ENCOUNTER — TRANSCRIPTION ENCOUNTER (OUTPATIENT)
Age: 59
End: 2024-02-20

## 2024-02-20 LAB
ALBUMIN SERPL ELPH-MCNC: 2.9 G/DL — LOW (ref 3.3–5.2)
ALP SERPL-CCNC: 88 U/L — SIGNIFICANT CHANGE UP (ref 40–120)
ALT FLD-CCNC: 5 U/L — SIGNIFICANT CHANGE UP
ANION GAP SERPL CALC-SCNC: 11 MMOL/L — SIGNIFICANT CHANGE UP (ref 5–17)
AST SERPL-CCNC: 10 U/L — SIGNIFICANT CHANGE UP
BILIRUB SERPL-MCNC: 0.3 MG/DL — LOW (ref 0.4–2)
BUN SERPL-MCNC: 7.9 MG/DL — LOW (ref 8–20)
CALCIUM SERPL-MCNC: 9 MG/DL — SIGNIFICANT CHANGE UP (ref 8.4–10.5)
CHLORIDE SERPL-SCNC: 102 MMOL/L — SIGNIFICANT CHANGE UP (ref 96–108)
CO2 SERPL-SCNC: 26 MMOL/L — SIGNIFICANT CHANGE UP (ref 22–29)
CREAT SERPL-MCNC: 0.89 MG/DL — SIGNIFICANT CHANGE UP (ref 0.5–1.3)
CULTURE RESULTS: SIGNIFICANT CHANGE UP
EGFR: 76 ML/MIN/1.73M2 — SIGNIFICANT CHANGE UP
GLUCOSE SERPL-MCNC: 95 MG/DL — SIGNIFICANT CHANGE UP (ref 70–99)
HCT VFR BLD CALC: 29 % — LOW (ref 34.5–45)
HGB BLD-MCNC: 9.3 G/DL — LOW (ref 11.5–15.5)
MAGNESIUM SERPL-MCNC: 2.1 MG/DL — SIGNIFICANT CHANGE UP (ref 1.8–2.6)
MCHC RBC-ENTMCNC: 29.8 PG — SIGNIFICANT CHANGE UP (ref 27–34)
MCHC RBC-ENTMCNC: 32.1 GM/DL — SIGNIFICANT CHANGE UP (ref 32–36)
MCV RBC AUTO: 92.9 FL — SIGNIFICANT CHANGE UP (ref 80–100)
PHOSPHATE SERPL-MCNC: 3.9 MG/DL — SIGNIFICANT CHANGE UP (ref 2.4–4.7)
PLATELET # BLD AUTO: 339 K/UL — SIGNIFICANT CHANGE UP (ref 150–400)
POTASSIUM SERPL-MCNC: 4.7 MMOL/L — SIGNIFICANT CHANGE UP (ref 3.5–5.3)
POTASSIUM SERPL-SCNC: 4.7 MMOL/L — SIGNIFICANT CHANGE UP (ref 3.5–5.3)
PROT SERPL-MCNC: 6.4 G/DL — LOW (ref 6.6–8.7)
RBC # BLD: 3.12 M/UL — LOW (ref 3.8–5.2)
RBC # FLD: 15.4 % — HIGH (ref 10.3–14.5)
SODIUM SERPL-SCNC: 139 MMOL/L — SIGNIFICANT CHANGE UP (ref 135–145)
SPECIMEN SOURCE: SIGNIFICANT CHANGE UP
WBC # BLD: 7.84 K/UL — SIGNIFICANT CHANGE UP (ref 3.8–10.5)
WBC # FLD AUTO: 7.84 K/UL — SIGNIFICANT CHANGE UP (ref 3.8–10.5)

## 2024-02-20 PROCEDURE — 72192 CT PELVIS W/O DYE: CPT | Mod: 26

## 2024-02-20 RX ORDER — ENOXAPARIN SODIUM 100 MG/ML
40 INJECTION SUBCUTANEOUS EVERY 24 HOURS
Refills: 0 | Status: DISCONTINUED | OUTPATIENT
Start: 2024-02-20 | End: 2024-02-21

## 2024-02-20 RX ADMIN — VALSARTAN 40 MILLIGRAM(S): 80 TABLET ORAL at 05:24

## 2024-02-20 RX ADMIN — Medication 975 MILLIGRAM(S): at 13:21

## 2024-02-20 RX ADMIN — FAMOTIDINE 20 MILLIGRAM(S): 10 INJECTION INTRAVENOUS at 05:24

## 2024-02-20 RX ADMIN — Medication 975 MILLIGRAM(S): at 12:21

## 2024-02-20 RX ADMIN — FAMOTIDINE 20 MILLIGRAM(S): 10 INJECTION INTRAVENOUS at 17:23

## 2024-02-20 RX ADMIN — Medication 25 MILLIGRAM(S): at 05:24

## 2024-02-20 RX ADMIN — Medication 975 MILLIGRAM(S): at 00:19

## 2024-02-20 RX ADMIN — PANTOPRAZOLE SODIUM 40 MILLIGRAM(S): 20 TABLET, DELAYED RELEASE ORAL at 12:28

## 2024-02-20 RX ADMIN — PANTOPRAZOLE SODIUM 40 MILLIGRAM(S): 20 TABLET, DELAYED RELEASE ORAL at 23:52

## 2024-02-20 RX ADMIN — Medication 975 MILLIGRAM(S): at 05:24

## 2024-02-20 RX ADMIN — CEFTRIAXONE 1000 MILLIGRAM(S): 500 INJECTION, POWDER, FOR SOLUTION INTRAMUSCULAR; INTRAVENOUS at 05:24

## 2024-02-20 RX ADMIN — Medication 975 MILLIGRAM(S): at 06:24

## 2024-02-20 RX ADMIN — ENOXAPARIN SODIUM 40 MILLIGRAM(S): 100 INJECTION SUBCUTANEOUS at 21:44

## 2024-02-20 RX ADMIN — Medication 975 MILLIGRAM(S): at 18:24

## 2024-02-20 RX ADMIN — Medication 975 MILLIGRAM(S): at 23:52

## 2024-02-20 RX ADMIN — Medication 975 MILLIGRAM(S): at 17:24

## 2024-02-20 NOTE — PROGRESS NOTE ADULT - SUBJECTIVE AND OBJECTIVE BOX
GYNECOLOGIC ONCOLOGY PROGRESS NOTE    PENELOPE FLAHERTY is a 58y now POD#14 s/p ex-lap AZALIA BSO, cytoreductive surgery HIPEC, ostomy creation, c/b bladder perforation, repaired intraoperatively; received 2u pRBC intraoperatively. Total 4u pRBCs during hospitalization.      PROBLEMS:  Malignant neoplasm of unspecified ovary  Screening for substance abuse  HTN (hypertension)  Language barrier to communication  At risk for sleep apnea      Pt seen and examined at bedside.     SUBJECTIVE:    Pt seen and examined, no complaints this am.   Pain controlled  Reports some occasional costochondritis at right breast when she takes a deep breath - counseled on incentive spirometry  Denies Nausea, Vomiting or Diarrhea.  Tolerating regular diet.    Denies shortness of breath, chest pain or dyspnea on exertion.    OBJECTIVE:     VITALS:  Vital Signs Last 24 Hrs  T(C): 36.4 (20 Feb 2024 04:31), Max: 36.7 (20 Feb 2024 00:20)  T(F): 97.6 (20 Feb 2024 04:31), Max: 98 (20 Feb 2024 00:20)  HR: 79 (20 Feb 2024 04:31) (79 - 87)  BP: 116/75 (20 Feb 2024 04:31) (100/66 - 127/78)  RR: 18 (20 Feb 2024 04:31) (18 - 19)  SpO2: 97% (20 Feb 2024 04:31) (94% - 97%)    Parameters below as of 20 Feb 2024 04:31  Patient On (Oxygen Delivery Method): room air          I&O's Summary  I&O's Summary    18 Feb 2024 07:01  -  19 Feb 2024 07:00  --------------------------------------------------------  IN: 0 mL / OUT: 1810 mL / NET: -1810 mL    19 Feb 2024 07:01  -  20 Feb 2024 06:50  --------------------------------------------------------  IN: 240 mL / OUT: 1065 mL / NET: -825 mL        18 Feb 2024 07:01  -  19 Feb 2024 07:00  --------------------------------------------------------  IN: 0 mL / OUT: 1810 mL / NET: -1810 mL        MEDICATIONS  (STANDING):  benzocaine 20% Spray 1 Spray(s) Topical every 6 hours  benzocaine/menthol Lozenge 1 Lozenge Oral two times a day  cefTRIAXone Injectable.      enoxaparin Injectable 40 milliGRAM(s) SubCutaneous every 24 hours  famotidine Injectable 20 milliGRAM(s) IV Push every 12 hours  influenza   Vaccine 0.5 milliLiter(s) IntraMuscular once  metoprolol succinate ER 25 milliGRAM(s) Oral daily  pantoprazole  Injectable 40 milliGRAM(s) IV Push every 12 hours  valsartan 40 milliGRAM(s) Oral daily    MEDICATIONS  (PRN):  acetaminophen     Tablet .. 975 milliGRAM(s) Oral <User Schedule> PRN Mild Pain (1 - 3)  ibuprofen  Tablet. 600 milliGRAM(s) Oral every 6 hours PRN Moderate Pain (4 - 6)  metoclopramide Injectable 10 milliGRAM(s) IV Push every 6 hours PRN Nausea and/or Vomiting  ondansetron Injectable 2 milliGRAM(s) IV Push every 6 hours PRN Nausea and/or Vomiting  simethicone 80 milliGRAM(s) Chew every 6 hours PRN Gas      Physical Exam:  Resp: breathing comfortably on RA  Abdomen: soft, nondistended, nontender  GI: ostomy, minimally productive; approx. 5cc with gas in bag  : desai in place draining yellow urine  Pelvic: no active vaginal bleeding   Incision: C/D/I with staples in place  Extremities: No calf tenderness or edema. SCDs in place     LABS:                                   9.3    7.84  )-----------( 339      ( 20 Feb 2024 05:04 )             29.0                9.7    8.52  )-----------( 334      ( 19 Feb 2024 05:58 )             29.1     02-19    135  |  96  |  7.4<L>  ----------------------------<  89  3.3<L>   |  23.0  |  0.79    Ca    8.5      19 Feb 2024 05:58  Phos  3.5     02-19  Mg     1.7     02-19        Urinalysis Basic - ( 19 Feb 2024 05:58 )    Color: x / Appearance: x / SG: x / pH: x  Gluc: 89 mg/dL / Ketone: x  / Bili: x / Urobili: x   Blood: x / Protein: x / Nitrite: x   Leuk Esterase: x / RBC: x / WBC x   Sq Epi: x / Non Sq Epi: x / Bacteria: x

## 2024-02-20 NOTE — PROGRESS NOTE ADULT - ASSESSMENT
PENELOPE FLAHERTY is a 58y now POD#14 s/p ex-lap AZALIA BSO, cytoreductive surgery HIPEC, ostomy creation, c/b bladder perforation, repaired intraoperatively; received 2u pRBC intraoperatively. Total 4u pRBCs during hospitalization.    Neuro: Well controlled. Continue current pain regimen.   CV: Blood pressure moderately controlled. Home meds ordered.  Pulm: Saturating well on room air. Incentive spirometer use encouraged.   GI: tolerating regular diet, 10cc ostomy output overnight. On famotidine.  : Desai in place, s/p bladder repair intraop; to stay in for at least 2-3w; plan for CT urogram prior to removal. UOP adequate, hematuria improved. Urology consulted; recommend desai to stay in for 2-3 weeks Can consider higher [place] desai if continues to clot. CT cystogram if urine does not clear up; appreciate recs. UTI noted yesterday- desai replaced, on rocephin, ucx pending  Heme: Acute blood loss anemia Hgb 9.7 ->8.54 > 7.8>7.6>7.7> 7.3>7.2>7.2>6.8 asymptomatic> 2u pRBCs >9.4>9.7 >10.8> 9.8>9.2>9.4>9/3 am labs pending  ID: Afebrile. WBC 11.14> 10.37>16.61> 13.57>11.18>10.37>11.44 > 11.29>7.68>7.84; stable   FEN: FLD. Resolved HAGMA. Lactate downtrended to 1.5.  Will Replete electrolytes prn.   Skin: No active disease.   Psych: No active disease.   DVT ppx: Increased Ambulation encouraged, SCDs when in bed, lovenox ordered  Dispo: Continued in patient management

## 2024-02-21 ENCOUNTER — TRANSCRIPTION ENCOUNTER (OUTPATIENT)
Age: 59
End: 2024-02-21

## 2024-02-21 VITALS
OXYGEN SATURATION: 96 % | HEART RATE: 87 BPM | DIASTOLIC BLOOD PRESSURE: 64 MMHG | RESPIRATION RATE: 19 BRPM | TEMPERATURE: 98 F | SYSTOLIC BLOOD PRESSURE: 101 MMHG

## 2024-02-21 LAB
ALBUMIN SERPL ELPH-MCNC: 2.7 G/DL — LOW (ref 3.3–5.2)
ALP SERPL-CCNC: 94 U/L — SIGNIFICANT CHANGE UP (ref 40–120)
ALT FLD-CCNC: 6 U/L — SIGNIFICANT CHANGE UP
ANION GAP SERPL CALC-SCNC: 13 MMOL/L — SIGNIFICANT CHANGE UP (ref 5–17)
AST SERPL-CCNC: 11 U/L — SIGNIFICANT CHANGE UP
BILIRUB SERPL-MCNC: 0.3 MG/DL — LOW (ref 0.4–2)
BUN SERPL-MCNC: 9 MG/DL — SIGNIFICANT CHANGE UP (ref 8–20)
CALCIUM SERPL-MCNC: 8.5 MG/DL — SIGNIFICANT CHANGE UP (ref 8.4–10.5)
CHLORIDE SERPL-SCNC: 100 MMOL/L — SIGNIFICANT CHANGE UP (ref 96–108)
CO2 SERPL-SCNC: 23 MMOL/L — SIGNIFICANT CHANGE UP (ref 22–29)
CREAT SERPL-MCNC: 0.84 MG/DL — SIGNIFICANT CHANGE UP (ref 0.5–1.3)
EGFR: 82 ML/MIN/1.73M2 — SIGNIFICANT CHANGE UP
GLUCOSE SERPL-MCNC: 85 MG/DL — SIGNIFICANT CHANGE UP (ref 70–99)
HCT VFR BLD CALC: 29 % — LOW (ref 34.5–45)
HGB BLD-MCNC: 9.3 G/DL — LOW (ref 11.5–15.5)
MAGNESIUM SERPL-MCNC: 1.8 MG/DL — SIGNIFICANT CHANGE UP (ref 1.6–2.6)
MCHC RBC-ENTMCNC: 29.8 PG — SIGNIFICANT CHANGE UP (ref 27–34)
MCHC RBC-ENTMCNC: 32.1 GM/DL — SIGNIFICANT CHANGE UP (ref 32–36)
MCV RBC AUTO: 92.9 FL — SIGNIFICANT CHANGE UP (ref 80–100)
PHOSPHATE SERPL-MCNC: 4.1 MG/DL — SIGNIFICANT CHANGE UP (ref 2.4–4.7)
PLATELET # BLD AUTO: 373 K/UL — SIGNIFICANT CHANGE UP (ref 150–400)
POTASSIUM SERPL-MCNC: 4.2 MMOL/L — SIGNIFICANT CHANGE UP (ref 3.5–5.3)
POTASSIUM SERPL-SCNC: 4.2 MMOL/L — SIGNIFICANT CHANGE UP (ref 3.5–5.3)
PROT SERPL-MCNC: 6.4 G/DL — LOW (ref 6.6–8.7)
RBC # BLD: 3.12 M/UL — LOW (ref 3.8–5.2)
RBC # FLD: 15.5 % — HIGH (ref 10.3–14.5)
SODIUM SERPL-SCNC: 136 MMOL/L — SIGNIFICANT CHANGE UP (ref 135–145)
WBC # BLD: 7.55 K/UL — SIGNIFICANT CHANGE UP (ref 3.8–10.5)
WBC # FLD AUTO: 7.55 K/UL — SIGNIFICANT CHANGE UP (ref 3.8–10.5)

## 2024-02-21 PROCEDURE — 83605 ASSAY OF LACTIC ACID: CPT

## 2024-02-21 PROCEDURE — 88309 TISSUE EXAM BY PATHOLOGIST: CPT

## 2024-02-21 PROCEDURE — 72192 CT PELVIS W/O DYE: CPT

## 2024-02-21 PROCEDURE — 80076 HEPATIC FUNCTION PANEL: CPT

## 2024-02-21 PROCEDURE — 86901 BLOOD TYPING SEROLOGIC RH(D): CPT

## 2024-02-21 PROCEDURE — 82947 ASSAY GLUCOSE BLOOD QUANT: CPT

## 2024-02-21 PROCEDURE — C1889: CPT

## 2024-02-21 PROCEDURE — 81001 URINALYSIS AUTO W/SCOPE: CPT

## 2024-02-21 PROCEDURE — C9399: CPT

## 2024-02-21 PROCEDURE — 88304 TISSUE EXAM BY PATHOLOGIST: CPT

## 2024-02-21 PROCEDURE — P9040: CPT

## 2024-02-21 PROCEDURE — 88305 TISSUE EXAM BY PATHOLOGIST: CPT

## 2024-02-21 PROCEDURE — 82962 GLUCOSE BLOOD TEST: CPT

## 2024-02-21 PROCEDURE — 74018 RADEX ABDOMEN 1 VIEW: CPT

## 2024-02-21 PROCEDURE — 80048 BASIC METABOLIC PNL TOTAL CA: CPT

## 2024-02-21 PROCEDURE — 88341 IMHCHEM/IMCYTCHM EA ADD ANTB: CPT

## 2024-02-21 PROCEDURE — 86900 BLOOD TYPING SEROLOGIC ABO: CPT

## 2024-02-21 PROCEDURE — 74019 RADEX ABDOMEN 2 VIEWS: CPT

## 2024-02-21 PROCEDURE — 80053 COMPREHEN METABOLIC PANEL: CPT

## 2024-02-21 PROCEDURE — 85014 HEMATOCRIT: CPT

## 2024-02-21 PROCEDURE — 88342 IMHCHEM/IMCYTCHM 1ST ANTB: CPT

## 2024-02-21 PROCEDURE — P9016: CPT

## 2024-02-21 PROCEDURE — 82803 BLOOD GASES ANY COMBINATION: CPT

## 2024-02-21 PROCEDURE — 85610 PROTHROMBIN TIME: CPT

## 2024-02-21 PROCEDURE — 71045 X-RAY EXAM CHEST 1 VIEW: CPT

## 2024-02-21 PROCEDURE — 85045 AUTOMATED RETICULOCYTE COUNT: CPT

## 2024-02-21 PROCEDURE — 84132 ASSAY OF SERUM POTASSIUM: CPT

## 2024-02-21 PROCEDURE — 85730 THROMBOPLASTIN TIME PARTIAL: CPT

## 2024-02-21 PROCEDURE — 86850 RBC ANTIBODY SCREEN: CPT

## 2024-02-21 PROCEDURE — 84295 ASSAY OF SERUM SODIUM: CPT

## 2024-02-21 PROCEDURE — 82435 ASSAY OF BLOOD CHLORIDE: CPT

## 2024-02-21 PROCEDURE — 36415 COLL VENOUS BLD VENIPUNCTURE: CPT

## 2024-02-21 PROCEDURE — 86923 COMPATIBILITY TEST ELECTRIC: CPT

## 2024-02-21 PROCEDURE — 85027 COMPLETE CBC AUTOMATED: CPT

## 2024-02-21 PROCEDURE — 85018 HEMOGLOBIN: CPT

## 2024-02-21 PROCEDURE — 85025 COMPLETE CBC W/AUTO DIFF WBC: CPT

## 2024-02-21 PROCEDURE — 83735 ASSAY OF MAGNESIUM: CPT

## 2024-02-21 PROCEDURE — 82330 ASSAY OF CALCIUM: CPT

## 2024-02-21 PROCEDURE — 84100 ASSAY OF PHOSPHORUS: CPT

## 2024-02-21 PROCEDURE — 36430 TRANSFUSION BLD/BLD COMPNT: CPT

## 2024-02-21 PROCEDURE — 82009 KETONE BODYS QUAL: CPT

## 2024-02-21 PROCEDURE — 87086 URINE CULTURE/COLONY COUNT: CPT

## 2024-02-21 RX ORDER — IBUPROFEN 200 MG
1 TABLET ORAL
Qty: 20 | Refills: 0
Start: 2024-02-21 | End: 2024-02-25

## 2024-02-21 RX ORDER — ACETAMINOPHEN 500 MG
3 TABLET ORAL
Qty: 60 | Refills: 0
Start: 2024-02-21 | End: 2024-02-25

## 2024-02-21 RX ORDER — APIXABAN 2.5 MG/1
1 TABLET, FILM COATED ORAL
Qty: 56 | Refills: 0
Start: 2024-02-21 | End: 2024-03-19

## 2024-02-21 RX ADMIN — Medication 975 MILLIGRAM(S): at 06:06

## 2024-02-21 RX ADMIN — Medication 25 MILLIGRAM(S): at 05:06

## 2024-02-21 RX ADMIN — FAMOTIDINE 20 MILLIGRAM(S): 10 INJECTION INTRAVENOUS at 05:06

## 2024-02-21 RX ADMIN — Medication 975 MILLIGRAM(S): at 13:30

## 2024-02-21 RX ADMIN — PANTOPRAZOLE SODIUM 40 MILLIGRAM(S): 20 TABLET, DELAYED RELEASE ORAL at 13:29

## 2024-02-21 RX ADMIN — Medication 975 MILLIGRAM(S): at 00:52

## 2024-02-21 RX ADMIN — Medication 975 MILLIGRAM(S): at 14:30

## 2024-02-21 RX ADMIN — Medication 975 MILLIGRAM(S): at 05:06

## 2024-02-21 RX ADMIN — Medication 1 TABLET(S): at 08:59

## 2024-02-21 NOTE — PROGRESS NOTE ADULT - ASSESSMENT
PENELOPE FLAHERTY is a 58y now POD#15 s/p ex-lap AZALIA BSO, cytoreductive surgery HIPEC, ostomy creation, c/b bladder perforation, repaired intraoperatively; received 2u pRBC intraoperatively. Total 4u pRBCs during hospitalization.    Neuro: Well controlled. Continue current pain regimen.   CV: Blood pressure moderately controlled. Home meds ordered.  Pulm: Saturating well on room air. Incentive spirometer use encouraged.   GI: tolerating regular diet, 10cc ostomy output overnight. On famotidine.  : Desai in place, s/p bladder repair intraop; UOP adequate, 2.19 UTI possibly noted - desai replaced, started rocephin, ucx <10,000 CFU  2/20 CT cystogram on POD#14 showed connection to mesocolon and rectal pouch. Urology reconsulted; recommend desai to stay in for 6 weeks, Bactrim DS BID started ppx.   Heme: Acute blood loss anemia Hgb 9.7 ->8.54 > 7.8>7.6>7.7> 7.3>7.2>7.2>6.8 asymptomatic> 2u pRBCs >9.4>9.7 >10.8> 9.8>9.2>9.4>9.3> am labs pending  ID: Afebrile. WBC 11.14> 10.37>16.61> 13.57>11.18>10.37>11.44 > 11.29>7.68>7.84; no UTI, ppx abx started. stable   FEN: FLD. Resolved HAGMA from POD#0. Lactate downtrended to 1.5.  Will Replete electrolytes prn.   Skin: No active disease.   Psych: No active disease.   DVT ppx: Increased Ambulation encouraged, SCDs when in bed, lovenox ordered  Dispo: Continued in patient management    discussed with attending Dr Dao

## 2024-02-21 NOTE — DISCHARGE NOTE NURSING/CASE MANAGEMENT/SOCIAL WORK - PATIENT PORTAL LINK FT
You can access the FollowMyHealth Patient Portal offered by [facility] by registering at the following website: [email or web address]. By joining [employer]’s FollowMyHealth portal, you will also be able to view your health information using other applications (apps) compatible with our system.

## 2024-02-21 NOTE — PROGRESS NOTE ADULT - NUTRITIONAL ASSESSMENT
This patient has been assessed with a concern for Malnutrition and has been determined to have a diagnosis/diagnoses of Moderate protein-calorie malnutrition.    This patient is being managed with:   Diet Regular-  Entered: Feb 20 2024  5:25PM  
This patient has been assessed with a concern for Malnutrition and has been determined to have a diagnosis/diagnoses of Moderate protein-calorie malnutrition.    This patient is being managed with:   Diet Regular-  Lactose Restricted (Milk Sugar Intoler.)  Entered: Feb 18 2024 11:24AM  
This patient has been assessed with a concern for Malnutrition and has been determined to have a diagnosis/diagnoses of Moderate protein-calorie malnutrition.    This patient is being managed with:   Diet Full Liquid-  Entered: Feb 16 2024  7:04AM  
This patient has been assessed with a concern for Malnutrition and has been determined to have a diagnosis/diagnoses of Moderate protein-calorie malnutrition.    This patient is being managed with:   Diet Regular-  Lactose Restricted (Milk Sugar Intoler.)  Entered: Feb 18 2024 11:24AM  
This patient has been assessed with a concern for Malnutrition and has been determined to have a diagnosis/diagnoses of Moderate protein-calorie malnutrition.    This patient is being managed with:   Diet Full Liquid-  Entered: Feb 16 2024  7:04AM  
I have personally seen and examined this patient. I have fully participated in the care of this patient. I have reviewed all pertinent clinical information, including history, physical exam, plan and the Medical/PA/NP Student's note and agree except as noted.

## 2024-02-21 NOTE — CHART NOTE - NSCHARTNOTEFT_GEN_A_CORE
Source: Patient [x ]  Family [ ]   other [ x]    Current Diet: Diet, Regular (02-20-24 @ 17:26)      Patient reports [ ] nausea  [ ] vomiting [ ] diarrhea [ ] constipation  [ ]chewing problems [ ] swallowing issues  [ ] other:     PO intake:  < 50% [ x]   50-75%  [ ]   %  [ ]  other :    Current Weight:   (2/21) 186.7 lbs  (2/14) 195.1 lbs  (2/6) 182.9 lbs  ? accuracy of weights, no edema documented, continue to trend and maintain strict Is&Os     Pertinent Medications: MEDICATIONS  (STANDING):)   famotidine Injectable 20 milliGRAM(s) IV Push every 12 hours  metoprolol succinate ER 25 milliGRAM(s) Oral daily  pantoprazole  Injectable 40 milliGRAM(s) IV Push every 12 hours    MEDICATIONS  (PRN):  metoclopramide Injectable 10 milliGRAM(s) IV Push every 6 hours PRN Nausea and/or Vomiting  ondansetron Injectable 2 milliGRAM(s) IV Push every 6 hours PRN Nausea and/or Vomiting  simethicone 80 milliGRAM(s) Chew every 6 hours PRN Gas    Pertinent Labs: CBC Full  -  ( 21 Feb 2024 07:39 )  WBC Count : 7.55 K/uL  RBC Count : 3.12 M/uL  Hemoglobin : 9.3 g/dL  Hematocrit : 29.0 %    Skin: Surgical incision to abdomen per documentation  Sameer: 20    Nutrition focused physical exam previously conducted - found signs of malnutrition [ ]absent [ x]present    Subcutaneous fat loss: [x ] Orbital fat pads region, [ x]Buccal fat region, [ ]Triceps region,  [ ]Ribs region    Muscle wasting: [x ]Temples region, [ x]Clavicle region, [ ]Shoulder region, [ ]Scapula region, [ ]Interosseous region,  [ ]thigh region, [ ]Calf region    Estimated Needs:   [x ] no change since previous assessment  [ ] recalculated:     Current Nutrition Diagnosis: Pt remains at high nutrition risk secondary to malnutrition (moderate, chronic) related to inability to meet sufficient protein-energy in setting of ovarian cancer s/p AZALIA-BSO, HIPEC, ostomy creation, c/b bladder perforation as evidenced by meeting </75% nutrient needs >/1 month and mild muscle/fat loss. Spoke with pt via polish  [place] ID #[id number]. Pt reports poor appetite/po intake. Denies any N/V. Pt declined Ensure supplement, states she had a few sips previously and it made her nauseous. Encouraged HBV protein sources at meals. +colostomy in place. RD to follow up as feasible.     Recommendations:   1) Continue diet as tolerated.  2) Will provide Gelatein BID (160 kcal, 20g protein per serving).  3) Continue Reglan and Zofran PRN.  4) Encourage po intake, monitor diet tolerance, and provide assistance at meals as needed.  5) Obtain daily weights to monitor trends.     Monitoring and Evaluation:   [ x] PO intake [x ] Tolerance to diet prescription [X] Weights  [X] Follow up per protocol [X] Labs: chem 8, mg, phos, H/H
SICU TRANSFER NOTE  -----------------------------  Transfer Date: 02-07-24 @ 12:10    Admission Diagnosis: ovarian cancer, carcinomatosis, metabolic acidosis with hyperchloremia     Active Problems/injuries: ovarian cancer, carcinomatosis    Procedures: AZALIA, BSO, tumor debulking, retrosigmoid resection with colostomy creation, bladder repair, mesentry repair, HIPEC 2/6    Consultants:  [ ] Cardiology  [ ] Endocrine  [ ] Infectious Disease  [ ] Medicine  [ ]Neurosurgery  [ ] Ortho       [ ] Weight Bearing Status:  [ ] Palliative       [ ] Advanced Directives:    [ ] Physical Medicine and Rehab       [ ] Disposition :   [ ] Plastics  [ ] Pulmonary  [x] gyn/onc     Medications  acetaminophen     Tablet .. 975 milliGRAM(s) Oral <User Schedule>  atorvastatin 10 milliGRAM(s) Oral at bedtime  HYDROmorphone  Injectable 0.5 milliGRAM(s) IV Push every 4 hours PRN  ibuprofen  Tablet. 600 milliGRAM(s) Oral every 6 hours  ketorolac   Injectable 15 milliGRAM(s) IV Push every 6 hours PRN  lactated ringers. 1000 milliLiter(s) IV Continuous <Continuous>  metoclopramide Injectable 10 milliGRAM(s) IV Push every 6 hours PRN  metoprolol succinate ER 25 milliGRAM(s) Oral daily  metroNIDAZOLE  IVPB 500 milliGRAM(s) IV Intermittent once  ondansetron Injectable 2 milliGRAM(s) IV Push every 6 hours PRN  oxyCODONE    IR 5 milliGRAM(s) Oral every 3 hours PRN  oxyCODONE    IR 5 milliGRAM(s) Oral once PRN  simethicone 80 milliGRAM(s) Chew every 6 hours PRN  valsartan 40 milliGRAM(s) Oral daily    [x] I attest I have reviewed and reconciled all medications prior to transfer    IV Fluids  lactated ringers.: Solution, 1000 milliLiter(s) infuse at 75 mL/Hr  Provider's Contact #: [phone]  lactated ringers Bolus:   500 milliLiter(s), IV Bolus, once, infuse over 30 Minute(s), Stop After 1 Doses  Provider's Contact #: [phone]  lactated ringers.: Solution, 1000 milliLiter(s) infuse at 75 mL/Hr  Provider's Contact #: [phone]  lactated ringers.: Solution, 1000 milliLiter(s) infuse at 125 mL/Hr  lactated ringers.: Solution, 1000 milliLiter(s) infuse at 150 mL/Hr  Provider's Contact #: [phone]    Indication: post op resuscitation      Antibiotics:  metroNIDAZOLE  IVPB 500 milliGRAM(s) IV Intermittent once    Indication: one time dose intra-op       I have discussed this case with Gyn Onc resident upon transfer and all questions regarding ICU course were answered.  The following items are to be followed up:    Neuro: pain control   - multimodal pain control   - optimize sleep/wake cycle     Pulm:  - saturating well on room air  - encourage IS  - Maintain O2 sat >92%    Cardiac: HTN/HLD  - Continue home metoprolol and valsartan with hold parameters for SBP <110 and HR <60  - Maintain maps >65   - continue non-invasive hemodynamic monitoring     GI: ostomy creation   - midline pervina in place   - ostomy dusky/retracted but patent and able to be digitized, continue to monitor appearance and output   - abdominal binder for comfort   - NPO, NGT to continuous suction, monitor NGT output   - LR 75 for maintenance fluids while NPO    : s/p bladder repair  - intra-op bladder repair, keep desai in place for minimum one week   - monitor UO  - monitor and replete electrolytes as needed   - hyperchloremia resolved, mixed metabolic acidosis resolved     Endo:  - monitor BGL on BMPs    ID:  - received one dose of flagyl intra-op   - monitor for fever and leukocytosis     Heme:  - SCDs  - Begin chemo ppx when okay with primary team   - monitor H/H

## 2024-02-21 NOTE — PROGRESS NOTE ADULT - ATTENDING COMMENTS
Attending Attestation    Ms. Mckeon is a58y now POD#1 s/p ex-lap AZALIA BSO, cytoreductive surgery HIPEC, ostomy creation, c/b bladder perforation, repaired intraop. Received 2u pRBC intraop. Initially requiring SICU admission secondary to lactic acidosis, now improved and transferred to floor. Pt overall doing well, pain mod well controlled. NGT remains in place, particularly bothersome to patient. HDS, H/H stable. PE: abdomen mildly distended, appropriately TTP, no rigidity/rebound/guarding; prevena wound vac in place; Rare hypoactive BS; Ostomy pink, retracted/flush with abdominal wall, no stool in bag yet. Overall, doing moderately well and meeting early post-operative milestones. Will continue with close monitoring.    Odalys Dao MD  Gynecologic Oncology  2/7/24
Attending Attestation    Ms. Mckeon is a 57 yo now POD#15 s/p ex-lap AZALIA BSO, cytoreductive surgery HIPEC, ostomy creation, c/b bladder perforation, repaired intraoperatively. 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 Pt overall doing well. Tolerating reg diet with good bowel function in ostomy. Desai indwelling (upsized to 18Fr catheter). HDS. PE: abdomen soft, mildly distended, non-tender; Midline vertical incision c/d/i, no erythema/drainage/induration; Ostomy pink with good stool output. Overall doing well and meeting post-operative milestones. Stable for discharge home today with close follow-up. Ostomy teaching and ostomy nurse outpt. Plan for d/c home with indwelling desai, Bactrim PPx, CT urogram in ~2wks and urology follow-up. F/u with Dr. Perez next week.     Odalys Dao MD  Gynecologic Oncology  2/21/24
Attending attestation    Ms. Mckeon is a58y now POD#8 s/p ex-lap AZALIA BSO, cytoreductive surgery HIPEC, ostomy creation, c/b bladder perforation s/p intraop repair. Post-operative course c/b ileus and slow ROBF. Overnight, pt tolerating small sips of CLD. Trialed clear ensure this AM and had an episode of emesis; C/o nausea. HDS; PE: abdomen mod distended, no rigidity/rebound/guarding; rare BS; Mild TTP; Midline vertical incision with staples in place; Ostomy pink but retracted, serosanguinous output in bag. Overall, pt doing moderately well, now with continued slow return of bowel function. Will make NPO and obtain KUB. Continue close monitoring.    Odalys Dao MD  Gynecologic Oncology  2/14/24
Await bowel function at colostomy; maintain NGT decompression & GI prophylaxis.  Multimodal pain control.  Hematuria improving.  Asymptomatic for anemia; continue to follow.
Fellow Attestation: patient was seen and examined at bedside.  [id number] was used Polish. Pain tolerable with some irritation in lower abdomen may be secondary to suspected UTI. Denies any nausea/vomiting and hesitant to eat. Abdomen mild distended improved from yesterday with mild bowel sounds (also improved). Ostomy with gas in bag and 20cc of output ON. Staples removed and incision c/d/i. Plan: continue to encourage PO intake, monitor I&O, continue with lovenox and encouraged ambulation. Will consider dc home tomorrow 2/20. d/w Dr. Dao
Appears well with AVSS, afebrile.  + Flatus at colostomy.  Hematuria increased following lack of urine output from the catheter - flushed without improvement and catheter was replaced; monitor closely.  Replete electrolytes.  Increased WBC - repeat this PM; check cultures & consider imaging if uptrending.  Possible NGT clamp trial tomorrow if decreased output and ongoing ostomy function.  Plan of care reviewed with the patient.
Attending Attestation    POD#9 s/p ex-lap AZALIA BSO, cytoreductive surgery HIPEC, ostomy creation, c/b bladder perforation, repaired intraoperatively. Slow ROBF, ostomy now functioning with good output. Nausea improved after NPO yesterday, no further episodes of emesis today. HDS, PE notable for continued mildly distended abdomen, tympanic, but +BS (improved from yesterday); Stoma pink with gas in bag; Will trial repeat advance to CLD today. Continue to monitor closely. Encouraged OOB/ambulation.     Odalys Dao MD  Gynecologic Oncology   2/15/24
Fellow Attestation: patient was seen and examined during morning rounds. Polish  used [id number].  Notes tolerating PO, with out nausea/vomiting and pain is well controlled. Adequate Uop with cloudy/ sediment noted in urine unchanged. Abdomen soft, mild distended with hypo active bowel sounds. Minimal ostomy output however pt self limited PO intake due to dislike of hospital food. Daughter bringing in her own food. VSS and labs stable. Urine Cx pending. Plan: will continue ATC, encourage ambulation, desai to remain in place for cystogram previously recommended by urology. Will await results. For leg bag and ostomy teach. D/w Dr. Perez.      Update: CT cystogram pre-[place] on reviewed showed  extravasation of contrast as well as contrast in rectal stump indicating suspicion for recto-vesiculo fistula. NPO and Lovenox held for further recs from urology for diverting nephrostomy vs surgical repair. Final recs pending, likely will need 6 weeks of additional Desai in place and will transition to PO Bactrim DS BID for 2 weeks. All explained to daughter and patient by Dr. Perez with all questions addressed to apparent satisfaction. Anticipate dc home on 2/21. d/w Dr. Perez
Dark output noted from NGT; start GI prophylaxis.  H/H stable; repeat in AM & transfuse if Hgb < 7.  End colostomy stoma remains viable - monitor perfusion; no flatus.  Discussed plan of care with the patient; her questions were answered to her apparent satisfaction.

## 2024-02-21 NOTE — DISCHARGE NOTE NURSING/CASE MANAGEMENT/SOCIAL WORK - NSDCPEFALRISK_GEN_ALL_CORE
For information on Fall & Injury Prevention, visit: [email or web address] OR  [email or web address] OR  https://www.cdc.gov/steadi/patient.html

## 2024-02-21 NOTE — PROGRESS NOTE ADULT - SUBJECTIVE AND OBJECTIVE BOX
GYNECOLOGIC ONCOLOGY PROGRESS NOTE    PENELOPE FLAHERTY is a 58y now POD#15 s/p ex-lap AZALIA BSO, cytoreductive surgery HIPEC, ostomy creation, c/b bladder perforation, repaired intraoperatively; received 2u pRBC intraoperatively. Total 4u pRBCs during hospitalization.      PROBLEMS:  Malignant neoplasm of unspecified ovary  Screening for substance abuse  HTN (hypertension)  Language barrier to communication  At risk for sleep apnea      Pt seen and examined at bedside.     SUBJECTIVE:  Pt seen and examined, no complaints this am.   Pain controlled  Tolerating more and more of her regular diet w/o nausea or vomiting   Denies shortness of breath, chest pain or dyspnea on exertion.    OBJECTIVE:     VITALS:  Vital Signs Last 24 Hrs  T(C): 36.6 (21 Feb 2024 04:40), Max: 36.9 (20 Feb 2024 09:35)  T(F): 97.9 (21 Feb 2024 04:40), Max: 98.5 (20 Feb 2024 09:35)  HR: 79 (21 Feb 2024 04:40) (79 - 93)  BP: 118/82 (21 Feb 2024 04:40) (106/71 - 148/79)  RR: 18 (21 Feb 2024 04:40) (18 - 19)  SpO2: 97% (21 Feb 2024 04:40) (96% - 97%)    Parameters below as of 21 Feb 2024 04:40  Patient On (Oxygen Delivery Method): room air        Parameters below as of 20 Feb 2024 04:31  Patient On (Oxygen Delivery Method): room air          I&O's Summary  I&O's Summary    19 Feb 2024 07:01  -  20 Feb 2024 07:00  --------------------------------------------------------  IN: 360 mL / OUT: 1065 mL / NET: -705 mL    20 Feb 2024 07:01  -  21 Feb 2024 06:53  --------------------------------------------------------  IN: 360 mL / OUT: 905 mL / NET: -545 mL          MEDICATIONS  (STANDING):  benzocaine 20% Spray 1 Spray(s) Topical every 6 hours  benzocaine/menthol Lozenge 1 Lozenge Oral two times a day  cefTRIAXone Injectable.      enoxaparin Injectable 40 milliGRAM(s) SubCutaneous every 24 hours  famotidine Injectable 20 milliGRAM(s) IV Push every 12 hours  influenza   Vaccine 0.5 milliLiter(s) IntraMuscular once  metoprolol succinate ER 25 milliGRAM(s) Oral daily  pantoprazole  Injectable 40 milliGRAM(s) IV Push every 12 hours  valsartan 40 milliGRAM(s) Oral daily    MEDICATIONS  (PRN):  acetaminophen     Tablet .. 975 milliGRAM(s) Oral <User Schedule> PRN Mild Pain (1 - 3)  ibuprofen  Tablet. 600 milliGRAM(s) Oral every 6 hours PRN Moderate Pain (4 - 6)  metoclopramide Injectable 10 milliGRAM(s) IV Push every 6 hours PRN Nausea and/or Vomiting  ondansetron Injectable 2 milliGRAM(s) IV Push every 6 hours PRN Nausea and/or Vomiting  simethicone 80 milliGRAM(s) Chew every 6 hours PRN Gas      Physical Exam:  Resp: breathing comfortably on RA  Abdomen: soft, nondistended, nontender  GI: ostomy, minimally productive; approx. 5cc with gas in bag  : desai in place draining yellow urine  Pelvic: no active vaginal bleeding   Incision: C/D/I with staples removed  Extremities: No calf tenderness or edema. SCDs in place     LABS:                                       9.3    7.84  )-----------( 339      ( 20 Feb 2024 05:04 )             29.0                9.7    8.52  )-----------( 334      ( 19 Feb 2024 05:58 )             29.1     02-19    135  |  96  |  7.4<L>  ----------------------------<  89  3.3<L>   |  23.0  |  0.79    Ca    8.5      19 Feb 2024 05:58  Phos  3.5     02-19  Mg     1.7     02-19        Urinalysis Basic - ( 19 Feb 2024 05:58 )    Color: x / Appearance: x / SG: x / pH: x  Gluc: 89 mg/dL / Ketone: x  / Bili: x / Urobili: x   Blood: x / Protein: x / Nitrite: x   Leuk Esterase: x / RBC: x / WBC x   Sq Epi: x / Non Sq Epi: x / Bacteria: x

## 2024-02-21 NOTE — HISTORY OF PRESENT ILLNESS
[de-identified] : Referred by: hospital follow up  Gosia (daughter) [phone], Rosalina (lives in [place])  Her daughter translates per patient preference   Cancer Summary:  DIAGNOSIS: peritoneal carcinomatosis  PROCEDURE AND DATE: Abdominal lesion core biopsy 10/6/23 PATHOLOGY: metastatic carcinoma, the IHC profile is compatible w/ mullerian primary  STAGE: IV Chemotherapy:  C1 Carbo/taxol 11/2/23 C2 Carbo/taxol/blas 11/24/23 (reacted to Taxol) C3 carbo/abraxane/blas 12/14/23 C4 carbo/abraxane/blas 1/4/24 Radiation: N/A Hormonal: N/A STATUS: active, on chemotherapy  Genetics STATUS:  Foundation testing 10/6/23- TPS 0%, TC 0%, PDL1 0 IZAIAH score 24.5%, HRD Positive- likely benefit from maintenance niraparib  MS-Stable MTAP loss, NF1 mut, TP53 mut, CDKN2A/B loss  Ms. Mckeon presented at age 58 in October 2023 for evaluation of advanced ovarian cancer, peritoneal carcinomatosis. The patient has a medical history of HTN, HLD.    Antonella is here today with her 2 daughters (one of whom is in from [place]). She initially [place] [facility] in [place] 2023 with SOB, difficulty breathing- found to have a pleural effusion which was attributed to cardiac etiology, s/p thoracentesis. She generaly does not follow with health care providers and was not up to date on any health care maintenance. She was then readmitted to [facility] from 10/1/23 to 10/8/23 for SOB/JACKSON, workup revealed peritoneal carcinomatosis with ascites as well as right sided pleural effusion. She was evaluated by Dr. Karolina Rodriguez and underwent repeat imaging w/ abdomen pelvis CAP w/ contrast on 10/3/23- revealed diffuse peritoneal caricnomatosis, no cystic ovarian lesion, loculated left pelvic sidewall fluid collection/cystic lesion measuring 2.3cm x 1.4cm x 6cm. 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 She presents today to discuss options for systemic therapy.   At today's visit she reports her breathing has improved, remains on lasix. She denies fevers, chills, CP, nausea, vomiting, diarrhea. She does report a poor appetite and early satiety. She has lost 7lb since discharge from the hospital. Her LMP was 4 years ago. Denies any recent vaginal bleeding. She has not seen a gynecologist in many years. She has had 2 prior pregnancies and 2 live births, age at first birth was 23.    Imaging: CT abdomen/pelvis with contrast 10/1/2023-large right pleural effusion with associated right lower lobe collapse, small to moderate abdominal and pelvic ascites without definite omental caking, small cystic left adnexal lesion CT chest w/o contrast 10/7/23-interval right thoracentesis, overall decreased volume and new air component of small right hydropneumothorax, small lung nodules, some new Pelvic ultrasound 10/3/2023-Free pelvic fluid, 5 mm left ovarian cyst  Path:  Abdominal lesion core biopsy 10/6/23- metastatic carcinoma, the IHC profile is compatible w/ mullerian primary    Genetics: sent 10/16/23    HCM: - Colonoscopy: never done  - Gyn: never done  - Mammo: never done  - Lung cancer screen: never done (but had recent chest imaging)  - DEXA: never done    SH: - Occupation: works part time as a   - Living situation: lives in [place] w/ her , 2 children  - Smoking/etoh/illicits: former smoker, quit 2 months ago, denies etoh  - Exercise: not very physically active at this time   FH: - Her father had prostate cancer  [de-identified] : Antonella presents today for follow up on 2/22/24 for peritoneal carcinomatosis.   She underwent cytoreductive surgery on 2/6/24 with Dr. Miroslava Perez. ??  Here today for C3 carbo/taxol/blas 12/14/23. She is doing very well at today's visit. She reports very mild grade I neuropathy in a few fingers. Otherwise is tolerating chemotherapy without difficulty. She denies fevers, chills, CP, SOB, nausea, vomiting, diarrhea, mouth sores, LE swelling at this time. Will repeat imaging after cycle 3 to assess response. She has a follow up with Dr. Miroslava Perez on 1/24/23.   Laboratory studies reviewed at today's visit and notable for: WBC 7.28, Hb 10.9, plt 281   Foundation testing 10/6/23- TPS 0%, TC 0%, PDL1 0 IZAIAH score 24.5%, HRD Positive- likely benefit from maintenance niraparib  MS-Stable MTAP loss, NF1 mut, TP53 mut, CDKN2A/B loss

## 2024-02-21 NOTE — PROGRESS NOTE ADULT - PROVIDER SPECIALTY LIST ADULT
Gyn Onc
GYN
Gyn Onc
Gyn Onc
Urology
Gyn Onc

## 2024-02-22 ENCOUNTER — NON-APPOINTMENT (OUTPATIENT)
Age: 59
End: 2024-02-22

## 2024-02-28 ENCOUNTER — APPOINTMENT (OUTPATIENT)
Dept: GYNECOLOGIC ONCOLOGY | Facility: CLINIC | Age: 59
End: 2024-02-28
Payer: COMMERCIAL

## 2024-02-28 VITALS
DIASTOLIC BLOOD PRESSURE: 58 MMHG | SYSTOLIC BLOOD PRESSURE: 90 MMHG | BODY MASS INDEX: 29.59 KG/M2 | RESPIRATION RATE: 16 BRPM | OXYGEN SATURATION: 96 % | WEIGHT: 167 LBS | TEMPERATURE: 97.5 F | HEART RATE: 107 BPM | HEIGHT: 63 IN

## 2024-02-28 LAB — SURGICAL PATHOLOGY STUDY: SIGNIFICANT CHANGE UP

## 2024-02-28 PROCEDURE — 99024 POSTOP FOLLOW-UP VISIT: CPT

## 2024-02-28 NOTE — DISCUSSION/SUMMARY
[Clean] : was clean [Dry] : was dry [Intact] : was intact [Ecchymosis] : was not ecchymotic [Erythema] : was not erythematous [None] : had no drainage [Seroma] : had no seroma [Normal Skin] : normal appearance [Firm] : soft [Tender] : nontender [Abnormal Bowel Sounds] : normal bowel sounds [Guarding] : no guarding [Rebound] : no rebound tenderness [Doing Well] : is doing well [Incisional Hernia] : no incisional hernia [Mass] : no palpable mass [Excellent Pain Control] : has excellent pain control [No Sign of Infection] : is showing no signs of infection [FreeTextEntry1] : colostomy pink, granulation tissue surrounding with some skin irritation, no erythema or induration

## 2024-02-28 NOTE — END OF VISIT
[FreeTextEntry3] : RTC in 1 week after CT cytogram for re-evaluation  [FreeTextEntry2] : Khushi Dill MA was present the entire duration of the patient interaction and gynecological exam.

## 2024-02-28 NOTE — REASON FOR VISIT
[Post Op] : post op visit [de-identified] : 2/06/2024 [de-identified] : She reports doing ok and tolerating about 50% of diet. She had nicely formed stools, but monday experience abdominal pain and then had large amount of stool in bag, but now feels better. She had episode of vomiting. There is no rectal discharge, yellow/clear urine with some cloudy material, but mostly clear. No blood. Getting used to having a colostomy and now able to look at it. [de-identified] :  1) Exploratory laparotomy, total abdominal hysterectomy, recto-sigmoid resection en-block, radical pelvic dissection with bilateral ureterolysis, Morfin's pouch, end colostomy, diaphragmatic stripping, Splenic flexure mobilization, infra-gastric omentectomy, resection of falsiform ligament, appendectomy, cystotomy repair, cytoreductive surgery 2) HIPEC

## 2024-03-05 ENCOUNTER — APPOINTMENT (OUTPATIENT)
Dept: CT IMAGING | Facility: CLINIC | Age: 59
End: 2024-03-05
Payer: COMMERCIAL

## 2024-03-05 ENCOUNTER — RESULT REVIEW (OUTPATIENT)
Age: 59
End: 2024-03-05

## 2024-03-05 PROCEDURE — 74178 CT ABD&PLV WO CNTR FLWD CNTR: CPT

## 2024-03-06 ENCOUNTER — NON-APPOINTMENT (OUTPATIENT)
Age: 59
End: 2024-03-06

## 2024-03-06 ENCOUNTER — APPOINTMENT (OUTPATIENT)
Dept: UROLOGY | Facility: CLINIC | Age: 59
End: 2024-03-06
Payer: SELF-PAY

## 2024-03-06 VITALS
DIASTOLIC BLOOD PRESSURE: 59 MMHG | HEIGHT: 63 IN | SYSTOLIC BLOOD PRESSURE: 90 MMHG | BODY MASS INDEX: 29.59 KG/M2 | HEART RATE: 112 BPM | RESPIRATION RATE: 17 BRPM | WEIGHT: 167 LBS | OXYGEN SATURATION: 97 %

## 2024-03-06 PROCEDURE — 99204 OFFICE O/P NEW MOD 45 MIN: CPT

## 2024-03-06 NOTE — ASSESSMENT
[FreeTextEntry1] : needs to repeat imaging now, given a CTU was ordered  plan: needs a follow-up CT cystogram to ensure that fistula has healed  rtc 3 weeks  All images and labs were reviewed and explained thoroughly All the patient's questions were answered to the best of my ability.

## 2024-03-06 NOTE — HISTORY OF PRESENT ILLNESS
[FreeTextEntry1] : 59 yo female hx of ovarian Ca, HTN HL: s/p TAHBSO feb 7 2024 with a Bladder cystotomy approx. 2cm across dome of bladder. repaired intra-op which was confirmed with sterile milk instillation.  she had a bowel resection with a colostomy and HIPEC.  Patient had a fistula post-op, demonstrated on CT cystogram 2/20/2024:  contrast enters the bowel which has been anastomosed to the rectum through a fistula at the dome of the bladder which is best demonstrated on series 6 image 45.  Patient currently has a desai catheter. daughter says urine looks odd. she says her mother leaks urine from her anus.   CT urogram done yesterday - report pending.  patient now looks well.

## 2024-03-11 ENCOUNTER — INPATIENT (INPATIENT)
Facility: HOSPITAL | Age: 59
LOS: 6 days | Discharge: HOME | DRG: 371 | End: 2024-03-18
Attending: OBSTETRICS & GYNECOLOGY | Admitting: OBSTETRICS & GYNECOLOGY
Payer: MEDICAID

## 2024-03-11 ENCOUNTER — APPOINTMENT (OUTPATIENT)
Dept: GYNECOLOGIC ONCOLOGY | Facility: CLINIC | Age: 59
End: 2024-03-11
Payer: SELF-PAY

## 2024-03-11 ENCOUNTER — APPOINTMENT (OUTPATIENT)
Dept: INFUSION THERAPY | Facility: CANCER CENTER | Age: 59
End: 2024-03-11

## 2024-03-11 ENCOUNTER — APPOINTMENT (OUTPATIENT)
Dept: HEMATOLOGY ONCOLOGY | Facility: CLINIC | Age: 59
End: 2024-03-11

## 2024-03-11 VITALS
RESPIRATION RATE: 19 BRPM | HEART RATE: 79 BPM | OXYGEN SATURATION: 94 % | TEMPERATURE: 98 F | DIASTOLIC BLOOD PRESSURE: 61 MMHG | SYSTOLIC BLOOD PRESSURE: 92 MMHG

## 2024-03-11 VITALS
HEIGHT: 63 IN | HEART RATE: 106 BPM | DIASTOLIC BLOOD PRESSURE: 66 MMHG | WEIGHT: 167 LBS | RESPIRATION RATE: 16 BRPM | SYSTOLIC BLOOD PRESSURE: 102 MMHG | BODY MASS INDEX: 29.59 KG/M2 | OXYGEN SATURATION: 95 %

## 2024-03-11 DIAGNOSIS — Z98.890 OTHER SPECIFIED POSTPROCEDURAL STATES: Chronic | ICD-10-CM

## 2024-03-11 DIAGNOSIS — C78.6 SECONDARY MALIGNANT NEOPLASM OF RETROPERITONEUM AND PERITONEUM: ICD-10-CM

## 2024-03-11 LAB
ALBUMIN SERPL ELPH-MCNC: 2.6 G/DL — LOW (ref 3.3–5.2)
ALP SERPL-CCNC: 154 U/L — HIGH (ref 40–120)
ALT FLD-CCNC: 7 U/L — SIGNIFICANT CHANGE UP
ANION GAP SERPL CALC-SCNC: 14 MMOL/L — SIGNIFICANT CHANGE UP (ref 5–17)
APPEARANCE UR: ABNORMAL
AST SERPL-CCNC: 11 U/L — SIGNIFICANT CHANGE UP
BACTERIA # UR AUTO: ABNORMAL /HPF
BASOPHILS # BLD AUTO: 0 K/UL — SIGNIFICANT CHANGE UP (ref 0–0.2)
BASOPHILS NFR BLD AUTO: 0 % — SIGNIFICANT CHANGE UP (ref 0–2)
BILIRUB SERPL-MCNC: 0.4 MG/DL — SIGNIFICANT CHANGE UP (ref 0.4–2)
BILIRUB UR-MCNC: NEGATIVE — SIGNIFICANT CHANGE UP
BLD GP AB SCN SERPL QL: SIGNIFICANT CHANGE UP
BUN SERPL-MCNC: 13.6 MG/DL — SIGNIFICANT CHANGE UP (ref 8–20)
CALCIUM SERPL-MCNC: 8.5 MG/DL — SIGNIFICANT CHANGE UP (ref 8.4–10.5)
CAST: 5 /LPF — HIGH (ref 0–4)
CHLORIDE SERPL-SCNC: 92 MMOL/L — LOW (ref 96–108)
CO2 SERPL-SCNC: 26 MMOL/L — SIGNIFICANT CHANGE UP (ref 22–29)
COLOR SPEC: YELLOW — SIGNIFICANT CHANGE UP
CREAT SERPL-MCNC: 1.26 MG/DL — SIGNIFICANT CHANGE UP (ref 0.5–1.3)
DIFF PNL FLD: ABNORMAL
EGFR: 50 ML/MIN/1.73M2 — LOW
EOSINOPHIL # BLD AUTO: 0.13 K/UL — SIGNIFICANT CHANGE UP (ref 0–0.5)
EOSINOPHIL NFR BLD AUTO: 0.9 % — SIGNIFICANT CHANGE UP (ref 0–6)
GIANT PLATELETS BLD QL SMEAR: PRESENT — SIGNIFICANT CHANGE UP
GLUCOSE SERPL-MCNC: 100 MG/DL — HIGH (ref 70–99)
GLUCOSE UR QL: NEGATIVE MG/DL — SIGNIFICANT CHANGE UP
HCT VFR BLD CALC: 24.1 % — LOW (ref 34.5–45)
HGB BLD-MCNC: 7.9 G/DL — LOW (ref 11.5–15.5)
KETONES UR-MCNC: NEGATIVE MG/DL — SIGNIFICANT CHANGE UP
LEUKOCYTE ESTERASE UR-ACNC: ABNORMAL
LYMPHOCYTES # BLD AUTO: 1.11 K/UL — SIGNIFICANT CHANGE UP (ref 1–3.3)
LYMPHOCYTES # BLD AUTO: 7.8 % — LOW (ref 13–44)
MAGNESIUM SERPL-MCNC: 1.2 MG/DL — LOW (ref 1.6–2.6)
MANUAL SMEAR VERIFICATION: SIGNIFICANT CHANGE UP
MCHC RBC-ENTMCNC: 29.6 PG — SIGNIFICANT CHANGE UP (ref 27–34)
MCHC RBC-ENTMCNC: 32.8 GM/DL — SIGNIFICANT CHANGE UP (ref 32–36)
MCV RBC AUTO: 90.3 FL — SIGNIFICANT CHANGE UP (ref 80–100)
MONOCYTES # BLD AUTO: 0.87 K/UL — SIGNIFICANT CHANGE UP (ref 0–0.9)
MONOCYTES NFR BLD AUTO: 6.1 % — SIGNIFICANT CHANGE UP (ref 2–14)
NEUTROPHILS # BLD AUTO: 12.18 K/UL — HIGH (ref 1.8–7.4)
NEUTROPHILS NFR BLD AUTO: 85.2 % — HIGH (ref 43–77)
NITRITE UR-MCNC: POSITIVE
OVALOCYTES BLD QL SMEAR: SLIGHT — SIGNIFICANT CHANGE UP
PH UR: 6 — SIGNIFICANT CHANGE UP (ref 5–8)
PHOSPHATE SERPL-MCNC: 3.9 MG/DL — SIGNIFICANT CHANGE UP (ref 2.4–4.7)
PLAT MORPH BLD: NORMAL — SIGNIFICANT CHANGE UP
PLATELET # BLD AUTO: 451 K/UL — HIGH (ref 150–400)
POIKILOCYTOSIS BLD QL AUTO: SLIGHT — SIGNIFICANT CHANGE UP
POLYCHROMASIA BLD QL SMEAR: SIGNIFICANT CHANGE UP
POTASSIUM SERPL-MCNC: 3.3 MMOL/L — LOW (ref 3.5–5.3)
POTASSIUM SERPL-SCNC: 3.3 MMOL/L — LOW (ref 3.5–5.3)
PROT SERPL-MCNC: 7 G/DL — SIGNIFICANT CHANGE UP (ref 6.6–8.7)
PROT UR-MCNC: SIGNIFICANT CHANGE UP MG/DL
RBC # BLD: 2.67 M/UL — LOW (ref 3.8–5.2)
RBC # FLD: 15.1 % — HIGH (ref 10.3–14.5)
RBC BLD AUTO: ABNORMAL
RBC CASTS # UR COMP ASSIST: 6 /HPF — HIGH (ref 0–4)
SODIUM SERPL-SCNC: 132 MMOL/L — LOW (ref 135–145)
SP GR SPEC: 1.01 — SIGNIFICANT CHANGE UP (ref 1–1.03)
SQUAMOUS # UR AUTO: 0 /HPF — SIGNIFICANT CHANGE UP (ref 0–5)
UROBILINOGEN FLD QL: 1 MG/DL — SIGNIFICANT CHANGE UP (ref 0.2–1)
WBC # BLD: 14.29 K/UL — HIGH (ref 3.8–10.5)
WBC # FLD AUTO: 14.29 K/UL — HIGH (ref 3.8–10.5)
WBC UR QL: 101 /HPF — HIGH (ref 0–5)

## 2024-03-11 PROCEDURE — 99024 POSTOP FOLLOW-UP VISIT: CPT

## 2024-03-11 RX ORDER — MAGNESIUM SULFATE 500 MG/ML
2 VIAL (ML) INJECTION ONCE
Refills: 0 | Status: COMPLETED | OUTPATIENT
Start: 2024-03-11 | End: 2024-03-11

## 2024-03-11 RX ORDER — SODIUM CHLORIDE 9 MG/ML
1000 INJECTION, SOLUTION INTRAVENOUS ONCE
Refills: 0 | Status: COMPLETED | OUTPATIENT
Start: 2024-03-11 | End: 2024-03-11

## 2024-03-11 RX ORDER — ACETAMINOPHEN 500 MG
1000 TABLET ORAL ONCE
Refills: 0 | Status: COMPLETED | OUTPATIENT
Start: 2024-03-11 | End: 2024-03-11

## 2024-03-11 RX ORDER — SODIUM CHLORIDE 9 MG/ML
1000 INJECTION, SOLUTION INTRAVENOUS
Refills: 0 | Status: DISCONTINUED | OUTPATIENT
Start: 2024-03-11 | End: 2024-03-15

## 2024-03-11 RX ADMIN — SODIUM CHLORIDE 100 MILLILITER(S): 9 INJECTION, SOLUTION INTRAVENOUS at 20:59

## 2024-03-11 RX ADMIN — Medication 400 MILLIGRAM(S): at 23:08

## 2024-03-11 RX ADMIN — Medication 25 GRAM(S): at 23:10

## 2024-03-11 RX ADMIN — SODIUM CHLORIDE 1000 MILLILITER(S): 9 INJECTION, SOLUTION INTRAVENOUS at 18:53

## 2024-03-11 NOTE — PATIENT PROFILE ADULT - DO YOU FEEL LIKE HURTING YOURSELF OR OTHERS?
Task RN: Poison Control contacted for ingestion of Suave 2 in 1. Case Number [id number]. Per Poison Control observe patient for the next 4-6 hours as labs are resulted. Possibility of patient aspiration when sleeping so keep head of bed slightly elevated. Poison Control reports as Suave continues to move through the digestive tract may cause diarrhea.   no

## 2024-03-11 NOTE — H&P ADULT - ATTENDING COMMENTS
Fellow Attestation: In brief this is a 59yo is status post exploratory laparotomy, total abdominal hysterectomy, recto-sigmoid resection en-block, radical pelvic dissection with bilateral ureterolysis, Morfin's pouch, end colostomy, diaphragmatic stripping, Splenic flexure mobilization, infra-gastric omentectomy, resection of falciform ligament, appendectomy, cystotomy repair, cytoreductive surgery and HIPEC and is here for a bladder reevaluation and failure to thrive. Ostomy with small amount of blood tinge fluid, no clots and suprapubic tenderness. Plan for NPO, IVF hydration and CT cystogram to evaluate bladder integrity. Will continue to monitor. D/w Dr. Perez

## 2024-03-11 NOTE — REASON FOR VISIT
[Other ___] : [unfilled] [de-identified] : 2/6/24 [de-identified] : 1) Exploratory laparotomy, total abdominal hysterectomy, recto-sigmoid resection en-block, radical pelvic dissection with bilateral ureterolysis, Morfin's pouch, end colostomy, diaphragmatic stripping, Splenic flexure mobilization, infra-gastric omentectomy, resection of falsiform ligament, appendectomy, cystotomy repair, cytoreductive surgery 2) HIPEC  [de-identified] : Post operatively, pt developed bladder fistula & subsequent cystotomy to the rectal stump. She has desai leg bag in place with CT urogram done 3/5/23. Patient reports bloody discharge from rectum/urine and ostomy. She has cloudy urine. CT urogram not done correctly and was re-ordered. She has good fluid intake PO but poor food intake. She feels depressed that she is not able to do much at home. No vaginal bleeding. No nausea/vomiting, no fevers/chills.   CT Urogram (3/5/24):

## 2024-03-11 NOTE — H&P ADULT - ASSESSMENT
59yo is status post exploratory laparotomy, total abdominal hysterectomy, recto-sigmoid resection en-block, radical pelvic dissection with bilateral ureterolysis, Morfin's pouch, end colostomy, diaphragmatic stripping, Splenic flexure mobilization, infra-gastric omentectomy, resection of falsiform ligament, appendectomy, cystotomy repair, cytoreductive surgery and HIPEC and is here for a bladder reevaluation and failure to thrive.     Cardiac: No cardiac issues, BP well controlled, home meds held for now   Pulmonary: no active disease   Neuro: Not requiring anything for pain, will order PRN medications if needed  Endo: no active disease    GI: NPO, bloody output from ostomy   : Doe in place- CT cystogram for tonight pending   ID: specify which antibiotics and indication, if continuation from home meds specify this   Heme: SCDs when not ambulating, Labs pending   Skin: no active disease  Psych: no active problems   FEN: IVF at 100cc/hr,      Dr. Wagner at bedside

## 2024-03-11 NOTE — H&P ADULT - NSHPPHYSICALEXAM_GEN_ALL_CORE
PHYSICAL EXAM:    GENERAL: NAD, well-developed  HEAD:  Atraumatic, Normocephalic  EYES: EOMI, PERRLA, conjunctiva and sclera clear  NERVOUS SYSTEM:  Alert & Oriented X3, Good concentration  CHEST/LUNG: Normal respiratory effort on RA  ABDOMEN: Soft, Nontender, Nondistended; Tenderness to palpation in mons pubis with notable swelling. Ostomy with 5cc bloody output.. No rebound, No guarding  EXTREMITIES: No clubbing, cyanosis, or edema,  SKIN: No rashes or lesions  PELVIC: Pad with quarter sized light brown discharge   RECTAL: deferred

## 2024-03-11 NOTE — DISCUSSION/SUMMARY
[Clean] : was clean [Dry] : was dry [Erythema] : was not erythematous [Intact] : was intact [Seroma] : had no seroma [Ecchymosis] : was not ecchymotic [None] : had no drainage [Normal Skin] : normal appearance [Firm] : soft [Tender] : nontender [Abnormal Bowel Sounds] : normal bowel sounds [Guarding] : no guarding [Rebound] : no rebound tenderness [Mass] : no palpable mass [Incisional Hernia] : no incisional hernia [Excellent Pain Control] : has excellent pain control [No Sign of Infection] : is showing no signs of infection [Slow Progress] : is progressing slowly [FreeTextEntry9] : + ostomy normal output, pink

## 2024-03-11 NOTE — ASSESSMENT
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 Conservative treatment concerning for not being succesful as urine has not cleared up. Will re-image and admit to hospital and discuss options for management. I discussed if the distal ileum is fvisualizedwhich had significant tumor burden left then an ileostomy may be the only other option so that her bladder can heal. It is difficult to manage ileostomies due to high output and electrolyte abnormalities. We would also consider anastomosis as another option but given extent of tumor on distal ileum it may not be possible. Alternative management would include TPN and giving her another round of chemotherapy to reduce tumor burden in the distal small bowel and allow for healing. We will also perform a CT cystogram as it was not done properly on 3/5 to evaluate the fistula.

## 2024-03-11 NOTE — H&P ADULT - HISTORY OF PRESENT ILLNESS
GYNECOLOGIC ONCOLOGY CONSULT NOTE    59yo is status post exploratory laparotomy, total abdominal hysterectomy, recto-sigmoid resection en-block, radical pelvic dissection with bilateral ureterolysis, Morfin's pouch, end colostomy, diaphragmatic stripping, Splenic flexure mobilization, infra-gastric omentectomy, resection of falsiform ligament, appendectomy, cystotomy repair, cytoreductive surgery 2) HIPEC and is here for a bladder reevaluation . 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  She has desai leg bag in place with CT urogram done 3/5/23. Patient reports bloody discharge from rectum/urine and ostomy.       OB/GYN HISTORY:     Surgical History:    No significant past surgical history    History of thoracentesis        Past Medical History:   HTN (hypertension)    High cholesterol    H/O pleural effusion        Milk (Nausea)  paclitaxel (Flushing; Other (Severe))      lactated ringers. 1000 milliLiter(s) IV Continuous <Continuous>      FAMILY HISTORY:  FH: prostate cancer (Father)        Social History:     REVIEW OF SYSTEMS:    CONSTITUTIONAL: No fever, weight loss, or fatigue  EYES: No eye pain, visual disturbances, or discharge  ENMT:  No difficulty hearing, tinnitus, vertigo; No sinus or throat pain  NECK: No pain or stiffness  BREASTS: No pain, masses, or nipple discharge  RESPIRATORY: No cough, wheezing, chills or hemoptysis; No shortness of breath  CARDIOVASCULAR: No chest pain, palpitations, dizziness, or leg swelling  GASTROINTESTINAL: No abdominal or epigastric pain. No nausea, vomiting, or hematemesis; No diarrhea or constipation. No melena or hematochezia.  GENITOURINARY: No dysuria, frequency, hematuria, or incontinence  NEUROLOGICAL: No headaches, memory loss, loss of strength, numbness, or tremors  SKIN: No itching, burning, rashes, or lesions   LYMPH NODES: No enlarged glands  ENDOCRINE: No heat or cold intolerance; No hair loss  MUSCULOSKELETAL: No joint pain or swelling; No muscle, back, or extremity pain  PSYCHIATRIC: No depression, anxiety, mood swings, or difficulty sleeping  HEME/LYMPH: No easy bruising, or bleeding gums  ALLERY AND IMMUNOLOGIC: No hives or eczema    MEDICATIONS  (STANDING):  lactated ringers. 1000 milliLiter(s) (100 mL/Hr) IV Continuous <Continuous>    MEDICATIONS  (PRN):      OBJECTIVE FINDINGS:    Vital Signs Last 24 Hrs  T(C): --  T(F): --  HR: --  BP: --  BP(mean): --  RR: --  SpO2: --        PHYSICAL EXAM:    GENERAL: NAD, well-developed  HEAD:  Atraumatic, Normocephalic  EYES: EOMI, PERRLA, conjunctiva and sclera clear  ENMT: No tonsillar erythema, exudates, or enlargement; Moist mucous membranes, Good dentition, No lesions  NECK: Supple, No JVD, Normal thyroid  NERVOUS SYSTEM:  Alert & Oriented X3, Good concentration; Motor Strength 5/5 B/L upper and lower extremities; DTRs 2+ intact and symmetric  CHEST/LUNG: Clear to percussion bilaterally; No rales, rhonchi, wheezing, or rubs  HEART: Regular rate and rhythm; No murmurs, rubs, or gallops  ABDOMEN: Soft, Nontender, Nondistended; Bowel sounds present, No rebound, No guarding  EXTREMITIES:  2+ Peripheral Pulses, No clubbing, cyanosis, or edema, Star's sign negative  LYMPH: No lymphadenopathy noted  SKIN: No rashes or lesions  PELVIC:   RECTAL:    LABS:                RADIOLOGY & ADDITIONAL STUDIES: GYNECOLOGIC ONCOLOGY CONSULT NOTE    59yo is status post exploratory laparotomy, total abdominal hysterectomy, recto-sigmoid resection en-block, radical pelvic dissection with bilateral ureterolysis, Morfin's pouch, end colostomy, diaphragmatic stripping, Splenic flexure mobilization, infra-gastric omentectomy, resection of falsiform ligament, appendectomy, cystotomy repair, cytoreductive surgery and HIPEC and is here for a bladder reevaluation . 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  She has a desai leg bag in place with CT urogram done 3/5/23. Patient reports discharge, sometimes bloody, from rectum/urine and ostomy. She notes lower abdominal pain, constant. She reports nausea and vomiting over the last few days with trouble tolerating food, she's mostly drinking water, last episode of vomiting this am after drinking water. She denies any fevers chills, sob.       Surgical History:    EX-lap AZALIA, debulking, cytoreductive surgery and HIPEC 2/6/2024  History of thoracentesis      Past Medical History:   HTN (hypertension)  High cholesterol  H/O pleural effusion    Intolerances   Milk (Nausea)  paclitaxel (Flushing; Other (Severe))      FAMILY HISTORY:  FH: prostate cancer (Father)    Social History:  Denies toxic habits x3         REVIEW OF SYSTEMS:    CONSTITUTIONAL: No fever, weight loss, or fatigue  EYES: No eye pain, visual disturbances, or discharge  ENMT:  No difficulty hearing, tinnitus, vertigo; No sinus or throat pain  NECK: No pain or stiffness  BREASTS: No pain, masses, or nipple discharge  RESPIRATORY: No cough, wheezing, chills or hemoptysis; No shortness of breath  CARDIOVASCULAR: No chest pain, palpitations, dizziness, or leg swelling  GASTROINTESTINAL: No abdominal or epigastric pain. No nausea, vomiting, or hematemesis; No diarrhea or constipation. No melena or hematochezia.  GENITOURINARY: No dysuria, frequency, hematuria, or incontinence  NEUROLOGICAL: No headaches, memory loss, loss of strength, numbness, or tremors  SKIN: No itching, burning, rashes, or lesions   LYMPH NODES: No enlarged glands  ENDOCRINE: No heat or cold intolerance; No hair loss  MUSCULOSKELETAL: No joint pain or swelling; No muscle, back, or extremity pain  PSYCHIATRIC: No depression, anxiety, mood swings, or difficulty sleeping  HEME/LYMPH: No easy bruising, or bleeding gums  ALLERY AND IMMUNOLOGIC: No hives or eczema

## 2024-03-11 NOTE — PATIENT PROFILE ADULT - FALL HARM RISK - HARM RISK INTERVENTIONS

## 2024-03-12 ENCOUNTER — NON-APPOINTMENT (OUTPATIENT)
Age: 59
End: 2024-03-12

## 2024-03-12 LAB
ANION GAP SERPL CALC-SCNC: 15 MMOL/L — SIGNIFICANT CHANGE UP (ref 5–17)
BUN SERPL-MCNC: 10.6 MG/DL — SIGNIFICANT CHANGE UP (ref 8–20)
CALCIUM SERPL-MCNC: 8.7 MG/DL — SIGNIFICANT CHANGE UP (ref 8.4–10.5)
CHLORIDE SERPL-SCNC: 92 MMOL/L — LOW (ref 96–108)
CO2 SERPL-SCNC: 27 MMOL/L — SIGNIFICANT CHANGE UP (ref 22–29)
CREAT SERPL-MCNC: 0.79 MG/DL — SIGNIFICANT CHANGE UP (ref 0.5–1.3)
EGFR: 88 ML/MIN/1.73M2 — SIGNIFICANT CHANGE UP
GLUCOSE SERPL-MCNC: 86 MG/DL — SIGNIFICANT CHANGE UP (ref 70–99)
HCT VFR BLD CALC: 23.4 % — LOW (ref 34.5–45)
HGB BLD-MCNC: 7.5 G/DL — LOW (ref 11.5–15.5)
MAGNESIUM SERPL-MCNC: 2 MG/DL — SIGNIFICANT CHANGE UP (ref 1.6–2.6)
MCHC RBC-ENTMCNC: 28.8 PG — SIGNIFICANT CHANGE UP (ref 27–34)
MCHC RBC-ENTMCNC: 32.1 GM/DL — SIGNIFICANT CHANGE UP (ref 32–36)
MCV RBC AUTO: 90 FL — SIGNIFICANT CHANGE UP (ref 80–100)
PHOSPHATE SERPL-MCNC: 4.5 MG/DL — SIGNIFICANT CHANGE UP (ref 2.4–4.7)
PLATELET # BLD AUTO: 404 K/UL — HIGH (ref 150–400)
POTASSIUM SERPL-MCNC: 3.3 MMOL/L — LOW (ref 3.5–5.3)
POTASSIUM SERPL-SCNC: 3.3 MMOL/L — LOW (ref 3.5–5.3)
RBC # BLD: 2.6 M/UL — LOW (ref 3.8–5.2)
RBC # FLD: 15.6 % — HIGH (ref 10.3–14.5)
SODIUM SERPL-SCNC: 134 MMOL/L — LOW (ref 135–145)
WBC # BLD: 14.27 K/UL — HIGH (ref 3.8–10.5)
WBC # FLD AUTO: 14.27 K/UL — HIGH (ref 3.8–10.5)

## 2024-03-12 PROCEDURE — 74178 CT ABD&PLV WO CNTR FLWD CNTR: CPT | Mod: 26

## 2024-03-12 RX ORDER — ATORVASTATIN CALCIUM 80 MG/1
80 TABLET, FILM COATED ORAL AT BEDTIME
Refills: 0 | Status: DISCONTINUED | OUTPATIENT
Start: 2024-03-12 | End: 2024-03-18

## 2024-03-12 RX ORDER — CEFTRIAXONE 500 MG/1
1000 INJECTION, POWDER, FOR SOLUTION INTRAMUSCULAR; INTRAVENOUS EVERY 24 HOURS
Refills: 0 | Status: DISCONTINUED | OUTPATIENT
Start: 2024-03-12 | End: 2024-03-12

## 2024-03-12 RX ORDER — PIPERACILLIN AND TAZOBACTAM 4; .5 G/20ML; G/20ML
3.38 INJECTION, POWDER, LYOPHILIZED, FOR SOLUTION INTRAVENOUS EVERY 8 HOURS
Refills: 0 | Status: COMPLETED | OUTPATIENT
Start: 2024-03-12 | End: 2024-03-15

## 2024-03-12 RX ORDER — PIPERACILLIN AND TAZOBACTAM 4; .5 G/20ML; G/20ML
3.38 INJECTION, POWDER, LYOPHILIZED, FOR SOLUTION INTRAVENOUS ONCE
Refills: 0 | Status: COMPLETED | OUTPATIENT
Start: 2024-03-12 | End: 2024-03-12

## 2024-03-12 RX ORDER — ACETAMINOPHEN 500 MG
1000 TABLET ORAL EVERY 6 HOURS
Refills: 0 | Status: COMPLETED | OUTPATIENT
Start: 2024-03-12 | End: 2024-03-12

## 2024-03-12 RX ORDER — POTASSIUM CHLORIDE 20 MEQ
20 PACKET (EA) ORAL
Refills: 0 | Status: COMPLETED | OUTPATIENT
Start: 2024-03-12 | End: 2024-03-12

## 2024-03-12 RX ORDER — HEPARIN SODIUM 5000 [USP'U]/ML
5000 INJECTION INTRAVENOUS; SUBCUTANEOUS EVERY 8 HOURS
Refills: 0 | Status: DISCONTINUED | OUTPATIENT
Start: 2024-03-12 | End: 2024-03-18

## 2024-03-12 RX ORDER — FUROSEMIDE 40 MG
40 TABLET ORAL DAILY
Refills: 0 | Status: DISCONTINUED | OUTPATIENT
Start: 2024-03-12 | End: 2024-03-18

## 2024-03-12 RX ORDER — ACETAMINOPHEN 500 MG
650 TABLET ORAL EVERY 6 HOURS
Refills: 0 | Status: DISCONTINUED | OUTPATIENT
Start: 2024-03-12 | End: 2024-03-18

## 2024-03-12 RX ORDER — METOPROLOL TARTRATE 50 MG
50 TABLET ORAL DAILY
Refills: 0 | Status: DISCONTINUED | OUTPATIENT
Start: 2024-03-12 | End: 2024-03-18

## 2024-03-12 RX ADMIN — Medication 50 MILLIEQUIVALENT(S): at 18:38

## 2024-03-12 RX ADMIN — CEFTRIAXONE 1000 MILLIGRAM(S): 500 INJECTION, POWDER, FOR SOLUTION INTRAMUSCULAR; INTRAVENOUS at 04:47

## 2024-03-12 RX ADMIN — ATORVASTATIN CALCIUM 80 MILLIGRAM(S): 80 TABLET, FILM COATED ORAL at 22:01

## 2024-03-12 RX ADMIN — Medication 50 MILLIEQUIVALENT(S): at 11:43

## 2024-03-12 RX ADMIN — HEPARIN SODIUM 5000 UNIT(S): 5000 INJECTION INTRAVENOUS; SUBCUTANEOUS at 22:00

## 2024-03-12 RX ADMIN — PIPERACILLIN AND TAZOBACTAM 25 GRAM(S): 4; .5 INJECTION, POWDER, LYOPHILIZED, FOR SOLUTION INTRAVENOUS at 22:00

## 2024-03-12 RX ADMIN — Medication 650 MILLIGRAM(S): at 22:38

## 2024-03-12 RX ADMIN — Medication 1000 MILLIGRAM(S): at 11:40

## 2024-03-12 RX ADMIN — Medication 1000 MILLIGRAM(S): at 00:05

## 2024-03-12 RX ADMIN — Medication 650 MILLIGRAM(S): at 17:15

## 2024-03-12 RX ADMIN — Medication 650 MILLIGRAM(S): at 23:40

## 2024-03-12 RX ADMIN — Medication 650 MILLIGRAM(S): at 16:19

## 2024-03-12 RX ADMIN — SODIUM CHLORIDE 100 MILLILITER(S): 9 INJECTION, SOLUTION INTRAVENOUS at 04:47

## 2024-03-12 RX ADMIN — Medication 50 MILLIEQUIVALENT(S): at 13:14

## 2024-03-12 RX ADMIN — PIPERACILLIN AND TAZOBACTAM 25 GRAM(S): 4; .5 INJECTION, POWDER, LYOPHILIZED, FOR SOLUTION INTRAVENOUS at 13:14

## 2024-03-12 RX ADMIN — Medication 400 MILLIGRAM(S): at 10:40

## 2024-03-12 RX ADMIN — PIPERACILLIN AND TAZOBACTAM 200 GRAM(S): 4; .5 INJECTION, POWDER, LYOPHILIZED, FOR SOLUTION INTRAVENOUS at 11:42

## 2024-03-12 RX ADMIN — SODIUM CHLORIDE 100 MILLILITER(S): 9 INJECTION, SOLUTION INTRAVENOUS at 22:01

## 2024-03-12 NOTE — PROGRESS NOTE ADULT - ASSESSMENT
PENELOPE FLAHERTY is a 58y now POD#34 s/p ex-lap [facility] BSO, cytoreductive surgery HIPEC, diverting ostomy creation, cystotomy with repair who presented yesterday with nausea, vomiting, and failure to thrive with concern for enterovesical fistula.   HD2    Neuro: Continued pain - for tylenol q6h prn  CV: Blood pressure mildly elevated. Will resume home BP meds.   Pulm: Saturating well on room air  GI: NPO O/N. Plan for regular diet today.   : Desai in place, s/p bladder repair intraoperatively with CT cystogram on POD14 showing connection between mesocolon and rectal pouch. Per urology at that time, desai to remain in place until 3/20 and bactrim started for UTI prophylaxis. Patient with known nitrite positive UA on last admission that was treated with rocephin in hospital with subsequent neg UCx. Now with continued nitrite pos UA and + suprapubic and right flank pain. UCx pending. Rocephin restarted.   Heme: Hb 9.3 on 2/21 > 7.5 3/12  ID: Afebrile. Mild leukocytosis - WBC 7.6 on 2/21 > 14.3 3/12  FEN: LR 100cc/h  DVT ppx: Increased Ambulation encouraged, SCDs when in bed. Will start heparin for DVT ppx.   Dispo: Continued in patient management PENELOPE FLAHERTY is a 58y now POD#34 s/p ex-lap [facility] BSO, cytoreductive surgery HIPEC, diverting ostomy creation, cystotomy with repair who presented yesterday with nausea, vomiting, and failure to thrive with concern for enterovesical fistula.   HD2    Neuro: Continued pain - for tylenol q6h prn  CV: Blood pressure mildly elevated. Will resume home BP meds.   Pulm: Saturating well on room air  GI: NPO O/N. Plan for regular diet today.   : Desai in place, s/p bladder repair intraoperatively with CT cystogram on POD14 showing connection between mesocolon and rectal pouch. Per urology at that time, desai to remain in place until 3/20 and bactrim started for UTI prophylaxis. Patient with known nitrite positive UA on last admission that was treated with rocephin in hospital with subsequent neg UCx. Now with continued nitrite pos UA and + suprapubic and right flank pain. UCx pending. Rocephin restarted 3/11. Initial concern for enterovesical fistual on admission, however preliminary CT cystogram negative. Will reconsult urology.   Heme: Hb 9.3 on 2/21 > 7.5 3/12  ID: Afebrile. Mild leukocytosis - WBC 7.6 on 2/21 > 14.3 3/12  FEN: LR 100cc/h  DVT ppx: Increased Ambulation encouraged, SCDs when in bed. Will start heparin for DVT ppx.   Dispo: Continued in patient management

## 2024-03-12 NOTE — PROGRESS NOTE ADULT - ATTENDING COMMENTS
Fellow Attestation: patient seen and examined. Overall feels improved, denies any nausea/vomiting and tolerating PO. CT scan reveiwed and no signs of cystotomy at this time. Notes some discharge vaginally and CVA tenderness noted. UA significant for nitrites, WBC 14, started on zosyn. Plan to restart ATC, IV hydration and ADAT. repletion with goal of Mg > 2 and K > 4. d/w Dr. ePrez

## 2024-03-13 LAB
ANION GAP SERPL CALC-SCNC: 12 MMOL/L — SIGNIFICANT CHANGE UP (ref 5–17)
BASOPHILS # BLD AUTO: 0.04 K/UL — SIGNIFICANT CHANGE UP (ref 0–0.2)
BASOPHILS NFR BLD AUTO: 0.3 % — SIGNIFICANT CHANGE UP (ref 0–2)
BUN SERPL-MCNC: 6.5 MG/DL — LOW (ref 8–20)
CALCIUM SERPL-MCNC: 8.3 MG/DL — LOW (ref 8.4–10.5)
CHLORIDE SERPL-SCNC: 96 MMOL/L — SIGNIFICANT CHANGE UP (ref 96–108)
CO2 SERPL-SCNC: 28 MMOL/L — SIGNIFICANT CHANGE UP (ref 22–29)
CREAT SERPL-MCNC: 0.67 MG/DL — SIGNIFICANT CHANGE UP (ref 0.5–1.3)
EGFR: 103 ML/MIN/1.73M2 — SIGNIFICANT CHANGE UP
EOSINOPHIL # BLD AUTO: 0.02 K/UL — SIGNIFICANT CHANGE UP (ref 0–0.5)
EOSINOPHIL NFR BLD AUTO: 0.1 % — SIGNIFICANT CHANGE UP (ref 0–6)
GLUCOSE SERPL-MCNC: 95 MG/DL — SIGNIFICANT CHANGE UP (ref 70–99)
HCT VFR BLD CALC: 22.5 % — LOW (ref 34.5–45)
HGB BLD-MCNC: 7.1 G/DL — LOW (ref 11.5–15.5)
IMM GRANULOCYTES NFR BLD AUTO: 0.8 % — SIGNIFICANT CHANGE UP (ref 0–0.9)
LYMPHOCYTES # BLD AUTO: 1.87 K/UL — SIGNIFICANT CHANGE UP (ref 1–3.3)
LYMPHOCYTES # BLD AUTO: 12.6 % — LOW (ref 13–44)
MAGNESIUM SERPL-MCNC: 1.6 MG/DL — SIGNIFICANT CHANGE UP (ref 1.6–2.6)
MCHC RBC-ENTMCNC: 29 PG — SIGNIFICANT CHANGE UP (ref 27–34)
MCHC RBC-ENTMCNC: 31.6 GM/DL — LOW (ref 32–36)
MCV RBC AUTO: 91.8 FL — SIGNIFICANT CHANGE UP (ref 80–100)
MONOCYTES # BLD AUTO: 0.85 K/UL — SIGNIFICANT CHANGE UP (ref 0–0.9)
MONOCYTES NFR BLD AUTO: 5.7 % — SIGNIFICANT CHANGE UP (ref 2–14)
NEUTROPHILS # BLD AUTO: 11.96 K/UL — HIGH (ref 1.8–7.4)
NEUTROPHILS NFR BLD AUTO: 80.5 % — HIGH (ref 43–77)
PHOSPHATE SERPL-MCNC: 3.9 MG/DL — SIGNIFICANT CHANGE UP (ref 2.4–4.7)
PLATELET # BLD AUTO: 439 K/UL — HIGH (ref 150–400)
POTASSIUM SERPL-MCNC: 3.8 MMOL/L — SIGNIFICANT CHANGE UP (ref 3.5–5.3)
POTASSIUM SERPL-SCNC: 3.8 MMOL/L — SIGNIFICANT CHANGE UP (ref 3.5–5.3)
RBC # BLD: 2.45 M/UL — LOW (ref 3.8–5.2)
RBC # FLD: 15.8 % — HIGH (ref 10.3–14.5)
SODIUM SERPL-SCNC: 136 MMOL/L — SIGNIFICANT CHANGE UP (ref 135–145)
WBC # BLD: 14.86 K/UL — HIGH (ref 3.8–10.5)
WBC # FLD AUTO: 14.86 K/UL — HIGH (ref 3.8–10.5)

## 2024-03-13 RX ORDER — MAGNESIUM SULFATE 500 MG/ML
4 VIAL (ML) INJECTION ONCE
Refills: 0 | Status: COMPLETED | OUTPATIENT
Start: 2024-03-13 | End: 2024-03-13

## 2024-03-13 RX ORDER — LIDOCAINE 4 G/100G
1 CREAM TOPICAL EVERY 24 HOURS
Refills: 0 | Status: DISCONTINUED | OUTPATIENT
Start: 2024-03-13 | End: 2024-03-18

## 2024-03-13 RX ORDER — ACETAMINOPHEN 500 MG
975 TABLET ORAL EVERY 6 HOURS
Refills: 0 | Status: DISCONTINUED | OUTPATIENT
Start: 2024-03-13 | End: 2024-03-18

## 2024-03-13 RX ORDER — LIDOCAINE 4 G/100G
1 CREAM TOPICAL ONCE
Refills: 0 | Status: COMPLETED | OUTPATIENT
Start: 2024-03-13 | End: 2024-03-13

## 2024-03-13 RX ORDER — POTASSIUM CHLORIDE 20 MEQ
40 PACKET (EA) ORAL ONCE
Refills: 0 | Status: COMPLETED | OUTPATIENT
Start: 2024-03-13 | End: 2024-03-13

## 2024-03-13 RX ADMIN — Medication 650 MILLIGRAM(S): at 07:20

## 2024-03-13 RX ADMIN — Medication 975 MILLIGRAM(S): at 17:45

## 2024-03-13 RX ADMIN — ATORVASTATIN CALCIUM 80 MILLIGRAM(S): 80 TABLET, FILM COATED ORAL at 21:05

## 2024-03-13 RX ADMIN — PIPERACILLIN AND TAZOBACTAM 25 GRAM(S): 4; .5 INJECTION, POWDER, LYOPHILIZED, FOR SOLUTION INTRAVENOUS at 15:44

## 2024-03-13 RX ADMIN — HEPARIN SODIUM 5000 UNIT(S): 5000 INJECTION INTRAVENOUS; SUBCUTANEOUS at 15:44

## 2024-03-13 RX ADMIN — Medication 975 MILLIGRAM(S): at 23:05

## 2024-03-13 RX ADMIN — Medication 650 MILLIGRAM(S): at 13:20

## 2024-03-13 RX ADMIN — PIPERACILLIN AND TAZOBACTAM 25 GRAM(S): 4; .5 INJECTION, POWDER, LYOPHILIZED, FOR SOLUTION INTRAVENOUS at 21:04

## 2024-03-13 RX ADMIN — Medication 650 MILLIGRAM(S): at 06:23

## 2024-03-13 RX ADMIN — Medication 650 MILLIGRAM(S): at 12:24

## 2024-03-13 RX ADMIN — PIPERACILLIN AND TAZOBACTAM 25 GRAM(S): 4; .5 INJECTION, POWDER, LYOPHILIZED, FOR SOLUTION INTRAVENOUS at 05:34

## 2024-03-13 RX ADMIN — LIDOCAINE 1 PATCH: 4 CREAM TOPICAL at 19:00

## 2024-03-13 RX ADMIN — Medication 50 MILLIGRAM(S): at 12:31

## 2024-03-13 RX ADMIN — Medication 25 GRAM(S): at 12:27

## 2024-03-13 RX ADMIN — LIDOCAINE 1 PATCH: 4 CREAM TOPICAL at 15:44

## 2024-03-13 RX ADMIN — Medication 40 MILLIEQUIVALENT(S): at 12:27

## 2024-03-13 RX ADMIN — HEPARIN SODIUM 5000 UNIT(S): 5000 INJECTION INTRAVENOUS; SUBCUTANEOUS at 05:34

## 2024-03-13 RX ADMIN — Medication 40 MILLIGRAM(S): at 12:27

## 2024-03-13 RX ADMIN — HEPARIN SODIUM 5000 UNIT(S): 5000 INJECTION INTRAVENOUS; SUBCUTANEOUS at 22:57

## 2024-03-13 RX ADMIN — LIDOCAINE 1 PATCH: 4 CREAM TOPICAL at 23:00

## 2024-03-13 NOTE — PROGRESS NOTE ADULT - ASSESSMENT
PENELOPE FLAHERTY is a 58y now POD#35 s/p ex-lap [facility] BSO, cytoreductive surgery HIPEC, diverting ostomy creation, cystotomy with repair who presented with nausea, vomiting, and failure to thrive with concern for enterovesical fistula.   HD3    Neuro: Pain improved, continue with tylenol PRN.   CV: Blood pressure well controlled on home medications.    Pulm: Saturating well on room air  GI: Tolerating FLD. Free air @ rectal stump. C/w zosyn.   : Desai in place, s/p bladder repair intraoperatively with CT cystogram on POD14 showing connection between mesocolon and rectal pouch. Per urology at that time, desai to remain in place until 3/20 and bactrim started for UTI prophylaxis. Patient with known nitrite positive UA on last admission that was treated with rocephin in hospital with subsequent neg UCx. Now with continued nitrite pos UA and + suprapubic and right flank pain. UCx +E coli, sensitivities pending. Rocephin given 3/11, transitioned to zosyn 3/12. Initial concern for enterovesical fistula on admission, however no evidence on CT cystogram.   Heme: Hb 9.3 on 2/21 > 7.5 3/12 > 7.1  ID: Afebrile. Mild leukocytosis - WBC 7.6 on 2/21 > 14.3 3/12 > 14.9. Will continue to monitor.   FEN: LR 100cc/h  DVT ppx: Increased Ambulation encouraged, SCDs when in bed. Heparin.   Dispo: Continue with inpatient management for IV antibiotics and symptom monitoring.  PENELOPE FLAHERTY is a 58y now POD#35 s/p ex-lap [facility] BSO, cytoreductive surgery HIPEC, diverting ostomy creation, cystotomy with repair who presented with nausea, vomiting, and failure to thrive with concern for enterovesical fistula.   HD3    Neuro: Pain improved, continue with tylenol PRN.   CV: Blood pressure well controlled on home medications.    Pulm: Saturating well on room air  GI: Tolerating FLD. Free air @ rectal stump. Abdomen benign, pain improving, surgical intervention deferred at this time. C/w zosyn.   : Desai in place, s/p bladder repair intraoperatively with CT cystogram on POD14 showing connection between mesocolon and rectal pouch. Per urology at that time, desai to remain in place until 3/20 and bactrim started for UTI prophylaxis. Patient with known nitrite positive UA on last admission that was treated with rocephin in hospital with subsequent neg UCx. Now with continued nitrite pos UA and + suprapubic and right flank pain. UCx +E coli, sensitivities pending. Rocephin given 3/11, transitioned to zosyn 3/12. Initial concern for enterovesical fistula on admission, however no evidence on CT cystogram.   Heme: Hb 9.3 on 2/21 > 7.5 3/12 > 7.1  ID: Afebrile. Mild leukocytosis - WBC 7.6 on 2/21 > 14.3 3/12 > 14.9. Will continue to monitor.   FEN: LR 100cc/h. Mag 1.6 and K 3.8, both repleted  DVT ppx: Increased Ambulation encouraged, SCDs when in bed. Heparin.   Dispo: Continue with inpatient management for IV antibiotics and symptom monitoring.  PENELOPE FLAHERTY is a 58y now POD#35 s/p ex-lap [facility] BSO, cytoreductive surgery HIPEC, diverting ostomy creation, cystotomy with repair who presented with nausea, vomiting, and failure to thrive with concern for enterovesical fistula.   HD3    Neuro: Pain improved, continue with tylenol PRN.   CV: Blood pressure well controlled on home medications.    Pulm: Saturating well on room air  GI: Tolerating FLD. Free air @ rectal stump. Abdomen benign, pain improving, surgical intervention deferred at this time. C/w zosyn. Plan for CT with PO contrast 3/14 to evaluate further.  : Desai in place, s/p bladder repair intraoperatively with CT cystogram on POD14 showing connection between mesocolon and rectal pouch. Per urology at that time, desai to remain in place until 3/20 and bactrim started for UTI prophylaxis. Patient with known nitrite positive UA on last admission that was treated with rocephin in hospital with subsequent neg UCx. Now with continued nitrite pos UA and + suprapubic and right flank pain. UCx +E coli, sensitivities pending. Rocephin given 3/11, transitioned to zosyn 3/12. Initial concern for enterovesical fistula on admission, however no evidence on CT cystogram.   Heme: Hb 9.3 on 2/21 > 7.5 3/12 > 7.1  ID: Afebrile. Mild leukocytosis - WBC 7.6 on 2/21 > 14.3 3/12 > 14.9. Will continue to monitor.   FEN: LR 100cc/h. Mag 1.6 and K 3.8, both repleted  DVT ppx: Increased Ambulation encouraged, SCDs when in bed. Heparin.   Dispo: Continue with inpatient management for IV antibiotics and symptom monitoring.

## 2024-03-13 NOTE — CHART NOTE - NSCHARTNOTEFT_GEN_A_CORE
Patient seen and examined at bedside.  Reports worsening of right flank pain. Suprapubic pain remains unchanged.  Reports continued thick vaginal discharge with abnormal odor.  Denies fevers, chills, nausea, vomiting, vaginal bleeding.     ICU Vital Signs Last 24 Hrs  T(C): 36.9 (13 Mar 2024 10:19), Max: 37 (13 Mar 2024 04:59)  T(F): 98.4 (13 Mar 2024 10:19), Max: 98.6 (13 Mar 2024 04:59)  HR: 82 (13 Mar 2024 10:19) (82 - 87)  BP: 126/72 (13 Mar 2024 10:19) (106/65 - 138/78)  RR: 18 (13 Mar 2024 10:19) (18 - 18)  SpO2: 94% (13 Mar 2024 10:19) (93% - 96%)    O2 Parameters below as of 13 Mar 2024 10:19  Patient On (Oxygen Delivery Method): room air    Gen: NAD, AAOx3  Resp: unlabored  Abd: soft, non distended, no rebound/guarding, +rt CVA tenderness, +suprapubic tenderness  : clear yellow urine  SSE: thick yellow-white vaginal discharge, smooth consistency, odorless, no vaginal bleeding, no external or vaginal masses or lesions  SVE: vaginal cuff intact without tenderness, no adnexal tenderness    A/P:  - Physical exam consistent with bacterial vaginosis - like infection. Low suspicion for vesicovaginal fistula or rectovaginal fistula.  - CTAP with PO and IV contrast in the AM to r/o intraabdominal pathology of pain.   - Continue with empiric zosyn for suspected abdominal vs  infection.    D/w Dr. Wagner and Dr. Dao

## 2024-03-14 LAB
-  AMOXICILLIN/CLAVULANIC ACID: SIGNIFICANT CHANGE UP
-  AMPICILLIN/SULBACTAM: SIGNIFICANT CHANGE UP
-  AMPICILLIN: SIGNIFICANT CHANGE UP
-  AZTREONAM: SIGNIFICANT CHANGE UP
-  CEFAZOLIN: SIGNIFICANT CHANGE UP
-  CEFEPIME: SIGNIFICANT CHANGE UP
-  CEFOXITIN: SIGNIFICANT CHANGE UP
-  CEFTRIAXONE: SIGNIFICANT CHANGE UP
-  CEFUROXIME: SIGNIFICANT CHANGE UP
-  CIPROFLOXACIN: SIGNIFICANT CHANGE UP
-  ERTAPENEM: SIGNIFICANT CHANGE UP
-  GENTAMICIN: SIGNIFICANT CHANGE UP
-  IMIPENEM: SIGNIFICANT CHANGE UP
-  LEVOFLOXACIN: SIGNIFICANT CHANGE UP
-  MEROPENEM: SIGNIFICANT CHANGE UP
-  NITROFURANTOIN: SIGNIFICANT CHANGE UP
-  PIPERACILLIN/TAZOBACTAM: SIGNIFICANT CHANGE UP
-  TOBRAMYCIN: SIGNIFICANT CHANGE UP
-  TRIMETHOPRIM/SULFAMETHOXAZOLE: SIGNIFICANT CHANGE UP
ANION GAP SERPL CALC-SCNC: 12 MMOL/L — SIGNIFICANT CHANGE UP (ref 5–17)
BASOPHILS # BLD AUTO: 0.05 K/UL — SIGNIFICANT CHANGE UP (ref 0–0.2)
BASOPHILS NFR BLD AUTO: 0.3 % — SIGNIFICANT CHANGE UP (ref 0–2)
BUN SERPL-MCNC: 6.3 MG/DL — LOW (ref 8–20)
C TRACH RRNA SPEC QL NAA+PROBE: SIGNIFICANT CHANGE UP
CALCIUM SERPL-MCNC: 8.5 MG/DL — SIGNIFICANT CHANGE UP (ref 8.4–10.5)
CANDIDA AB TITR SER: SIGNIFICANT CHANGE UP
CHLORIDE SERPL-SCNC: 95 MMOL/L — LOW (ref 96–108)
CO2 SERPL-SCNC: 28 MMOL/L — SIGNIFICANT CHANGE UP (ref 22–29)
CREAT SERPL-MCNC: 0.65 MG/DL — SIGNIFICANT CHANGE UP (ref 0.5–1.3)
EGFR: 103 ML/MIN/1.73M2 — SIGNIFICANT CHANGE UP
EOSINOPHIL # BLD AUTO: 0.06 K/UL — SIGNIFICANT CHANGE UP (ref 0–0.5)
EOSINOPHIL NFR BLD AUTO: 0.4 % — SIGNIFICANT CHANGE UP (ref 0–6)
G VAGINALIS DNA SPEC QL NAA+PROBE: SIGNIFICANT CHANGE UP
GLUCOSE SERPL-MCNC: 80 MG/DL — SIGNIFICANT CHANGE UP (ref 70–99)
HCT VFR BLD CALC: 26.4 % — LOW (ref 34.5–45)
HGB BLD-MCNC: 8.6 G/DL — LOW (ref 11.5–15.5)
IMM GRANULOCYTES NFR BLD AUTO: 0.8 % — SIGNIFICANT CHANGE UP (ref 0–0.9)
LYMPHOCYTES # BLD AUTO: 1.92 K/UL — SIGNIFICANT CHANGE UP (ref 1–3.3)
LYMPHOCYTES # BLD AUTO: 13.4 % — SIGNIFICANT CHANGE UP (ref 13–44)
MAGNESIUM SERPL-MCNC: 1.9 MG/DL — SIGNIFICANT CHANGE UP (ref 1.8–2.6)
MCHC RBC-ENTMCNC: 29.4 PG — SIGNIFICANT CHANGE UP (ref 27–34)
MCHC RBC-ENTMCNC: 32.6 GM/DL — SIGNIFICANT CHANGE UP (ref 32–36)
MCV RBC AUTO: 90.1 FL — SIGNIFICANT CHANGE UP (ref 80–100)
METHOD TYPE: SIGNIFICANT CHANGE UP
MONOCYTES # BLD AUTO: 0.85 K/UL — SIGNIFICANT CHANGE UP (ref 0–0.9)
MONOCYTES NFR BLD AUTO: 5.9 % — SIGNIFICANT CHANGE UP (ref 2–14)
N GONORRHOEA RRNA SPEC QL NAA+PROBE: SIGNIFICANT CHANGE UP
NEUTROPHILS # BLD AUTO: 11.37 K/UL — HIGH (ref 1.8–7.4)
NEUTROPHILS NFR BLD AUTO: 79.2 % — HIGH (ref 43–77)
PHOSPHATE SERPL-MCNC: 4.1 MG/DL — SIGNIFICANT CHANGE UP (ref 2.4–4.7)
PLATELET # BLD AUTO: 423 K/UL — HIGH (ref 150–400)
POTASSIUM SERPL-MCNC: 4 MMOL/L — SIGNIFICANT CHANGE UP (ref 3.5–5.3)
POTASSIUM SERPL-SCNC: 4 MMOL/L — SIGNIFICANT CHANGE UP (ref 3.5–5.3)
RBC # BLD: 2.93 M/UL — LOW (ref 3.8–5.2)
RBC # FLD: 16.8 % — HIGH (ref 10.3–14.5)
SODIUM SERPL-SCNC: 134 MMOL/L — LOW (ref 135–145)
T VAGINALIS SPEC QL WET PREP: SIGNIFICANT CHANGE UP
WBC # BLD: 14.36 K/UL — HIGH (ref 3.8–10.5)
WBC # FLD AUTO: 14.36 K/UL — HIGH (ref 3.8–10.5)

## 2024-03-14 PROCEDURE — 99231 SBSQ HOSP IP/OBS SF/LOW 25: CPT | Mod: 24,GC

## 2024-03-14 PROCEDURE — 74177 CT ABD & PELVIS W/CONTRAST: CPT | Mod: 26

## 2024-03-14 RX ORDER — MAGNESIUM SULFATE 500 MG/ML
2 VIAL (ML) INJECTION ONCE
Refills: 0 | Status: COMPLETED | OUTPATIENT
Start: 2024-03-14 | End: 2024-03-14

## 2024-03-14 RX ADMIN — LIDOCAINE 1 PATCH: 4 CREAM TOPICAL at 08:00

## 2024-03-14 RX ADMIN — LIDOCAINE 1 PATCH: 4 CREAM TOPICAL at 19:00

## 2024-03-14 RX ADMIN — LIDOCAINE 1 PATCH: 4 CREAM TOPICAL at 03:00

## 2024-03-14 RX ADMIN — HEPARIN SODIUM 5000 UNIT(S): 5000 INJECTION INTRAVENOUS; SUBCUTANEOUS at 05:09

## 2024-03-14 RX ADMIN — Medication 975 MILLIGRAM(S): at 18:38

## 2024-03-14 RX ADMIN — Medication 975 MILLIGRAM(S): at 12:00

## 2024-03-14 RX ADMIN — ATORVASTATIN CALCIUM 80 MILLIGRAM(S): 80 TABLET, FILM COATED ORAL at 21:35

## 2024-03-14 RX ADMIN — Medication 50 MILLIGRAM(S): at 05:09

## 2024-03-14 RX ADMIN — Medication 25 GRAM(S): at 14:01

## 2024-03-14 RX ADMIN — LIDOCAINE 1 PATCH: 4 CREAM TOPICAL at 18:29

## 2024-03-14 RX ADMIN — Medication 40 MILLIGRAM(S): at 05:09

## 2024-03-14 RX ADMIN — HEPARIN SODIUM 5000 UNIT(S): 5000 INJECTION INTRAVENOUS; SUBCUTANEOUS at 21:34

## 2024-03-14 RX ADMIN — PIPERACILLIN AND TAZOBACTAM 25 GRAM(S): 4; .5 INJECTION, POWDER, LYOPHILIZED, FOR SOLUTION INTRAVENOUS at 05:08

## 2024-03-14 RX ADMIN — PIPERACILLIN AND TAZOBACTAM 25 GRAM(S): 4; .5 INJECTION, POWDER, LYOPHILIZED, FOR SOLUTION INTRAVENOUS at 14:02

## 2024-03-14 RX ADMIN — HEPARIN SODIUM 5000 UNIT(S): 5000 INJECTION INTRAVENOUS; SUBCUTANEOUS at 14:02

## 2024-03-14 RX ADMIN — Medication 975 MILLIGRAM(S): at 05:09

## 2024-03-14 RX ADMIN — Medication 975 MILLIGRAM(S): at 18:28

## 2024-03-14 RX ADMIN — Medication 975 MILLIGRAM(S): at 00:05

## 2024-03-14 RX ADMIN — Medication 975 MILLIGRAM(S): at 06:04

## 2024-03-14 RX ADMIN — LIDOCAINE 1 PATCH: 4 CREAM TOPICAL at 10:37

## 2024-03-14 RX ADMIN — PIPERACILLIN AND TAZOBACTAM 25 GRAM(S): 4; .5 INJECTION, POWDER, LYOPHILIZED, FOR SOLUTION INTRAVENOUS at 21:34

## 2024-03-14 NOTE — PROGRESS NOTE ADULT - ATTENDING COMMENTS
Attending Attestation    Ms. Mckeon is a 58y now POD#36 s/p ex-lap [facility] BSO, cytoreductive surgery HIPEC, diverting ostomy creation, cystotomy with repair now admitted with FTT, c/f enterovesical fistula intra-abdominal infection. Overall pt doing moderately well. Remains on broad spectrum antibiotics, afebrile, mild leukocytosis. S/p 1u pRBC yesterday for Hbg 7.1, appropriate rise this AM. Remains HDS. PE: abdomen soft, mild TTP; Ostomy with small amount of liquid stool, no blood visible. Labs Attending Attestation    Ms. Mckeon is a 58y now POD#36 s/p ex-lap [facility] BSO, cytoreductive surgery HIPEC, diverting ostomy creation, cystotomy with repair now admitted with FTT, c/f enterovesical fistula intra-abdominal infection. Overall pt doing moderately well. Remains on broad spectrum antibiotics, afebrile, mild leukocytosis. S/p 1u pRBC yesterday for Hbg 7.1, appropriate rise this AM. Remains HDS. PE: abdomen soft, mild TTP; Ostomy with small amount of liquid stool, no blood visible. Labs Hbg 8.6.  CT urogram did not demonstrate leakage of contrast from bladder, however c/f leakage from rectal stump. No role for surgical intervention at this time. Will continue with broad spectrum antibiotics, vaginal leakage likely secondary to intra-abdominal/pelvic fluid/collections from rectal stump drainage. Plan for CTAP with PO contrast to evaluate bowel and c/f enterovesical fistula. Continue to monitor.    Odalys Dao MD  Gynecologic Oncology  3/14/24

## 2024-03-14 NOTE — PROGRESS NOTE ADULT - ASSESSMENT
PENELOPE FLAHERTY is a 58y now POD#36 s/p ex-lap [facility] BSO, cytoreductive surgery HIPEC, diverting ostomy creation, cystotomy with repair who presented with nausea, vomiting, and failure to thrive with concern for enterovesical fistula. Admission imaging showed no evidence of enterovesical fistula. Now with concern for intraabdominal infection and/or pyelonephritis.   HD4    Neuro: Pain persists, continue with tylenol PRN. Lidocaine patch in place for flank pain. Will consider adding oxy prn.   CV: Blood pressure well controlled on home medications.    Pulm: Saturating well on room air  GI: Tolerating FLD. Air/fluid collections in pelvis. Abdomen benign. C/w zosyn. Plan for CT with PO and IV contrast today.  : Desai in place, s/p bladder repair intraoperatively with CT cystogram on POD14 showing connection between mesocolon and rectal pouch, now resolved. Per urology at that time, desai to remain in place until 3/20. Patient with suspected pyelo vs cystitis due to location of pain and UCx +E coli. Will c/w zosyn and follow up sensitivities.   Gyn: Continued white-yellow vaginal discharge. Swabs obtained, affirm neg. General culture swab results pending.   Heme: Hb 9.3 on 2/21 > 7.5 3/12 > 7.1 > 1u pRBCs > AM labs pending.   ID: Afebrile. Mild leukocytosis - WBC 7.6 on 2/21 > 14.3 3/12 > 14.9 > pending.   FEN: LR 100cc/h. Electrolyte repletion PRN.   DVT ppx: Increased Ambulation encouraged, SCDs when in bed. Heparin.   Dispo: Continue with inpatient management for IV antibiotics and imaging.

## 2024-03-15 ENCOUNTER — TRANSCRIPTION ENCOUNTER (OUTPATIENT)
Age: 59
End: 2024-03-15

## 2024-03-15 LAB
-  AMPICILLIN: SIGNIFICANT CHANGE UP
-  CIPROFLOXACIN: SIGNIFICANT CHANGE UP
-  LEVOFLOXACIN: SIGNIFICANT CHANGE UP
-  NITROFURANTOIN: SIGNIFICANT CHANGE UP
-  TETRACYCLINE: SIGNIFICANT CHANGE UP
-  VANCOMYCIN: SIGNIFICANT CHANGE UP
ANION GAP SERPL CALC-SCNC: 13 MMOL/L — SIGNIFICANT CHANGE UP (ref 5–17)
ANION GAP SERPL CALC-SCNC: 13 MMOL/L — SIGNIFICANT CHANGE UP (ref 5–17)
BASOPHILS # BLD AUTO: 0.05 K/UL — SIGNIFICANT CHANGE UP (ref 0–0.2)
BASOPHILS NFR BLD AUTO: 0.4 % — SIGNIFICANT CHANGE UP (ref 0–2)
BUN SERPL-MCNC: 6.2 MG/DL — LOW (ref 8–20)
BUN SERPL-MCNC: 6.8 MG/DL — LOW (ref 8–20)
CALCIUM SERPL-MCNC: 8.1 MG/DL — LOW (ref 8.4–10.5)
CALCIUM SERPL-MCNC: 8.3 MG/DL — LOW (ref 8.4–10.5)
CHLORIDE SERPL-SCNC: 94 MMOL/L — LOW (ref 96–108)
CHLORIDE SERPL-SCNC: 94 MMOL/L — LOW (ref 96–108)
CO2 SERPL-SCNC: 25 MMOL/L — SIGNIFICANT CHANGE UP (ref 22–29)
CO2 SERPL-SCNC: 26 MMOL/L — SIGNIFICANT CHANGE UP (ref 22–29)
CREAT SERPL-MCNC: 0.62 MG/DL — SIGNIFICANT CHANGE UP (ref 0.5–1.3)
CREAT SERPL-MCNC: 0.66 MG/DL — SIGNIFICANT CHANGE UP (ref 0.5–1.3)
CULTURE RESULTS: ABNORMAL
EGFR: 103 ML/MIN/1.73M2 — SIGNIFICANT CHANGE UP
EGFR: 104 ML/MIN/1.73M2 — SIGNIFICANT CHANGE UP
EOSINOPHIL # BLD AUTO: 0.07 K/UL — SIGNIFICANT CHANGE UP (ref 0–0.5)
EOSINOPHIL NFR BLD AUTO: 0.5 % — SIGNIFICANT CHANGE UP (ref 0–6)
GLUCOSE SERPL-MCNC: 79 MG/DL — SIGNIFICANT CHANGE UP (ref 70–99)
GLUCOSE SERPL-MCNC: 88 MG/DL — SIGNIFICANT CHANGE UP (ref 70–99)
HCT VFR BLD CALC: 27.2 % — LOW (ref 34.5–45)
HGB BLD-MCNC: 8.8 G/DL — LOW (ref 11.5–15.5)
IMM GRANULOCYTES NFR BLD AUTO: 0.7 % — SIGNIFICANT CHANGE UP (ref 0–0.9)
LYMPHOCYTES # BLD AUTO: 1.69 K/UL — SIGNIFICANT CHANGE UP (ref 1–3.3)
LYMPHOCYTES # BLD AUTO: 12.2 % — LOW (ref 13–44)
MAGNESIUM SERPL-MCNC: 2 MG/DL — SIGNIFICANT CHANGE UP (ref 1.8–2.6)
MCHC RBC-ENTMCNC: 29.2 PG — SIGNIFICANT CHANGE UP (ref 27–34)
MCHC RBC-ENTMCNC: 32.4 GM/DL — SIGNIFICANT CHANGE UP (ref 32–36)
MCV RBC AUTO: 90.4 FL — SIGNIFICANT CHANGE UP (ref 80–100)
METHOD TYPE: SIGNIFICANT CHANGE UP
MONOCYTES # BLD AUTO: 0.82 K/UL — SIGNIFICANT CHANGE UP (ref 0–0.9)
MONOCYTES NFR BLD AUTO: 5.9 % — SIGNIFICANT CHANGE UP (ref 2–14)
NEUTROPHILS # BLD AUTO: 11.08 K/UL — HIGH (ref 1.8–7.4)
NEUTROPHILS NFR BLD AUTO: 80.3 % — HIGH (ref 43–77)
ORGANISM # SPEC MICROSCOPIC CNT: ABNORMAL
ORGANISM # SPEC MICROSCOPIC CNT: ABNORMAL
ORGANISM # SPEC MICROSCOPIC CNT: SIGNIFICANT CHANGE UP
PHOSPHATE SERPL-MCNC: 4.1 MG/DL — SIGNIFICANT CHANGE UP (ref 2.4–4.7)
PLATELET # BLD AUTO: 429 K/UL — HIGH (ref 150–400)
POTASSIUM SERPL-MCNC: 3.6 MMOL/L — SIGNIFICANT CHANGE UP (ref 3.5–5.3)
POTASSIUM SERPL-MCNC: 3.7 MMOL/L — SIGNIFICANT CHANGE UP (ref 3.5–5.3)
POTASSIUM SERPL-SCNC: 3.6 MMOL/L — SIGNIFICANT CHANGE UP (ref 3.5–5.3)
POTASSIUM SERPL-SCNC: 3.7 MMOL/L — SIGNIFICANT CHANGE UP (ref 3.5–5.3)
RBC # BLD: 3.01 M/UL — LOW (ref 3.8–5.2)
RBC # FLD: 16.7 % — HIGH (ref 10.3–14.5)
SODIUM SERPL-SCNC: 132 MMOL/L — LOW (ref 135–145)
SODIUM SERPL-SCNC: 133 MMOL/L — LOW (ref 135–145)
SPECIMEN SOURCE: SIGNIFICANT CHANGE UP
WBC # BLD: 13.8 K/UL — HIGH (ref 3.8–10.5)
WBC # FLD AUTO: 13.8 K/UL — HIGH (ref 3.8–10.5)

## 2024-03-15 PROCEDURE — 99223 1ST HOSP IP/OBS HIGH 75: CPT

## 2024-03-15 PROCEDURE — 10030 IMG GID FLU COLL DRG SFT TIS: CPT

## 2024-03-15 PROCEDURE — 99232 SBSQ HOSP IP/OBS MODERATE 35: CPT | Mod: GC,24

## 2024-03-15 RX ORDER — DEXTROSE MONOHYDRATE, SODIUM CHLORIDE, AND POTASSIUM CHLORIDE 50; .745; 4.5 G/1000ML; G/1000ML; G/1000ML
1000 INJECTION, SOLUTION INTRAVENOUS
Refills: 0 | Status: DISCONTINUED | OUTPATIENT
Start: 2024-03-15 | End: 2024-03-18

## 2024-03-15 RX ADMIN — Medication 40 MILLIGRAM(S): at 05:45

## 2024-03-15 RX ADMIN — LIDOCAINE 1 PATCH: 4 CREAM TOPICAL at 19:57

## 2024-03-15 RX ADMIN — Medication 50 MILLIGRAM(S): at 05:45

## 2024-03-15 RX ADMIN — ATORVASTATIN CALCIUM 80 MILLIGRAM(S): 80 TABLET, FILM COATED ORAL at 21:18

## 2024-03-15 RX ADMIN — HEPARIN SODIUM 5000 UNIT(S): 5000 INJECTION INTRAVENOUS; SUBCUTANEOUS at 16:05

## 2024-03-15 RX ADMIN — Medication 975 MILLIGRAM(S): at 12:45

## 2024-03-15 RX ADMIN — Medication 975 MILLIGRAM(S): at 05:45

## 2024-03-15 RX ADMIN — Medication 975 MILLIGRAM(S): at 06:25

## 2024-03-15 RX ADMIN — LIDOCAINE 1 PATCH: 4 CREAM TOPICAL at 06:25

## 2024-03-15 RX ADMIN — Medication 975 MILLIGRAM(S): at 23:30

## 2024-03-15 RX ADMIN — PIPERACILLIN AND TAZOBACTAM 25 GRAM(S): 4; .5 INJECTION, POWDER, LYOPHILIZED, FOR SOLUTION INTRAVENOUS at 05:45

## 2024-03-15 RX ADMIN — DEXTROSE MONOHYDRATE, SODIUM CHLORIDE, AND POTASSIUM CHLORIDE 100 MILLILITER(S): 50; .745; 4.5 INJECTION, SOLUTION INTRAVENOUS at 12:46

## 2024-03-15 RX ADMIN — HEPARIN SODIUM 5000 UNIT(S): 5000 INJECTION INTRAVENOUS; SUBCUTANEOUS at 21:18

## 2024-03-15 RX ADMIN — LIDOCAINE 1 PATCH: 4 CREAM TOPICAL at 16:04

## 2024-03-15 RX ADMIN — Medication 975 MILLIGRAM(S): at 17:10

## 2024-03-15 RX ADMIN — PIPERACILLIN AND TAZOBACTAM 25 GRAM(S): 4; .5 INJECTION, POWDER, LYOPHILIZED, FOR SOLUTION INTRAVENOUS at 16:05

## 2024-03-15 RX ADMIN — HEPARIN SODIUM 5000 UNIT(S): 5000 INJECTION INTRAVENOUS; SUBCUTANEOUS at 05:45

## 2024-03-15 NOTE — DISCHARGE NOTE PROVIDER - NSDCMRMEDTOKEN_GEN_ALL_CORE_FT
acetaminophen 325 mg oral tablet: 3 tab(s) orally every 6 hours  Eliquis 2.5 mg oral tablet: 1 tab(s) orally every 12 hours  furosemide 40 mg oral tablet: 1 tab(s) orally  ibuprofen 600 mg oral tablet: 1 tab(s) orally every 6 hours  Lipitor 80 mg oral tablet: 1 tab(s) orally  metoprolol succinate 50 mg oral tablet, extended release: 1 tab(s) orally every 24 hours  valsartan 40 mg oral tablet: 1 tab(s) orally once a day   acetaminophen 325 mg oral tablet: 3 tab(s) orally every 6 hours  amoxicillin-clavulanate 875 mg-125 mg oral tablet: 875 milligram(s) orally 2 times a day  furosemide 40 mg oral tablet: 1 tab(s) orally once a day  ibuprofen 600 mg oral tablet: 1 tab(s) orally every 6 hours  Lipitor 80 mg oral tablet: 1 tab(s) orally once a day  metoprolol succinate 50 mg oral tablet, extended release: 1 tab(s) orally every 24 hours  valsartan 40 mg oral tablet: 1 tab(s) orally once a day

## 2024-03-15 NOTE — DISCHARGE NOTE PROVIDER - CARE PROVIDER_API CALL
Miroslava Perez  Gynecologic Oncology  [address] [place] [id number]  Phone: [phone]  Fax: [phone]  Follow Up Time: 1 week   Miroslava Perez  Gynecologic Oncology  [address] [place] [id number]  Phone: [phone]  Fax: [phone]  Follow Up Time: 1 week    Nilo Fields  Infectious Disease  [address] [place] [address]  Phone: [phone]  Fax: [phone]  Follow Up Time: 2 weeks

## 2024-03-15 NOTE — DISCHARGE NOTE PROVIDER - NSDCCPCAREPLAN_GEN_ALL_CORE_FT
PRINCIPAL DISCHARGE DIAGNOSIS  Diagnosis: Postprocedural intraabdominal abscess  Assessment and Plan of Treatment:

## 2024-03-15 NOTE — DIETITIAN INITIAL EVALUATION ADULT - PERTINENT LABORATORY DATA
03-15    132<L>  |  94<L>  |  6.2<L>  ----------------------------<  79  3.7   |  25.0  |  0.66    Ca    8.3<L>      15 Mar 2024 05:14  Phos  4.1     03-15  Mg     2.0     03-15    A1C with Estimated Average Glucose Result: 5.2 % (01-22-24 @ 11:35)

## 2024-03-15 NOTE — PROGRESS NOTE ADULT - ASSESSMENT
PENELOPE FLAHERTY is a 58y now POD#37 s/p ex-lap [facility] BSO, cytoreductive surgery HIPEC, diverting ostomy creation, cystotomy with repair who presented with nausea, vomiting, and failure to thrive with concern for enterovesical fistula. Admission imaging showed no evidence of enterovesical fistula. Now with concern for intraabdominal infection and/or pyelonephritis.   HD5    Neuro: Pain persists, continue with tylenol PRN. Lidocaine patch in place for flank pain.  CV: Blood pressure well controlled on home medications.    Pulm: Saturating well on room air  GI: Tolerating regular diet. Air/fluid collections in pelvis. Abdomen benign. C/w zosyn. Plan for CT with PO and IV contrast today.  : Desai in place, s/p bladder repair intraoperatively with CT cystogram on POD14 showing connection between mesocolon and rectal pouch, now resolved. Per urology at that time, desai to remain in place until 3/20. Patient with suspected pyelo vs cystitis due to location of pain and UCx +E coli. Will c/w zosyn and follow up sensitivities.   Gyn: Continued white-yellow vaginal discharge. Swabs obtained, affirm neg. General culture swab results pending.   Heme: Hb 9.3 on 2/21 > 7.5 3/12 > 7.1 > 1u pRBCs > 8.8 > AM labs pending.   ID: Afebrile. Mild leukocytosis - WBC 7.6 on 2/21 > 14.3 3/12 > 14.9 > 13.8 > AM labs pending.   FEN: LR 100cc/h. Electrolyte repletion PRN.   DVT ppx: Increased Ambulation encouraged, SCDs when in bed. Heparin.   Dispo: Continue with inpatient management for IV antibiotics

## 2024-03-15 NOTE — DISCHARGE NOTE PROVIDER - HOSPITAL COURSE
57 yo with ovarian cancer on POD32 from [facility], BSO, debulking surgery, rectosigmoid Morfin's procedure and HIPEC complicated by a cystotomy with repair admitted for persistent right flank and suprapubic pain. Imaging findings during admission showed intraperitoneal air-fluid collections and possible fistulization between vagina and rectal stump. Patient also with urine cultures positive for E. Coli and Enterococcus. She was treated with IV antibiotics. Doe catheter that had been in place since surgery was removed during her hospital stay. On discharge, pain had improved, leukocytosis was improving, and patient's vital signs were stable. Patient was discharged home on prophylactic antibiotics. 57 yo with ovarian cancer on POD32 from [facility], BSO, debulking surgery, rectosigmoid Morfin's procedure and HIPEC complicated by a cystotomy with repair admitted for persistent right flank and suprapubic pain. Imaging findings during admission showed intraperitoneal air-fluid collections with suspicion for abscess and possible fistulization between vagina and rectal stump. Patient also with urine cultures positive for E. Coli and Enterococcus. She was treated with IV antibiotics. On 3/15 she underwent drain placement in abscess in the left lower quadrant; purulent contents noted and cultured. Doe catheter that had been in place since surgery was removed during her hospital stay. On discharge, her pain had improved, her leukocytosis had resolved, and her vital signs were stable. Patient was discharged home on PO antibiotics.

## 2024-03-15 NOTE — DIETITIAN INITIAL EVALUATION ADULT - ETIOLOGY
related to inability to meet sufficient protein-energy in setting of ovarian cancer s/p recent surgery, HIPEC, now with N/V and failure to thrive

## 2024-03-15 NOTE — DISCHARGE NOTE PROVIDER - PROVIDER TOKENS
PROVIDER:[TOKEN:[[id number]],FOLLOWUP:[1 week]] yes PROVIDER:[TOKEN:[[id number]],FOLLOWUP:[1 week]],PROVIDER:[TOKEN:[[id number]],FOLLOWUP:[2 weeks]]

## 2024-03-15 NOTE — DIETITIAN INITIAL EVALUATION ADULT - PERTINENT MEDS FT
MEDICATIONS  (STANDING):  atorvastatin 80 milliGRAM(s) Oral at bedtime  furosemide    Tablet 40 milliGRAM(s) Oral daily  piperacillin/tazobactam IVPB.. 3.375 Gram(s) IV Intermittent every 8 hours

## 2024-03-15 NOTE — DISCHARGE NOTE PROVIDER - NSDCHHNEEDSERVICEOTHER_GEN_ALL_CORE_FT
flush left abdominal drain with 10 cc normal saline every 12 hours, change dressing every other day, empty drain each morning and night and keep log

## 2024-03-15 NOTE — PROCEDURE NOTE - PROCEDURE FINDINGS AND DETAILS
left flank abscess drained with 10 f drain  60 cc pus aspirated  to TANYA  cx sent  flush BID (ordered)    fluid right flank not organized.  may need drain in future.  follow with ct.  reconsult IR if needed

## 2024-03-15 NOTE — DISCHARGE NOTE PROVIDER - NSDCFUADDINST_GEN_ALL_CORE_FT
- Please take your antibiotics as prescribed  - Please take tylenol 975mg every 6 hours as needed for pain  - Please follow up with Dr. Perez in one week  - Please call office sooner if fevers > 100.4 F or worsening of abdominal and back pain despite tylenol and antibiotics - Please take augmentin 875mg-125mg 2x/d for 10d.   - Please take tylenol 975mg every 6 hours as needed for pain  - Please follow up with Dr. Perez in one week. Please follow up with Dr. Fields in two weeks.   - Please call office sooner if fevers > 100.4 F or worsening of abdominal and back pain despite tylenol and antibiotics

## 2024-03-15 NOTE — CONSULT NOTE ADULT - SUBJECTIVE AND OBJECTIVE BOX
INFECTIOUS DISEASES AND INTERNAL MEDICINE at [place]  =======================================================  Pierre Sexton MD  Diplomates American Board of Internal Medicine and Infectious Diseases  Telephone [phone]  Fax            [phone]  =======================================================    PENELOPE [id number]      HPI:  GYNECOLOGIC ONCOLOGY CONSULT NOTE    57yo is status post exploratory laparotomy, total abdominal hysterectomy, recto-sigmoid resection en-block, radical pelvic dissection with bilateral ureterolysis, Morfin's pouch, end colostomy, diaphragmatic stripping, Splenic flexure mobilization, infra-gastric omentectomy, resection of falsiform ligament, appendectomy, cystotomy repair, cytoreductive surgery and HIPEC and is here for a bladder reevaluation . 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  She has a desai leg bag in place with CT urogram done 3/5/23. Patient reports discharge, sometimes bloody, from rectum/urine and ostomy. She notes lower abdominal pain, constant. She reports nausea and vomiting over the last few days with trouble tolerating food, she's mostly drinking water, last episode of vomiting this am after drinking water. She denies any fevers chills, sob.   AS ABOVE READMITTED ON 3/11 WITH ABD PELVIC PINA  CT SCAN WITH  FLUID COLLECTION LEFT AND RIGHT PARACOLIC GUTTER  URINE CX POS ECOLI ADN ENTEROCOCCUS   PT PLACED ON EMPIRIC IV ABX    ASKED TO EVALUATE FROM ID STANDPOINT       Surgical History:    EX-lap AZALIA, debulking, cytoreductive surgery and HIPEC 2/6/2024  History of thoracentesis      Past Medical History:   HTN (hypertension)  High cholesterol  H/O pleural effusion    Intolerances   Milk (Nausea)  paclitaxel (Flushing; Other (Severe))      FAMILY HISTORY:  FH: prostate cancer (Father)    Social History:  Denies toxic habits x3         REVIEW OF SYSTEMS:    CONSTITUTIONAL: No fever, weight loss, or fatigue  EYES: No eye pain, visual disturbances, or discharge  ENMT:  No difficulty hearing, tinnitus, vertigo; No sinus or throat pain  NECK: No pain or stiffness  BREASTS: No pain, masses, or nipple discharge  RESPIRATORY: No cough, wheezing, chills or hemoptysis; No shortness of breath  CARDIOVASCULAR: No chest pain, palpitations, dizziness, or leg swelling  GASTROINTESTINAL: No abdominal or epigastric pain. No nausea, vomiting, or hematemesis; No diarrhea or constipation. No melena or hematochezia.  GENITOURINARY: No dysuria, frequency, hematuria, or incontinence  NEUROLOGICAL: No headaches, memory loss, loss of strength, numbness, or tremors  SKIN: No itching, burning, rashes, or lesions   LYMPH NODES: No enlarged glands  ENDOCRINE: No heat or cold intolerance; No hair loss  MUSCULOSKELETAL: No joint pain or swelling; No muscle, back, or extremity pain  PSYCHIATRIC: No depression, anxiety, mood swings, or difficulty sleeping  HEME/LYMPH: No easy bruising, or bleeding gums  ALLERY AND IMMUNOLOGIC: No hives or eczema                       (11 Mar 2024 17:34)      PAST MEDICAL & SURGICAL HISTORY:  HTN (hypertension)      High cholesterol      H/O pleural effusion      History of thoracentesis          ANTIBIOTICS  piperacillin/tazobactam IVPB.. 3.375 Gram(s) IV Intermittent every 8 hours      Allergies    paclitaxel (Flushing; Other (Severe))    Intolerances    Milk (Nausea)      SOCIAL HISTORY:     FAMILY HX   FAMILY HISTORY:  FH: prostate cancer (Father)        Vital Signs Last 24 Hrs  T(C): 36.9 (15 Mar 2024 09:43), Max: 36.9 (15 Mar 2024 09:43)  T(F): 98.5 (15 Mar 2024 09:43), Max: 98.5 (15 Mar 2024 09:43)  HR: 78 (15 Mar 2024 09:43) (71 - 82)  BP: 133/79 (15 Mar 2024 09:43) (131/75 - 136/81)  BP(mean): --  RR: 18 (15 Mar 2024 09:43) (18 - 18)  SpO2: 93% (15 Mar 2024 09:43) (91% - 95%)    Parameters below as of 15 Mar 2024 09:43  Patient On (Oxygen Delivery Method): room air      Drug Dosing Weight  Height (cm): 160.02 (11 Mar 2024 13:00)  Weight (kg): 73.2 (12 Mar 2024 09:57)  BMI (kg/m2): 28.6 (12 Mar 2024 09:57)  BSA (m2): 1.77 (12 Mar 2024 09:57)      REVIEW OF SYSTEMS:    CONSTITUTIONAL:  As per HPI.    HEENT:  Eyes:  No diplopia or blurred vision. ENT:  No earache, sore throat or runny nose.    CARDIOVASCULAR:  No pressure, squeezing, strangling, tightness, heaviness or aching about the chest, neck, axilla or epigastrium.    RESPIRATORY:  No cough, shortness of breath, PND or orthopnea.    GASTROINTESTINAL:  AS PER HPI      GENITOURINARY:  No dysuria, frequency or urgency.    MUSCULOSKELETAL:  As per HPI.    SKIN:  No change in skin, hair or nails.    NEUROLOGIC:  No paresthesias, fasciculations, seizures or weakness.                  PHYSICAL EXAMINATION:    GENERAL: The patient is a _____in no apparent distress. ___     VITAL SIGNS: T(C): 36.9 (03-15-24 @ 09:43), Max: 36.9 (03-15-24 @ 09:43)  HR: 78 (03-15-24 @ 09:43) (71 - 82)  BP: 133/79 (03-15-24 @ 09:43) (131/75 - 136/81)  RR: 18 (03-15-24 @ 09:43) (18 - 18)  SpO2: 93% (03-15-24 @ 09:43) (91% - 95%)  Wt(kg): --    HEENT: Head is normocephalic and atraumatic.  ANICTERIC  NECK: Supple. No carotid bruits.  No lymphadenopathy or thyromegaly.    LUNGS:COARSE BREATH SOUNDS    HEART: Regular rate and rhythm without murmur.    ABDOMEN: Soft, LEFT LOWER ABD TENDERNESS    .  Positive bowel sounds.  No hepatosplenomegaly was noted. NO REBOUND NO GUARDING OSTOMY IN PLACE    EXTREMITIES: NO EDEMA NO ERYTHEMA    NEUROLOGIC: NON FOCAL      SKIN: No ulceration or induration present. NO RASH        BLOOD CULTURES  Culture Results:   Normal genital sonja isolated (03-13 @ 17:10)  Culture Results:   >100,000 CFU/ml Escherichia coli  >100,000 CFU/ml Enterococcus faecalis (03-11 @ 22:00)       URINE CX          LABS:                        8.8    13.80 )-----------( 429      ( 15 Mar 2024 05:14 )             27.2     03-15    132<L>  |  94<L>  |  6.2<L>  ----------------------------<  79  3.7   |  25.0  |  0.66    Ca    8.3<L>      15 Mar 2024 05:14  Phos  4.1     03-15  Mg     2.0     03-15        Urinalysis Basic - ( 15 Mar 2024 05:14 )    Color: x / Appearance: x / SG: x / pH: x  Gluc: 79 mg/dL / Ketone: x  / Bili: x / Urobili: x   Blood: x / Protein: x / Nitrite: x   Leuk Esterase: x / RBC: x / WBC x   Sq Epi: x / Non Sq Epi: x / Bacteria: x        RADIOLOGY & ADDITIONAL STUDIES:      ASSESSMENT/PLAN  57yo is status post exploratory laparotomy, total abdominal hysterectomy, recto-sigmoid resection en-block, radical pelvic dissection with bilateral ureterolysis, Morfin's pouch, end colostomy, diaphragmatic stripping, Splenic flexure mobilization, infra-gastric omentectomy, resection of falsiform ligament, appendectomy, cystotomy repair, cytoreductive surgery and HIPEC and is here for a bladder reevaluation . 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  She has a desai leg bag in place with CT urogram done 3/5/23. Patient reports discharge, sometimes bloody, from rectum/urine and ostomy. She notes lower abdominal pain, constant. She reports nausea and vomiting over the last few days with trouble tolerating food, she's mostly drinking water, last episode of vomiting this am after drinking water. She denies any fevers chills, sob.   AS ABOVE READMITTED ON 3/11 WITH ABD PELVIC PAIN  CT SCAN WITH  FLUID COLLECTION LEFT AND RIGHT PARACOLIC GUTTER  URINE CX POS ECOLI AND ENTEROCOCCUS   PT PLACED ON EMPIRIC IV ABX  REVIEWED SCAN WITH  RADIOLOGY  WOULD RECOMMEND IR EVALUATION OF COLLECTION   CAN CONTINUE ZOSYN FOR NOW  WILL FOLLOWUP WITH FURTHER RECOMMENDATIONS                    MATT TAMAYO MD

## 2024-03-15 NOTE — DIETITIAN INITIAL EVALUATION ADULT - NSFNSGIIOFT_GEN_A_CORE
03-14-24 @ 07:01  -  03-15-24 @ 07:00  --------------------------------------------------------  OUT:    Colostomy (mL): 630 mL  Total OUT: 630 mL    Total NET: -630 mL

## 2024-03-15 NOTE — DISCHARGE NOTE PROVIDER - NSDCFUSCHEDAPPT_GEN_ALL_CORE_FT
Geovanny Conrad  [facility] Physician [facility]  UROLOGY [address]  Scheduled Appointment: 03/27/2024

## 2024-03-15 NOTE — DISCHARGE NOTE PROVIDER - CARE PROVIDERS DIRECT ADDRESSES
[email or web address]

## 2024-03-15 NOTE — DIETITIAN INITIAL EVALUATION ADULT - OTHER INFO
58y now POD#37 s/p ex-lap AZALIA BSO, cytoreductive surgery HIPEC, diverting ostomy creation, cystotomy with repair who presented with nausea, vomiting, and failure to thrive with concern for enterovesical fistula. Admission imaging showed no evidence of enterovesical fistula. Now with concern for intraabdominal infection and/or pyelonephritis.

## 2024-03-15 NOTE — PROGRESS NOTE ADULT - ATTENDING COMMENTS
Attending Attestation  Ms. Mckeon is a 58y now POD#37 s/p ex-lap AZALIA BSO, cytoreductive surgery HIPEC, diverting ostomy creation, cystotomy with repair now admitted with FTT, c/f enterovesical fistula intra-abdominal infection. Overall pt doing moderately well. Remains on broad spectrum antibiotics, afebrile, mild leukocytosis. S/p 1u pRBC yesterday for Hbg 7.1, appropriate rise this AM. Remains HDS. PE: abdomen soft, mild TTP; Ostomy with small amount of liquid stool, no blood visible. Labs Hbg 8.6.  CT urogram did not demonstrate leakage of contrast from bladder, however c/f leakage from rectal stump. CTAP w/ PO contrast did not demonstrate extravasation of contrast, no concern for enterovesical fistula. No role for surgical intervention at this time. Okay to d/c desai, follow-up void. Vaginal leakage likely secondary to intra-abdominal/pelvic fluid/collections from rectal stump drainage. Will continue with broad spectrum antibiotics. ID consult to assist with antibiotic management/transition to PO. Continue to monitor.    Odalys Dao MD  Gynecologic Oncology  3/15/24

## 2024-03-15 NOTE — DISCHARGE NOTE PROVIDER - DETAILS OF MALNUTRITION DIAGNOSIS/DIAGNOSES
This patient has been assessed with a concern for Malnutrition and was treated during this hospitalization for the following Nutrition diagnosis/diagnoses:     -  03/15/2024: Severe protein-calorie malnutrition

## 2024-03-15 NOTE — DIETITIAN INITIAL EVALUATION ADULT - LAB (SPECIFY)
Patient:   MECCA GREEN            MRN: [id number]            FIN: [id number]              Age:   47 hours     Sex:  MALE     :  21   Associated Diagnoses:   [place], single birth; S/P routine circumcision  Author:   SANJAY GRANDA     Procedure   Indication for procedure   Indication: .     Consent / Time Out / Team   Time of procedure: Date/ Time:  19 12:41:00.     Preparation   Inspection: Normal size and shape of penis.     Sterile preparation of site: with 10 % povidone iodine, draped to expose affected area.    Anesthesia: dorsal penile nerve block anesthesia 1% xylocaine without epinephrine.    Position: restrained.     Technique   Instrument used: [email or web address].     Specimen   Specimen: disposed of.     Immediate assessment   Procedure tolerated: well.     Estimated blood loss: 0  ml.     Complications   Complications: none.     Procedure:  CIRCUMCISION   Date/ Time:  19 12:42:00   Preoperative Diagnosis   [place], single birth ([id number]-LT Z38.00, Diagnosis, Hospitalized, Medical).    Postoperative Diagnosis   S/P routine circumcision ([id number]-QY Z98.890, Diagnosis, Hospitalized, Medical).    Performed by   SANJAY GRANDA.     Assistant   None.     Surgical Tasks Performed by Assistant   None.     Findings   Normal shape and size penis..     Specimens Removed   None.     Estimated Blood Loss   0  ml.     Blood Administered   No.     Grafts/Implants   None.     Complications   None.  
chem 8, mg, phos, H/H

## 2024-03-15 NOTE — DIETITIAN NUTRITION RISK NOTIFICATION - TREATMENT: THE FOLLOWING DIET HAS BEEN RECOMMENDED
Diet, Regular:   Supplement Feeding Modality:  Oral  Ensure Plant-Based Cans or Servings Per Day:  1       Frequency:  Two Times a day (03-15-24 @ 13:42) [Pending Verification By Attending]

## 2024-03-15 NOTE — DIETITIAN NUTRITION RISK NOTIFICATION - ADDITIONAL COMMENTS/DIETITIAN RECOMMENDATIONS
[facility] Standard 1.4 BID (455 kcal, 20g protein per serving). Obtain daily weights to monitor trends.

## 2024-03-15 NOTE — DIETITIAN INITIAL EVALUATION ADULT - ORAL INTAKE PTA/DIET HISTORY
Nutrition assessment completed. Spoke with pt via polish  Rodney ID #[id number]. Pt reports poor appetite/po intake for a prolonged time. States since discharge one month ago has not been eating much at home. Pt uncertain of amount of weight loss but states she has definitely lost weight after surgery. Last admission weight one month ago was 197 lbs, current admission weight 161 lbs and indicative of weight loss. Noted with intolerance to milk. Pt receptive to try [employer] supplement which is dairy free and plant based. RD to follow up as feasible.

## 2024-03-16 LAB
BASOPHILS # BLD AUTO: 0.04 K/UL — SIGNIFICANT CHANGE UP (ref 0–0.2)
BASOPHILS NFR BLD AUTO: 0.4 % — SIGNIFICANT CHANGE UP (ref 0–2)
CULTURE RESULTS: SIGNIFICANT CHANGE UP
EOSINOPHIL # BLD AUTO: 0.09 K/UL — SIGNIFICANT CHANGE UP (ref 0–0.5)
EOSINOPHIL NFR BLD AUTO: 0.9 % — SIGNIFICANT CHANGE UP (ref 0–6)
GRAM STN FLD: ABNORMAL
HCT VFR BLD CALC: 28 % — LOW (ref 34.5–45)
HGB BLD-MCNC: 8.8 G/DL — LOW (ref 11.5–15.5)
IMM GRANULOCYTES NFR BLD AUTO: 0.8 % — SIGNIFICANT CHANGE UP (ref 0–0.9)
LYMPHOCYTES # BLD AUTO: 1.65 K/UL — SIGNIFICANT CHANGE UP (ref 1–3.3)
LYMPHOCYTES # BLD AUTO: 17 % — SIGNIFICANT CHANGE UP (ref 13–44)
MCHC RBC-ENTMCNC: 28.7 PG — SIGNIFICANT CHANGE UP (ref 27–34)
MCHC RBC-ENTMCNC: 31.4 GM/DL — LOW (ref 32–36)
MCV RBC AUTO: 91.2 FL — SIGNIFICANT CHANGE UP (ref 80–100)
MONOCYTES # BLD AUTO: 0.68 K/UL — SIGNIFICANT CHANGE UP (ref 0–0.9)
MONOCYTES NFR BLD AUTO: 7 % — SIGNIFICANT CHANGE UP (ref 2–14)
NEUTROPHILS # BLD AUTO: 7.19 K/UL — SIGNIFICANT CHANGE UP (ref 1.8–7.4)
NEUTROPHILS NFR BLD AUTO: 73.9 % — SIGNIFICANT CHANGE UP (ref 43–77)
PLATELET # BLD AUTO: 397 K/UL — SIGNIFICANT CHANGE UP (ref 150–400)
RBC # BLD: 3.07 M/UL — LOW (ref 3.8–5.2)
RBC # FLD: 16.1 % — HIGH (ref 10.3–14.5)
SPECIMEN SOURCE: SIGNIFICANT CHANGE UP
SPECIMEN SOURCE: SIGNIFICANT CHANGE UP
WBC # BLD: 9.73 K/UL — SIGNIFICANT CHANGE UP (ref 3.8–10.5)
WBC # FLD AUTO: 9.73 K/UL — SIGNIFICANT CHANGE UP (ref 3.8–10.5)

## 2024-03-16 PROCEDURE — 99232 SBSQ HOSP IP/OBS MODERATE 35: CPT | Mod: 24,GC

## 2024-03-16 PROCEDURE — 99233 SBSQ HOSP IP/OBS HIGH 50: CPT

## 2024-03-16 RX ORDER — PIPERACILLIN AND TAZOBACTAM 4; .5 G/20ML; G/20ML
3.38 INJECTION, POWDER, LYOPHILIZED, FOR SOLUTION INTRAVENOUS ONCE
Refills: 0 | Status: COMPLETED | OUTPATIENT
Start: 2024-03-16 | End: 2024-03-16

## 2024-03-16 RX ORDER — PIPERACILLIN AND TAZOBACTAM 4; .5 G/20ML; G/20ML
3.38 INJECTION, POWDER, LYOPHILIZED, FOR SOLUTION INTRAVENOUS EVERY 8 HOURS
Refills: 0 | Status: DISCONTINUED | OUTPATIENT
Start: 2024-03-16 | End: 2024-03-18

## 2024-03-16 RX ADMIN — DEXTROSE MONOHYDRATE, SODIUM CHLORIDE, AND POTASSIUM CHLORIDE 100 MILLILITER(S): 50; .745; 4.5 INJECTION, SOLUTION INTRAVENOUS at 08:52

## 2024-03-16 RX ADMIN — PIPERACILLIN AND TAZOBACTAM 25 GRAM(S): 4; .5 INJECTION, POWDER, LYOPHILIZED, FOR SOLUTION INTRAVENOUS at 12:13

## 2024-03-16 RX ADMIN — PIPERACILLIN AND TAZOBACTAM 25 GRAM(S): 4; .5 INJECTION, POWDER, LYOPHILIZED, FOR SOLUTION INTRAVENOUS at 21:22

## 2024-03-16 RX ADMIN — Medication 975 MILLIGRAM(S): at 05:04

## 2024-03-16 RX ADMIN — LIDOCAINE 1 PATCH: 4 CREAM TOPICAL at 19:00

## 2024-03-16 RX ADMIN — HEPARIN SODIUM 5000 UNIT(S): 5000 INJECTION INTRAVENOUS; SUBCUTANEOUS at 05:03

## 2024-03-16 RX ADMIN — DEXTROSE MONOHYDRATE, SODIUM CHLORIDE, AND POTASSIUM CHLORIDE 100 MILLILITER(S): 50; .745; 4.5 INJECTION, SOLUTION INTRAVENOUS at 16:49

## 2024-03-16 RX ADMIN — Medication 50 MILLIGRAM(S): at 05:04

## 2024-03-16 RX ADMIN — PIPERACILLIN AND TAZOBACTAM 200 GRAM(S): 4; .5 INJECTION, POWDER, LYOPHILIZED, FOR SOLUTION INTRAVENOUS at 08:39

## 2024-03-16 RX ADMIN — Medication 975 MILLIGRAM(S): at 23:14

## 2024-03-16 RX ADMIN — Medication 975 MILLIGRAM(S): at 18:16

## 2024-03-16 RX ADMIN — Medication 40 MILLIGRAM(S): at 05:04

## 2024-03-16 RX ADMIN — HEPARIN SODIUM 5000 UNIT(S): 5000 INJECTION INTRAVENOUS; SUBCUTANEOUS at 13:51

## 2024-03-16 RX ADMIN — ATORVASTATIN CALCIUM 80 MILLIGRAM(S): 80 TABLET, FILM COATED ORAL at 21:22

## 2024-03-16 RX ADMIN — HEPARIN SODIUM 5000 UNIT(S): 5000 INJECTION INTRAVENOUS; SUBCUTANEOUS at 21:22

## 2024-03-16 RX ADMIN — Medication 975 MILLIGRAM(S): at 05:06

## 2024-03-16 RX ADMIN — Medication 975 MILLIGRAM(S): at 23:05

## 2024-03-16 RX ADMIN — LIDOCAINE 1 PATCH: 4 CREAM TOPICAL at 04:58

## 2024-03-16 RX ADMIN — Medication 975 MILLIGRAM(S): at 17:16

## 2024-03-16 RX ADMIN — LIDOCAINE 1 PATCH: 4 CREAM TOPICAL at 15:18

## 2024-03-16 RX ADMIN — Medication 975 MILLIGRAM(S): at 11:09

## 2024-03-16 RX ADMIN — Medication 975 MILLIGRAM(S): at 12:09

## 2024-03-16 RX ADMIN — Medication 975 MILLIGRAM(S): at 00:08

## 2024-03-16 NOTE — PROGRESS NOTE ADULT - ASSESSMENT
57yo is status post exploratory laparotomy, total abdominal hysterectomy, recto-sigmoid resection en-block, radical pelvic dissection with bilateral ureterolysis, Morfin's pouch, end colostomy, diaphragmatic stripping, Splenic flexure mobilization, infra-gastric omentectomy, resection of falsiform ligament, appendectomy, cystotomy repair, cytoreductive surgery and HIPEC and is here for a bladder reevaluation . 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  She has a desai leg bag in place with CT urogram done 3/5/23. Patient reports discharge, sometimes bloody, from rectum/urine and ostomy. She notes lower abdominal pain, constant. She reports nausea and vomiting over the last few days with trouble tolerating food, she's mostly drinking water, last episode of vomiting am of presentation after drinking water. She denies any fevers chills, sob.       Left flank abscess  s/p IR draiange 3/15    - Blood cultures ordered   - Follow up IR drain cultures   - UA 3/11 with some pyuria   - Urine cx 3/11 reporting Escherichia coli   Enterococcus faecalis both (S) to Zosyn  - CT A/P reporting multiple peritoneal and retroperitoneal collections   - IR notes reviewed, s/p IR drainage 3/15  - Off antibiotics this AM for some reason  - Will Start patient on Zosyn  - Hold off on PICC/Midline for now unless needed for reasons other than infectious diseases  - Follow up cultures  - Trend Fever  - Trend WBC        Will Follow

## 2024-03-16 NOTE — PROGRESS NOTE ADULT - ASSESSMENT
A/P: PENELOPE FLAHERTY is a 58y now POD#38 s/p ex-lap AZALIA BSO, cytoreductive surgery HIPEC, diverting ostomy creation, cystotomy with repair who presented with nausea, vomiting, and failure to thrive with concern for enterovesical fistula. Admission imaging showed no evidence of enterovesical fistula. Now with concern for intraabdominal infection and/or pyelonephritis. HD#6.     Neuro: Pain well controlled, continue with tylenol PRN. Lidocaine patch in place for flank pain.  CV: Blood pressure well controlled on home medications.    Pulm: Saturating well on room air.   GI: Tolerating regular diet. Air/fluid collections in pelvis. Abdomen benign. C/w zosyn.   : Patient is s/p bladder repair intraoperatively with CT cystogram on POD14 showing connection between mesocolon and rectal pouch, now resolved. Per urology at that time, desai to remain in place until 3/20. Patient with suspected pyelo vs cystitis due to location of pain and UCx +E coli. Will c/w zosyn and follow up sensitivities.  CTAP w/ PO contrast on 3/15 did not demonstrate extravasation of contrast, no concern for enterovesical fistula at this time. Desai removed, voiding spontaneously and adequately.   Gyn: Continued white-yellow vaginal discharge. Swabs obtained, affirm neg. General cultures negative.    Heme: Hb 9.3 on 2/21 > 7.5 3/12 > 7.1 > 1u pRBCs > 8.8 > 8.8.  ID: Afebrile. Mild leukocytosis - WBC 7.6 on 2/21 > 14.3 3/12 > 14.9 > 13.8 > 9.73. CT with collection present. She is s/p IR drainage on 3/15 with purulent drainage. TANYA in place with continued purulent drainage.   FEN: LR 100cc/h. Electrolyte repletion PRN.   DVT ppx: Increased Ambulation encouraged, SCDs when in bed. Heparin.   Dispo: Continue with inpatient management for IV antibiotics

## 2024-03-16 NOTE — PROGRESS NOTE ADULT - ATTENDING COMMENTS
Attending Attestation    Ms. Mckeon is a 58y now POD#38 s/p ex-lap AZALIA BSO, cytoreductive surgery HIPEC, diverting ostomy creation, cystotomy with repair now admitted with FTT, c/f enterovesical fistula intra-abdominal infection. Overall pt doing moderately well. Remains on broad spectrum antibiotics, afebrile, mild leukocytosis. Now s/p IR drainage of left pelvic fluid collection with IR drain in place. Attempted R sided collection drainage but unsuccessful. Pt reports continue but overall improved R sided CVA tenderness. Doe removed yesterday, spontaneously voiding. Endorses some leakage of fluid from vagina. Tolerating PO without issue. Ostomy with liquid stool output, no further episodes of nicholas red blood. Remains HDS. PE: abdomen soft, mild TTP; Ostomy with small amount of liquid stool, no blood visible. Labs WBC 9.73 / Hbg 8.8. Overall, pt doing moderately well. Remains on broad spectrum antibiotics for E.Coli UTI and intra-abdominal abscesses. Afebrile and leukocytosis downtrending. Appreciate ID input. Vaginal leakage likely secondary to intra-abdominal/pelvic fluid/collections from rectal stump drainage. Continue to monitor.    Odalys Dao MD  Gynecologic Oncology  3/15/24

## 2024-03-17 LAB
BASOPHILS # BLD AUTO: 0.04 K/UL — SIGNIFICANT CHANGE UP (ref 0–0.2)
BASOPHILS NFR BLD AUTO: 0.5 % — SIGNIFICANT CHANGE UP (ref 0–2)
EOSINOPHIL # BLD AUTO: 0.09 K/UL — SIGNIFICANT CHANGE UP (ref 0–0.5)
EOSINOPHIL NFR BLD AUTO: 1.2 % — SIGNIFICANT CHANGE UP (ref 0–6)
HCT VFR BLD CALC: 29.3 % — LOW (ref 34.5–45)
HGB BLD-MCNC: 8.8 G/DL — LOW (ref 11.5–15.5)
IMM GRANULOCYTES NFR BLD AUTO: 0.8 % — SIGNIFICANT CHANGE UP (ref 0–0.9)
LYMPHOCYTES # BLD AUTO: 2.14 K/UL — SIGNIFICANT CHANGE UP (ref 1–3.3)
LYMPHOCYTES # BLD AUTO: 27.6 % — SIGNIFICANT CHANGE UP (ref 13–44)
MCHC RBC-ENTMCNC: 27.9 PG — SIGNIFICANT CHANGE UP (ref 27–34)
MCHC RBC-ENTMCNC: 30 GM/DL — LOW (ref 32–36)
MCV RBC AUTO: 93 FL — SIGNIFICANT CHANGE UP (ref 80–100)
MONOCYTES # BLD AUTO: 0.65 K/UL — SIGNIFICANT CHANGE UP (ref 0–0.9)
MONOCYTES NFR BLD AUTO: 8.4 % — SIGNIFICANT CHANGE UP (ref 2–14)
NEUTROPHILS # BLD AUTO: 4.78 K/UL — SIGNIFICANT CHANGE UP (ref 1.8–7.4)
NEUTROPHILS NFR BLD AUTO: 61.5 % — SIGNIFICANT CHANGE UP (ref 43–77)
PLATELET # BLD AUTO: 404 K/UL — HIGH (ref 150–400)
RBC # BLD: 3.15 M/UL — LOW (ref 3.8–5.2)
RBC # FLD: 16.1 % — HIGH (ref 10.3–14.5)
WBC # BLD: 7.76 K/UL — SIGNIFICANT CHANGE UP (ref 3.8–10.5)
WBC # FLD AUTO: 7.76 K/UL — SIGNIFICANT CHANGE UP (ref 3.8–10.5)

## 2024-03-17 PROCEDURE — 99232 SBSQ HOSP IP/OBS MODERATE 35: CPT

## 2024-03-17 RX ADMIN — Medication 975 MILLIGRAM(S): at 12:21

## 2024-03-17 RX ADMIN — HEPARIN SODIUM 5000 UNIT(S): 5000 INJECTION INTRAVENOUS; SUBCUTANEOUS at 13:46

## 2024-03-17 RX ADMIN — ATORVASTATIN CALCIUM 80 MILLIGRAM(S): 80 TABLET, FILM COATED ORAL at 21:07

## 2024-03-17 RX ADMIN — HEPARIN SODIUM 5000 UNIT(S): 5000 INJECTION INTRAVENOUS; SUBCUTANEOUS at 05:15

## 2024-03-17 RX ADMIN — Medication 975 MILLIGRAM(S): at 17:03

## 2024-03-17 RX ADMIN — DEXTROSE MONOHYDRATE, SODIUM CHLORIDE, AND POTASSIUM CHLORIDE 100 MILLILITER(S): 50; .745; 4.5 INJECTION, SOLUTION INTRAVENOUS at 16:17

## 2024-03-17 RX ADMIN — PIPERACILLIN AND TAZOBACTAM 25 GRAM(S): 4; .5 INJECTION, POWDER, LYOPHILIZED, FOR SOLUTION INTRAVENOUS at 21:09

## 2024-03-17 RX ADMIN — Medication 975 MILLIGRAM(S): at 23:49

## 2024-03-17 RX ADMIN — Medication 50 MILLIGRAM(S): at 05:15

## 2024-03-17 RX ADMIN — Medication 975 MILLIGRAM(S): at 11:21

## 2024-03-17 RX ADMIN — Medication 975 MILLIGRAM(S): at 05:39

## 2024-03-17 RX ADMIN — Medication 40 MILLIGRAM(S): at 05:15

## 2024-03-17 RX ADMIN — HEPARIN SODIUM 5000 UNIT(S): 5000 INJECTION INTRAVENOUS; SUBCUTANEOUS at 21:07

## 2024-03-17 RX ADMIN — Medication 975 MILLIGRAM(S): at 05:15

## 2024-03-17 RX ADMIN — LIDOCAINE 1 PATCH: 4 CREAM TOPICAL at 03:46

## 2024-03-17 RX ADMIN — PIPERACILLIN AND TAZOBACTAM 25 GRAM(S): 4; .5 INJECTION, POWDER, LYOPHILIZED, FOR SOLUTION INTRAVENOUS at 13:46

## 2024-03-17 RX ADMIN — LIDOCAINE 1 PATCH: 4 CREAM TOPICAL at 13:47

## 2024-03-17 RX ADMIN — PIPERACILLIN AND TAZOBACTAM 25 GRAM(S): 4; .5 INJECTION, POWDER, LYOPHILIZED, FOR SOLUTION INTRAVENOUS at 05:15

## 2024-03-17 RX ADMIN — LIDOCAINE 1 PATCH: 4 CREAM TOPICAL at 19:42

## 2024-03-17 NOTE — PROGRESS NOTE ADULT - ASSESSMENT
59yo is status post exploratory laparotomy, total abdominal hysterectomy, recto-sigmoid resection en-block, radical pelvic dissection with bilateral ureterolysis, Morfin's pouch, end colostomy, diaphragmatic stripping, Splenic flexure mobilization, infra-gastric omentectomy, resection of falsiform ligament, appendectomy, cystotomy repair, cytoreductive surgery and HIPEC and is here for a bladder reevaluation . 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  She has a desai leg bag in place with CT urogram done 3/5/23. Patient reports discharge, sometimes bloody, from rectum/urine and ostomy. She notes lower abdominal pain, constant. She reports nausea and vomiting over the last few days with trouble tolerating food, she's mostly drinking water, last episode of vomiting am of presentation after drinking water. She denies any fevers chills, sob.       Left flank abscess  s/p IR drainage 3/15    - Blood cultures pending   - Follow up IR drain cultures   - UA 3/11 with some pyuria   - Urine cx 3/11 reporting Escherichia coli   Enterococcus faecalis both (S) to Zosyn  - CT A/P reporting multiple peritoneal and retroperitoneal collections   - IR notes reviewed, s/p IR drainage 3/15  - Continue Zosyn  - Hold off on PICC/Midline for now unless needed for reasons other than infectious diseases  - Follow up cultures  - Trend Fever  - Trend WBC        Will Follow

## 2024-03-17 NOTE — PROGRESS NOTE ADULT - ASSESSMENT
A/P: PENELOPE FLAHERTY is a 58y now POD#39 s/p ex-lap AZALIA BSO, cytoreductive surgery HIPEC, diverting ostomy creation, cystotomy with repair who presented with nausea, vomiting, and failure to thrive with concern for enterovesical fistula. Admission imaging showed no evidence of enterovesical fistula. Now with concern for intraabdominal infection and/or pyelonephritis. HD#7.     Neuro: Pain well controlled, continue with tylenol PRN. Lidocaine patch in place for flank pain.  CV: Blood pressure well controlled on home medications.    Pulm: Saturating well on room air.   GI: Tolerating regular diet. Air/fluid collections in pelvis. Abdomen benign. C/w zosyn.   : Patient is s/p bladder repair intraoperatively with CT cystogram on POD14 showing connection between mesocolon and rectal pouch, now resolved. Per urology at that time, desai to remain in place until 3/20. Patient with suspected pyelo vs cystitis due to location of pain and UCx +E coli. Will c/w zosyn and follow up sensitivities.  CTAP w/ PO contrast on 3/15 did not demonstrate extravasation of contrast, no concern for enterovesical fistula at this time. Desai removed, voiding spontaneously and adequately.   Gyn: Continued white-yellow vaginal discharge. Swabs obtained, affirm neg. General cultures negative.    Heme: Hb 9.3 on 2/21 > 7.5 3/12 > 7.1 > 1u pRBCs > 8.8 > 8.8> AM labs pending.   ID: Afebrile. Mild leukocytosis - WBC 7.6 on 2/21 > 14.3 3/12 > 14.9 > 13.8 > 9.73. CT with collection present. She is s/p IR drainage on 3/15 with purulent drainage. TANYA in place with continued purulent drainage, however minimal (10cc overnight).   FEN: LR 100cc/h. Electrolyte repletion PRN.   DVT ppx: Increased Ambulation encouraged, SCDs when in bed. Heparin.   Dispo: Continue with inpatient management for IV antibiotics

## 2024-03-18 ENCOUNTER — TRANSCRIPTION ENCOUNTER (OUTPATIENT)
Age: 59
End: 2024-03-18

## 2024-03-18 VITALS
OXYGEN SATURATION: 96 % | SYSTOLIC BLOOD PRESSURE: 101 MMHG | HEART RATE: 83 BPM | DIASTOLIC BLOOD PRESSURE: 65 MMHG | RESPIRATION RATE: 19 BRPM | TEMPERATURE: 98 F

## 2024-03-18 LAB
BLD GP AB SCN SERPL QL: SIGNIFICANT CHANGE UP
HCT VFR BLD CALC: 27.2 % — LOW (ref 34.5–45)
HGB BLD-MCNC: 8.4 G/DL — LOW (ref 11.5–15.5)
MCHC RBC-ENTMCNC: 28.9 PG — SIGNIFICANT CHANGE UP (ref 27–34)
MCHC RBC-ENTMCNC: 30.9 GM/DL — LOW (ref 32–36)
MCV RBC AUTO: 93.5 FL — SIGNIFICANT CHANGE UP (ref 80–100)
PLATELET # BLD AUTO: 353 K/UL — SIGNIFICANT CHANGE UP (ref 150–400)
RBC # BLD: 2.91 M/UL — LOW (ref 3.8–5.2)
RBC # FLD: 16.1 % — HIGH (ref 10.3–14.5)
WBC # BLD: 8.9 K/UL — SIGNIFICANT CHANGE UP (ref 3.8–10.5)
WBC # FLD AUTO: 8.9 K/UL — SIGNIFICANT CHANGE UP (ref 3.8–10.5)

## 2024-03-18 PROCEDURE — 74177 CT ABD & PELVIS W/CONTRAST: CPT | Mod: MC

## 2024-03-18 PROCEDURE — 74178 CT ABD&PLV WO CNTR FLWD CNTR: CPT | Mod: MC

## 2024-03-18 PROCEDURE — 85025 COMPLETE CBC W/AUTO DIFF WBC: CPT

## 2024-03-18 PROCEDURE — 86923 COMPATIBILITY TEST ELECTRIC: CPT

## 2024-03-18 PROCEDURE — 87040 BLOOD CULTURE FOR BACTERIA: CPT

## 2024-03-18 PROCEDURE — C1769: CPT

## 2024-03-18 PROCEDURE — 86850 RBC ANTIBODY SCREEN: CPT

## 2024-03-18 PROCEDURE — 81001 URINALYSIS AUTO W/SCOPE: CPT

## 2024-03-18 PROCEDURE — 84100 ASSAY OF PHOSPHORUS: CPT

## 2024-03-18 PROCEDURE — 87491 CHLMYD TRACH DNA AMP PROBE: CPT

## 2024-03-18 PROCEDURE — 83735 ASSAY OF MAGNESIUM: CPT

## 2024-03-18 PROCEDURE — 87205 SMEAR GRAM STAIN: CPT

## 2024-03-18 PROCEDURE — 86900 BLOOD TYPING SEROLOGIC ABO: CPT

## 2024-03-18 PROCEDURE — 87186 SC STD MICRODIL/AGAR DIL: CPT

## 2024-03-18 PROCEDURE — 87070 CULTURE OTHR SPECIMN AEROBIC: CPT

## 2024-03-18 PROCEDURE — 86901 BLOOD TYPING SEROLOGIC RH(D): CPT

## 2024-03-18 PROCEDURE — 87077 CULTURE AEROBIC IDENTIFY: CPT

## 2024-03-18 PROCEDURE — 87591 N.GONORRHOEAE DNA AMP PROB: CPT

## 2024-03-18 PROCEDURE — C1729: CPT

## 2024-03-18 PROCEDURE — 36430 TRANSFUSION BLD/BLD COMPNT: CPT

## 2024-03-18 PROCEDURE — 87800 DETECT AGNT MULT DNA DIREC: CPT

## 2024-03-18 PROCEDURE — 36415 COLL VENOUS BLD VENIPUNCTURE: CPT

## 2024-03-18 PROCEDURE — 85027 COMPLETE CBC AUTOMATED: CPT

## 2024-03-18 PROCEDURE — 99233 SBSQ HOSP IP/OBS HIGH 50: CPT

## 2024-03-18 PROCEDURE — 87086 URINE CULTURE/COLONY COUNT: CPT

## 2024-03-18 PROCEDURE — 75989 ABSCESS DRAINAGE UNDER X-RAY: CPT

## 2024-03-18 PROCEDURE — 80053 COMPREHEN METABOLIC PANEL: CPT

## 2024-03-18 PROCEDURE — P9016: CPT

## 2024-03-18 PROCEDURE — 80048 BASIC METABOLIC PNL TOTAL CA: CPT

## 2024-03-18 RX ADMIN — Medication 975 MILLIGRAM(S): at 05:37

## 2024-03-18 RX ADMIN — Medication 50 MILLIGRAM(S): at 05:07

## 2024-03-18 RX ADMIN — PIPERACILLIN AND TAZOBACTAM 25 GRAM(S): 4; .5 INJECTION, POWDER, LYOPHILIZED, FOR SOLUTION INTRAVENOUS at 13:32

## 2024-03-18 RX ADMIN — Medication 975 MILLIGRAM(S): at 11:58

## 2024-03-18 RX ADMIN — DEXTROSE MONOHYDRATE, SODIUM CHLORIDE, AND POTASSIUM CHLORIDE 100 MILLILITER(S): 50; .745; 4.5 INJECTION, SOLUTION INTRAVENOUS at 05:45

## 2024-03-18 RX ADMIN — HEPARIN SODIUM 5000 UNIT(S): 5000 INJECTION INTRAVENOUS; SUBCUTANEOUS at 13:33

## 2024-03-18 RX ADMIN — Medication 40 MILLIGRAM(S): at 05:07

## 2024-03-18 RX ADMIN — Medication 975 MILLIGRAM(S): at 05:07

## 2024-03-18 RX ADMIN — LIDOCAINE 1 PATCH: 4 CREAM TOPICAL at 13:34

## 2024-03-18 RX ADMIN — Medication 975 MILLIGRAM(S): at 12:17

## 2024-03-18 RX ADMIN — Medication 975 MILLIGRAM(S): at 00:19

## 2024-03-18 RX ADMIN — HEPARIN SODIUM 5000 UNIT(S): 5000 INJECTION INTRAVENOUS; SUBCUTANEOUS at 05:06

## 2024-03-18 RX ADMIN — LIDOCAINE 1 PATCH: 4 CREAM TOPICAL at 02:00

## 2024-03-18 RX ADMIN — PIPERACILLIN AND TAZOBACTAM 25 GRAM(S): 4; .5 INJECTION, POWDER, LYOPHILIZED, FOR SOLUTION INTRAVENOUS at 05:05

## 2024-03-18 NOTE — PROGRESS NOTE ADULT - NUTRITIONAL ASSESSMENT
This patient has been assessed with a concern for Malnutrition and has been determined to have a diagnosis/diagnoses of Severe protein-calorie malnutrition.    This patient is being managed with:   Diet Regular-  Supplement Feeding Modality:  Oral  Ensure Plant-Based Cans or Servings Per Day:  1       Frequency:  Two Times a day  Entered: Mar 15 2024  1:42PM  
This patient has been assessed with a concern for Malnutrition and has been determined to have a diagnosis/diagnoses of Severe protein-calorie malnutrition.    This patient is being managed with:   Diet Regular-  Supplement Feeding Modality:  Oral  Ensure Plant-Based Cans or Servings Per Day:  1       Frequency:  Two Times a day  Entered: Mar 15 2024  1:42PM  
This patient has been assessed with a concern for Malnutrition and has been determined to have a diagnosis/diagnoses of Moderate protein-calorie malnutrition.    This patient is being managed with:   Diet Regular-  Entered: Feb 20 2024  5:25PM  
This patient has been assessed with a concern for Malnutrition and has been determined to have a diagnosis/diagnoses of Severe protein-calorie malnutrition.    This patient is being managed with:   Diet Regular-  Supplement Feeding Modality:  Oral  Ensure Plant-Based Cans or Servings Per Day:  1       Frequency:  Two Times a day  Entered: Mar 15 2024  1:42PM    Diet Regular-  Entered: Mar 14 2024  6:14PM    The following pending diet order is being considered for treatment of Severe protein-calorie malnutrition:null

## 2024-03-18 NOTE — PROGRESS NOTE ADULT - ASSESSMENT
A/P: PENELOPE FLAHERTY is a 58y now POD#40 s/p ex-lap AZALIA BSO, cytoreductive surgery HIPEC, diverting ostomy creation, cystotomy with repair who presented with nausea, vomiting, and failure to thrive with concern for enterovesical fistula. Admission imaging showed no evidence of enterovesical fistula. Now with concern for intraabdominal infection and/or pyelonephritis. IR drain placed for intraabdominal collection drainage. HD#8.     Neuro: Pain improved, continue with tylenol PRN and Lidocaine patch.   CV: Blood pressure well controlled on home medications.    Pulm: Saturating well on room air.   GI: Tolerating regular diet. Air/fluid collections in pelvis - IR placed TANYA drain in left abscess on 3/15, 60cc pus aspirated. 20cc purulent output O/N. Drain culture growing Streptococcus anginosus. Abdomen benign. C/w zosyn.   : Patient is s/p bladder repair intraoperatively with CT cystogram on POD14 showing connection between mesocolon and rectal pouch, now resolved. Per urology at that time, desai to remain in place until 3/20. Patient with suspected pyelo vs cystitis due to location of pain and UCx +E coli. sensitive to zosyn. CTAP w/ PO contrast on 3/15 did not demonstrate extravasation of contrast, no concern for enterovesical fistula at this time. Desai removed, voiding spontaneously and adequately.   Gyn: White-yellow vaginal discharge. Affirm and general cultures negative.    Heme: Hb 9.3 on 2/21 > 7.5 3/12 > 7.1 > 1u pRBCs > 8.8 > 8.8> stable.   ID: Afebrile. Mild leukocytosis, now resolved. WBC 14.3 3/12 >> 7.8 3/18.   FEN: Electrolyte repletion PRN.   DVT ppx: Increased Ambulation encouraged, SCDs when in bed. Heparin.   Dispo: Continue with inpatient management for ID recs on d/c antibiotics.

## 2024-03-18 NOTE — PROGRESS NOTE ADULT - ASSESSMENT
59yo is status post exploratory laparotomy, total abdominal hysterectomy, recto-sigmoid resection en-block, radical pelvic dissection with bilateral ureterolysis, Morfin's pouch, end colostomy, diaphragmatic stripping, Splenic flexure mobilization, infra-gastric omentectomy, resection of falsiform ligament, appendectomy, cystotomy repair, cytoreductive surgery and HIPEC and is here for a bladder reevaluation . 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  She has a desai leg bag in place with CT urogram done 3/5/23. Patient reports discharge, sometimes bloody, from rectum/urine and ostomy. She notes lower abdominal pain, constant. She reports nausea and vomiting over the last few days with trouble tolerating food, she's mostly drinking water, last episode of vomiting am of presentation after drinking water. She denies any fevers chills, sob.       Left flank abscess  s/p IR drainage 3/15    - Blood cultures NEG   -  IR drain cultures  STREP ANGIONISIS AND INTERMEDIUS   -   OVERALL IMPROVED AND REMAINS AFEBRILE WBC BACK TO NORMAL  WOIULD SUGGEST AUGMENTIN 875 BID FOR  AT LEAST 2 WEEKS  RECOMMEND GYN SPEAK TO IR ABOUT DRAIN  WILL NEED REPEAT CT IMAGING AS OUTPT  WILL FOLLOWUP   SPOKE TO GYN /ONC RESIDENT

## 2024-03-18 NOTE — DISCHARGE NOTE NURSING/CASE MANAGEMENT/SOCIAL WORK - NSDCPEFALRISK_GEN_ALL_CORE
For information on Fall & Injury Prevention, visit: [email or web address] OR  [email or web address] OR  https://www.cdc.gov/steadi/patient.html

## 2024-03-18 NOTE — PROGRESS NOTE ADULT - SUBJECTIVE AND OBJECTIVE BOX
GYNECOLOGIC ONCOLOGY PROGRESS NOTE    Pt seen and examined at bedside.     SUBJECTIVE:  Patient is without complaints.  Flatus: Yes   Denies Fevers, Chills, Nausea, Vomiting or Diarrhea.    OBJECTIVE:     VITALS:  T(F): 98.5 (03-16-24 @ 04:16), Max: 98.5 (03-15-24 @ 09:43)  HR: 78 (03-16-24 @ 04:16) (74 - 90)  BP: 116/80 (03-16-24 @ 04:16) (116/80 - 133/79)  RR: 18 (03-16-24 @ 04:16) (17 - 18)  SpO2: 94% (03-16-24 @ 04:16) (93% - 95%)    I&O's Summary    15 Mar 2024 07:01  -  16 Mar 2024 07:00  --------------------------------------------------------  IN: 1100 mL / OUT: 2050 mL / NET: -950 mL      MEDICATIONS  (STANDING):  acetaminophen     Tablet .. 975 milliGRAM(s) Oral every 6 hours  atorvastatin 80 milliGRAM(s) Oral at bedtime  furosemide    Tablet 40 milliGRAM(s) Oral daily  heparin   Injectable 5000 Unit(s) SubCutaneous every 8 hours  lidocaine   4% Patch 1 Patch Transdermal every 24 hours  metoprolol succinate ER 50 milliGRAM(s) Oral daily  piperacillin/tazobactam IVPB.- 3.375 Gram(s) IV Intermittent once  piperacillin/tazobactam IVPB.. 3.375 Gram(s) IV Intermittent every 8 hours  sodium chloride 0.9% with potassium chloride 20 mEq/L 1000 milliLiter(s) (100 mL/Hr) IV Continuous <Continuous>    MEDICATIONS  (PRN):  acetaminophen     Tablet .. 650 milliGRAM(s) Oral every 6 hours PRN Temp greater or equal to 38C (100.4F), Mild Pain (1 - 3), Moderate Pain (4 - 6), Severe Pain (7 - 10)      Physical Exam:  Gen: NAD, AAOx3  Resp: breathing comfortably on RA  Abdomen: soft, nondistended, significant suprapubic tenderness, continued right CVA tenderness, healed midline laparotomy scar  GI: ostomy pink, +light brown stool output, no blood  : desai in place draining clear yellow urine  Ext: no LE tenderness/swelling    LABS:                        8.8    9.73  )-----------( 397      ( 16 Mar 2024 05:41 )             28.0     03-15    133<L>  |  94<L>  |  6.8<L>  ----------------------------<  88  3.6   |  26.0  |  0.62    Ca    8.1<L>      15 Mar 2024 14:23  Phos  4.1     03-15  Mg     2.0     03-15        Urinalysis Basic - ( 15 Mar 2024 14:23 )    Color: x / Appearance: x / SG: x / pH: x  Gluc: 88 mg/dL / Ketone: x  / Bili: x / Urobili: x   Blood: x / Protein: x / Nitrite: x   Leuk Esterase: x / RBC: x / WBC x   Sq Epi: x / Non Sq Epi: x / Bacteria: x    < from: CT Abdomen and Pelvis w/ Oral Cont and w/ IV Cont (03.14.24 @ 15:36) >  PROCEDURE DATE:  03/14/2024          INTERPRETATION:  CLINICAL INFORMATION: Evaluate ostomy, rule out GI bleed   or fistula.    COMPARISON: CT 3/12/2024, 3/5/2024, 2/20/2024    CONTRAST/COMPLICATIONS:  IV Contrast: Omnipaque 350  90 cc administered   10 cc discarded  Oral Contrast: Gastroview  Complications: None reported at time of study completion    PROCEDURE:  CT of the Abdomen and Pelvis was performed.  Sagittal and coronal reformats were performed.    FINDINGS:  LOWER CHEST: Trace right pleural effusion with suspected loculation.   Adjacent right basilar atelectasis/scarring.    LIVER: Within normal limits.  BILE DUCTS: Normal caliber.  GALLBLADDER: Within normal limits.  SPLEEN: Within normal limits.  PANCREAS: Within normal limits.  ADRENALS: Within normal limits.  KIDNEYS/URETERS: Symmetric enhancement. No hydronephrosis.    BLADDER: Collapsed with Desai catheter.  REPRODUCTIVE ORGANS: Hysterectomy.    BOWEL: Status post partial colectomy with left lower quadrant colostomy   and Morfin stump. No bowel obstruction; oral contrast opacifies bowel to   the level of the distal colon. Clustered small bowel loops are again seen   in the pelvis. Rectal stump is unchanged in appearance.  PERITONEUM: Multiple peritoneal and retroperitoneal collections are   without interval change compared to the immediate prior CT 3/12/2024,   including a right retroperitoneal fluid collection, and left   extraperitoneal paracolic gutter collection, a thin gas-filled collection   along the right paracolic gutter, and a small gas-filled collection in   the anterior pelvis. A small collection adjacent to the posterior vaginal   cuff and at the rectal stump demonstrates a small locule of gas and is   contiguous with the posterior vaginal cuff and rectal stump (4-72),   concerning for developing rectovaginal fistula.  VESSELS: Within normal limits.  RETROPERITONEUM/LYMPH NODES: No lymphadenopathy.  ABDOMINAL WALL: Postsurgical changes. Slightly increased gas in the   anterior abdominal wall deep to the sutures, likely in continuity with   subjacent pelvic collection.  BONES: Within normal limits.    IMPRESSION:  Small collection in the pelvis is contiguouswith the posterior vaginal   cuff and rectal stump as described above, concerning for a developing   rectovaginal fistula.    Postsurgical changes of Dayana's procedure and hysterectomy with   multiple peritoneal and retroperitoneal collections as described above   without significant interval change from the immediate prior CT 3/12/2024.    No bowel obstruction; oral contrast opacifies bowel to the level of the   distal colon. GI bleed cannot be excluded on a single phase CT in the   setting of positive oral contrast.    Other findings as above.    < end of copied text >          
PENELOPE FLAHERTY is a 58y now POD#34 s/p ex-lap AZALIA BSO, cytoreductive surgery HIPEC, diverting ostomy creation, cystotomy with repair who presented yesterday with nausea, vomiting, and failure to thrive with concern for enterovesical fistula.   HD2    O/N events: CT cystogram preliminary read negative. Nitrite positive urine.     SUBJECTIVE:  Reports suprapubic pain this AM, requesting pain medications.  Continues to be NPO. No N/V.  Denies fevers, chills.  Continued bloody, watery output per colostomy.    OBJECTIVE:     ICU Vital Signs Last 24 Hrs  T(C): 36.9 (12 Mar 2024 04:16), Max: 37.1 (11 Mar 2024 23:25)  T(F): 98.5 (12 Mar 2024 04:16), Max: 98.7 (11 Mar 2024 23:25)  HR: 77 (12 Mar 2024 04:16) (77 - 98)  BP: 122/67 (12 Mar 2024 04:16) (92/61 - 138/78)  RR: 18 (12 Mar 2024 04:16) (18 - 19)  SpO2: 95% (12 Mar 2024 04:16) (91% - 95%)    O2 Parameters below as of 12 Mar 2024 04:16  Patient On (Oxygen Delivery Method): room air    MEDICATIONS  (STANDING):  cefTRIAXone Injectable. 1000 milliGRAM(s) IV Push every 24 hours  lactated ringers. 1000 milliLiter(s) (100 mL/Hr) IV Continuous <Continuous>    MEDICATIONS  (PRN):  acetaminophen   IVPB .. 1000 milliGRAM(s) IV Intermittent every 6 hours PRN Mild Pain (1 - 3), Moderate Pain (4 - 6), Severe Pain (7 - 10)    Physical Exam:  Gen: NAD, AAOx3  Resp: breathing comfortably on RA  Abdomen: soft, nondistended, healed midline laparotomy scar, +right CVA tenderness, + suprapubic tenderness  GI: ostomy pink, +soft brown stool output, +watery bloody stool output  : desai in place draining yellow urine  Pelvic: no active vaginal bleeding     LABS:                          7.5    14.27 )-----------( 404      ( 12 Mar 2024 06:18 )             23.4     03-12    134<L>  |  92<L>  |  10.6  ----------------------------<  86  3.3<L>   |  27.0  |  0.79    Ca    8.7      12 Mar 2024 06:18  Phos  4.5     03-12  Mg     2.0     03-12    TPro  7.0  /  Alb  2.6<L>  /  TBili  0.4  /  DBili  x   /  AST  11  /  ALT  7   /  AlkPhos  154<H>  03-11      Urine Microscopic-Add On (NC) (03.11.24 @ 22:00)    Red Blood Cell - Urine: 6 /HPF   White Blood Cell - Urine: 101 /HPF   Cast: 5 /LPF   Epithelial Cells: 0 /HPF   Bacteria: Many /HPF    Urinalysis (03.11.24 @ 22:00)    Glucose Qualitative, Urine: Negative mg/dL   Blood, Urine: Moderate   pH Urine: 6.0   Color: Yellow   Urine Appearance: Cloudy   Bilirubin: Negative   Ketone - Urine: Negative mg/dL   Specific Gravity: 1.013   Protein, Urine: Trace mg/dL   Urobilinogen: 1.0 mg/dL   Nitrite: Positive   Leukocyte Esterase Concentration: Moderate    Radiology:    < from: CT Abdomen and Pelvis w/wo IV Cont (03.12.24 @ 02:53) >  ACC: [id number] EXAM:  CT ABDOMEN AND PELVIS WAW IC   ORDERED BY: RAVIN HAN     PROCEDURE DATE:  03/12/2024    ******PRELIMINARY REPORT******      ******PRELIMINARY REPORT******           INTERPRETATION:  PRELIM:    On this follow up cystogram, contrast is seen in urinary bladder, without   evidence of fistula to small bowel or connection to rectum, as seen on   prior study of 2/20.    Official report in am.        ******PRELIMINARY REPORT******      ******PRELIMINARY REPORT******     < end of copied text >  
PENELOPE FLAHERTY is a 58y now POD#35 s/p ex-lap AZALIA BSO, cytoreductive surgery HIPEC, diverting ostomy creation, cystotomy with repair who presented with nausea, vomiting, and failure to thrive with concern for enterovesical fistula.   HD3    SUBJECTIVE:  Reports improvement in pain since yesterday with tylenol.   Tolerating FLD. Reports mild nausea, no vomiting.   Denies fevers, chills.    ICU Vital Signs Last 24 Hrs  T(C): 37 (13 Mar 2024 04:59), Max: 37 (13 Mar 2024 04:59)  T(F): 98.6 (13 Mar 2024 04:59), Max: 98.6 (13 Mar 2024 04:59)  HR: 86 (13 Mar 2024 04:59) (86 - 102)  BP: 106/65 (13 Mar 2024 04:59) (106/65 - 138/78)  RR: 18 (13 Mar 2024 04:59) (18 - 18)  SpO2: 96% (13 Mar 2024 04:59) (92% - 96%)    O2 Parameters below as of 13 Mar 2024 04:59  Patient On (Oxygen Delivery Method): room air    Physical Exam:  Gen: NAD, AAOx3  Resp: breathing comfortably on RA  Abdomen: soft, nondistended, non tender, healed midline laparotomy scar  GI: ostomy pink, +dark brown stool output, +watery dark brown red tinged output  : desai in place draining clear yellow urine  Ext: no LE tenderness/swelling    LABS:                          7.5    14.27 )-----------( 404      ( 12 Mar 2024 06:18 )             23.4                           7.1    14.86 )-----------( 439      ( 13 Mar 2024 05:58 )             22.5       03-12    134<L>  |  92<L>  |  10.6  ----------------------------<  86  3.3<L>   |  27.0  |  0.79    Ca    8.7      12 Mar 2024 06:18  Phos  4.5     03-12  Mg     2.0     03-12    TPro  7.0  /  Alb  2.6<L>  /  TBili  0.4  /  DBili  x   /  AST  11  /  ALT  7   /  AlkPhos  154<H>  03-11 03-13    136  |  96  |  6.5<L>  ----------------------------<  95  3.8   |  28.0  |  0.67    Ca    8.3<L>      13 Mar 2024 05:58  Phos  3.9     03-13  Mg     1.6     03-13    TPro  7.0  /  Alb  2.6<L>  /  TBili  0.4  /  DBili  x   /  AST  11  /  ALT  7   /  AlkPhos  154<H>  03-11      Urine Microscopic-Add On (NC) (03.11.24 @ 22:00)    Red Blood Cell - Urine: 6 /HPF   White Blood Cell - Urine: 101 /HPF   Cast: 5 /LPF   Epithelial Cells: 0 /HPF   Bacteria: Many /HPF    Urinalysis (03.11.24 @ 22:00)    Glucose Qualitative, Urine: Negative mg/dL   Blood, Urine: Moderate   pH Urine: 6.0   Color: Yellow   Urine Appearance: Cloudy   Bilirubin: Negative   Ketone - Urine: Negative mg/dL   Specific Gravity: 1.013   Protein, Urine: Trace mg/dL   Urobilinogen: 1.0 mg/dL   Nitrite: Positive   Leukocyte Esterase Concentration: Moderate    Culture - Urine (03.11.24 @ 22:00)    Specimen Source: Clean Catch Clean Catch (Midstream)   Culture Results:   >100,000 CFU/ml Escherichia coli      Radiology:  < from: CT Abdomen and Pelvis w/wo IV Cont (03.12.24 @ 02:53) >  INTERPRETATION:  CLINICAL INFORMATION: post op failure to thrive- small   bowel fistula    COMPARISON: CT urogram 3/5/2024, CT cystogram 2/20/2024    CONTRAST/COMPLICATIONS:  IV Contrast: Omnipaque 350  90 cc administered   10 cc discarded  Oral Contrast: NONE  Complications: None reported at time of study completion    PROCEDURE:  CT of the Abdomen and Pelvis was performed (CT Cystogram).  Precontrast images were obtained followed by imaging after the   installation of a dilute mixture of [id number] via a Desai catheter.  Sagittal and coronal reformats were performed.    FINDINGS:  LOWER CHEST: Stable trace loculated right pleural effusion and right   basilar scarring.    LIVER: Normal.  BILE DUCTS: Nondilated.  GALLBLADDER: Normal.  SPLEEN: Normal.  PANCREAS: Normal.  ADRENALS: Normal.  KIDNEYS/URETERS: No calculi, hydronephrosis, or renal mass.    BLADDER: Desai catheter in the lumen. Adequate filling of contrast in the   bladder lumen without evidence for extraluminal leakage. No filling   defect.  REPRODUCTIVE ORGANS: Status post hysterectomy.    BOWEL: Status post Morfin's and left lower quadrant colostomy. No bowel   obstruction. Persistent matted small bowel loops in the pelvis. Air gap   interrupting the suture line at the rectal stump with pocket of   extraluminal air just anteriorly in the pelvis and additional air more   anteriorly along the abdominal wall with a few droplets entering the skin   incision.  PERITONEUM: Decreased fluid and increased air within the right paracolic   gutter collection. Increased fluid within the right retroperitoneal   collection. Increased fluid within the left paracolic gutter collection.   Decreased fluid and increased air within the anterior pelvic collection.  VESSELS: Normal caliber aorta.  RETROPERITONEUM/LYMPH NODES: No adenopathy.  ABDOMINAL WALL: Postsurgical changes. No drainable collection.  BONES: No aggressive lesion.    IMPRESSION:  *  Resolution of bladder leak.  *  Suspected tissue breakdown at the rectal stump suture line with air   entering the pelvic peritoneal cavity as well as into the anterior   abdominal wall incision. Previously on CT cystogram, there was leaked   contrast from the bladder entering the rectal stump indicating this has   been present since at least 2/20/2024 exam.  *  Increasing air-fluid collections in the paracolic gutters and anterior   pelvis.    The above findings of free air from the rectal stump represent a change   from the preliminary interpretation of no bladder leak and were discussed   with Dr. Perez 3/12/2024 8:02 AM by Dr. Romero with read back confirmation.    --- End of Report ---    < end of copied text >  
[facility]  INFECTIOUS DISEASES at [place] / [place] / [place]  =======================================================                               Nilo Fields MD#   Chau Francisco MD*                             Alda Swann MD*   Barbie Sexton MD*                              Professor Emeritus:  Dr Pierre Banuelos MD^            Diplomates American Board of Internal Medicine & Infectious Diseases                # [place] Office - Appt - Tel  [phone] Fax [phone]                * [place] Office - Appt - Tel [phone] Fax [phone]                      ^[place] Office - Tel  [phone] Fax [phone]                                  Hospital Consult line:  [phone]  =======================================================    PENELOPE FLAHERTY [id number]    Follow up:      Allergies:  Milk (Nausea)  paclitaxel (Flushing; Other (Severe))       REVIEW OF SYSTEMS:  CONSTITUTIONAL:  No Fever or chills  HEENT:   No diplopia or blurred vision.  No earache, sore throat or runny nose.  CARDIOVASCULAR:  No Chest Pain  RESPIRATORY:  No cough, shortness of breath  GASTROINTESTINAL:  No nausea, vomiting or diarrhea.  GENITOURINARY:  No dysuria, frequency or urgency. No Blood in urine  MUSCULOSKELETAL:  no joint aches, no muscle pain  SKIN:  No change in skin, hair or nails.  NEUROLOGIC:  No Headaches      Physical Exam:  GEN: NAD  HEENT: normocephalic and atraumatic. EOMI. PERRL.    NECK: Supple.   LUNGS: CTA B/L.  HEART: RRR  ABDOMEN: Soft, NT, ND.  +BS.  + Drain  : No CVA tenderness  EXTREMITIES: Without  edema.  MSK: No joint swelling  NEUROLOGIC: No Focal Deficits   SKIN: No rash      Vitals:  T(F): 98.5 (16 Mar 2024 04:16), Max: 98.5 (15 Mar 2024 09:43)  HR: 78 (16 Mar 2024 04:16)  BP: 116/80 (16 Mar 2024 04:16)  RR: 18 (16 Mar 2024 04:16)  SpO2: 94% (16 Mar 2024 04:16) (93% - 95%)  temp max in last 48H T(F): , Max: 98.5 (03-15-24 @ 09:43)    Current Antibiotics:    Other medications:  acetaminophen     Tablet .. 975 milliGRAM(s) Oral every 6 hours  atorvastatin 80 milliGRAM(s) Oral at bedtime  furosemide    Tablet 40 milliGRAM(s) Oral daily  heparin   Injectable 5000 Unit(s) SubCutaneous every 8 hours  lidocaine   4% Patch 1 Patch Transdermal every 24 hours  metoprolol succinate ER 50 milliGRAM(s) Oral daily  sodium chloride 0.9% with potassium chloride 20 mEq/L 1000 milliLiter(s) IV Continuous <Continuous>                            8.8    9.73  )-----------( 397      ( 16 Mar 2024 05:41 )             28.0     03-15    133<L>  |  94<L>  |  6.8<L>  ----------------------------<  88  3.6   |  26.0  |  0.62    Ca    8.1<L>      15 Mar 2024 14:23  Phos  4.1     03-15  Mg     2.0     03-15      RECENT CULTURES:  03-15 @ 19:50 .Abscess left flank     Moderate polymorphonuclear leukocytes seen per low power field  Moderate Gram positive cocci in pairs seen per oil power field    03-13 @ 17:10 .Genital Vaginal     Normal genital sonja isolated    03-11 @ 22:00 Clean Catch Clean Catch (Midstream) Escherichia coli  Enterococcus faecalis    >100,000 CFU/ml Escherichia coli  >100,000 CFU/ml Enterococcus faecalis      WBC Count: 9.73 K/uL (03-16-24 @ 05:41)  WBC Count: 13.80 K/uL (03-15-24 @ 05:14)  WBC Count: 14.36 K/uL (03-14-24 @ 05:53)  WBC Count: 14.86 K/uL (03-13-24 @ 05:58)  WBC Count: 14.27 K/uL (03-12-24 @ 06:18)  WBC Count: 14.29 K/uL (03-11-24 @ 17:51)    Creatinine: 0.62 mg/dL (03-15-24 @ 14:23)  Creatinine: 0.66 mg/dL (03-15-24 @ 05:14)  Creatinine: 0.65 mg/dL (03-14-24 @ 05:53)  Creatinine: 0.67 mg/dL (03-13-24 @ 05:58)  Creatinine: 0.79 mg/dL (03-12-24 @ 06:18)  Creatinine: 1.26 mg/dL (03-11-24 @ 17:51)    Urinalysis (03.11.24 @ 22:00)    pH Urine: 6.0   Glucose Qualitative, Urine: Negative mg/dL   Blood, Urine: Moderate   Color: Yellow   Urine Appearance: Cloudy   Bilirubin: Negative   Ketone - Urine: Negative mg/dL   Specific Gravity: 1.013   Protein, Urine: Trace mg/dL   Urobilinogen: 1.0 mg/dL   Nitrite: Positive   Leukocyte Esterase Concentration: Moderate  Urine Microscopic-Add On (NC) (03.11.24 @ 22:00)    Bacteria: Many /HPF   Epithelial Cells: 0 /HPF   Cast: 5 /LPF   Red Blood Cell - Urine: 6 /HPF   White Blood Cell - Urine: 101 /HPF           < from: CT Abdomen and Pelvis w/ Oral Cont and w/ IV Cont (03.14.24 @ 15:36) >  ACC: [id number] EXAM:  CT ABDOMEN AND PELVIS OC IC   ORDERED BY: JEREMY GARCIA     PROCEDURE DATE:  03/14/2024          INTERPRETATION:  CLINICAL INFORMATION: Evaluate ostomy, rule out GI bleed   or fistula.    COMPARISON: CT 3/12/2024, 3/5/2024, 2/20/2024    CONTRAST/COMPLICATIONS:  IV Contrast: Omnipaque 350  90 cc administered   10 cc discarded  Oral Contrast: Gastroview  Complications: None reported at time of study completion    PROCEDURE:  CT of the Abdomen and Pelvis was performed.  Sagittal and coronal reformats were performed.    FINDINGS:  LOWER CHEST: Trace right pleural effusion with suspected loculation.   Adjacent right basilar atelectasis/scarring.    LIVER: Within normal limits.  BILE DUCTS: Normal caliber.  GALLBLADDER: Within normal limits.  SPLEEN: Within normal limits.  PANCREAS: Within normal limits.  ADRENALS: Within normal limits.  KIDNEYS/URETERS: Symmetric enhancement. No hydronephrosis.    BLADDER: Collapsed with Doe catheter.  REPRODUCTIVE ORGANS: Hysterectomy.    BOWEL: Status post partial colectomy with left lower quadrant colostomy   and Morfin stump. No bowel obstruction; oral contrast opacifies bowel to   the level of the distal colon. Clustered small bowel loops are again seen   in the pelvis. Rectal stump is unchanged in appearance.  PERITONEUM: Multiple peritoneal and retroperitoneal collections are   without interval change compared to the immediate prior CT 3/12/2024,   including a right retroperitoneal fluid collection, and left   extraperitoneal paracolic gutter collection, a thin gas-filled collection   along the right paracolic gutter, and a small gas-filled collection in   the anterior pelvis. A small collection adjacent to the posterior vaginal   cuff and at the rectal stump demonstrates a small locule of gas and is   contiguous with the posterior vaginal cuff and rectal stump (4-72),   concerning for developing rectovaginal fistula.  VESSELS: Within normal limits.  RETROPERITONEUM/LYMPH NODES: No lymphadenopathy.  ABDOMINAL WALL: Postsurgical changes. Slightly increased gas in the   anterior abdominal wall deep to the sutures, likely in continuity with   subjacent pelvic collection.  BONES: Within normal limits.    IMPRESSION:  Small collection in the pelvis is contiguouswith the posterior vaginal   cuff and rectal stump as described above, concerning for a developing   rectovaginal fistula.    Postsurgical changes of Dayana's procedure and hysterectomy with   multiple peritoneal and retroperitoneal collections as described above   without significant interval change from the immediate prior CT 3/12/2024.    No bowel obstruction; oral contrast opacifies bowel to the level of the   distal colon. GI bleed cannot be excluded on a single phase CT in the   setting of positive oral contrast.    Other findings as above.        --- End of Report ---    < end of copied text >        
PENELOPE FLAHERTY is a 58y now POD#36 s/p ex-lap AZALIA BSO, cytoreductive surgery HIPEC, diverting ostomy creation, cystotomy with repair who presented with nausea, vomiting, and failure to thrive with concern for enterovesical fistula. Admission imaging showed no enterovesical fistula. Now with concern for intraabdominal infection and/or pyelonephritis.   HD4    SUBJECTIVE:  Reports flank and suprapubic pain persists.  Vaginal discharge noted yesterday persists.  Tolerating FLD.  Denies fevers, chills, nausea, vomiting.     ICU Vital Signs Last 24 Hrs  T(C): 36.5 (14 Mar 2024 04:50), Max: 37.1 (13 Mar 2024 20:20)  T(F): 97.7 (14 Mar 2024 04:50), Max: 98.7 (13 Mar 2024 20:20)  HR: 76 (14 Mar 2024 04:50) (76 - 87)  BP: 122/76 (14 Mar 2024 04:50) (122/76 - 135/84)  RR: 18 (14 Mar 2024 04:50) (17 - 18)  SpO2: 96% (14 Mar 2024 04:50) (92% - 96%)    O2 Parameters below as of 14 Mar 2024 04:50  Patient On (Oxygen Delivery Method): room air    Physical Exam:  Gen: NAD, AAOx3  Resp: breathing comfortably on RA  Abdomen: soft, nondistended, significant suprapubic tenderness, continued right CVA tenderness, healed midline laparotomy scar  GI: ostomy pink, +light brown stool output, +bright red mucous at ostomy site  : desai in place draining clear yellow urine  Ext: no LE tenderness/swelling    LABS:                        7.1    14.86 )-----------( 439      ( 13 Mar 2024 05:58 )             22.5     03-13    136  |  96  |  6.5<L>  ----------------------------<  95  3.8   |  28.0  |  0.67    Ca    8.3<L>      13 Mar 2024 05:58  Phos  3.9     03-13  Mg     1.6     03-13    TPro  7.0  /  Alb  2.6<L>  /  TBili  0.4  /  DBili  x   /  AST  11  /  ALT  7   /  AlkPhos  154<H>  03-11    Culture - Urine (03.11.24 @ 22:00)    Specimen Source: Clean Catch Clean Catch (Midstream)   Culture Results:   >100,000 CFU/ml Escherichia coli    Bacterial Vaginosis Panel . (03.13.24 @ 17:20)    Trichomonas Vaginalis Affirm: NotDetec   Gardnerella Vaginalis Affirm: NotDetec   Candida Species Affirm: NotDetec    Radiology:  < from: CT Abdomen and Pelvis w/wo IV Cont (03.12.24 @ 02:53) >  INTERPRETATION:  CLINICAL INFORMATION: post op failure to thrive- small   bowel fistula    COMPARISON: CT urogram 3/5/2024, CT cystogram 2/20/2024    CONTRAST/COMPLICATIONS:  IV Contrast: Omnipaque 350  90 cc administered   10 cc discarded  Oral Contrast: NONE  Complications: None reported at time of study completion    PROCEDURE:  CT of the Abdomen and Pelvis was performed (CT Cystogram).  Precontrast images were obtained followed by imaging after the   installation of a dilute mixture of [id number] via a Desai catheter.  Sagittal and coronal reformats were performed.    FINDINGS:  LOWER CHEST: Stable trace loculated right pleural effusion and right   basilar scarring.    LIVER: Normal.  BILE DUCTS: Nondilated.  GALLBLADDER: Normal.  SPLEEN: Normal.  PANCREAS: Normal.  ADRENALS: Normal.  KIDNEYS/URETERS: No calculi, hydronephrosis, or renal mass.    BLADDER: Desai catheter in the lumen. Adequate filling of contrast in the   bladder lumen without evidence for extraluminal leakage. No filling   defect.  REPRODUCTIVE ORGANS: Status post hysterectomy.    BOWEL: Status post Morfin's and left lower quadrant colostomy. No bowel   obstruction. Persistent matted small bowel loops in the pelvis. Air gap   interrupting the suture line at the rectal stump with pocket of   extraluminal air just anteriorly in the pelvis and additional air more   anteriorly along the abdominal wall with a few droplets entering the skin   incision.  PERITONEUM: Decreased fluid and increased air within the right paracolic   gutter collection. Increased fluid within the right retroperitoneal   collection. Increased fluid within the left paracolic gutter collection.   Decreased fluid and increased air within the anterior pelvic collection.  VESSELS: Normal caliber aorta.  RETROPERITONEUM/LYMPH NODES: No adenopathy.  ABDOMINAL WALL: Postsurgical changes. No drainable collection.  BONES: No aggressive lesion.    IMPRESSION:  *  Resolution of bladder leak.  *  Suspected tissue breakdown at the rectal stump suture line with air   entering the pelvic peritoneal cavity as well as into the anterior   abdominal wall incision. Previously on CT cystogram, there was leaked   contrast from the bladder entering the rectal stump indicating this has   been present since at least 2/20/2024 exam.  *  Increasing air-fluid collections in the paracolic gutters and anterior   pelvis.    The above findings of free air from the rectal stump represent a change   from the preliminary interpretation of no bladder leak and were discussed   with Dr. Perez 3/12/2024 8:02 AM by Dr. Romero with read back confirmation.    --- End of Report ---    < end of copied text >  
[facility]  INFECTIOUS DISEASES at [place] / [place] / [place]  =======================================================                               Nilo Fields MD#   Chau Francisco MD*                             Alda Swann MD*   Barbie Sexton MD*                              Professor Emeritus:  Dr Pierre Banuelos MD^            Diplomates American Board of Internal Medicine & Infectious Diseases                # [place] Office - Appt - Tel  [phone] Fax [phone]                * [place] Office - Appt - Tel [phone] Fax [phone]                      ^[place] Office - Tel  [phone] Fax [phone]                                  Hospital Consult line:  [phone]  =======================================================    SEMAJALBERTA PENELOPE [id number]    Follow up:  Left flank abscess    No fevers       Allergies:  Milk (Nausea)  paclitaxel (Flushing; Other (Severe))       REVIEW OF SYSTEMS:  CONSTITUTIONAL:  No Fever or chills  HEENT:   No diplopia or blurred vision.  No earache, sore throat or runny nose.  CARDIOVASCULAR:  No Chest Pain  RESPIRATORY:  No cough, shortness of breath  GASTROINTESTINAL:  No nausea, vomiting or diarrhea.  GENITOURINARY:  No dysuria, frequency or urgency. No Blood in urine  MUSCULOSKELETAL:  no joint aches, no muscle pain  SKIN:  No change in skin, hair or nails.  NEUROLOGIC:  No Headaches      Physical Exam:  GEN: NAD  HEENT: normocephalic and atraumatic. EOMI. PERRL.    NECK: Supple.   LUNGS: CTA B/L.  HEART: RRR  ABDOMEN: Soft, NT, ND.  +BS.  + Drain OSTOMY IN PLACE   : No CVA tenderness  EXTREMITIES: Without  edema.  MSK: No joint swelling  NEUROLOGIC: No Focal Deficits   SKIN: No rash      Vitals:   Vital Signs Last 24 Hrs  T(C): 36.7 (18 Mar 2024 10:09), Max: 37 (17 Mar 2024 21:15)  T(F): 98 (18 Mar 2024 10:09), Max: 98.6 (17 Mar 2024 21:15)  HR: 83 (18 Mar 2024 10:09) (78 - 92)  BP: 126/80 (18 Mar 2024 10:09) (114/73 - 126/80)  BP(mean): --  RR: 18 (18 Mar 2024 10:09) (18 - 18)  SpO2: 96% (18 Mar 2024 10:09) (96% - 97%)    Parameters below as of 18 Mar 2024 10:09  Patient On (Oxygen Delivery Method): room air        Current Antibiotics:  piperacillin/tazobactam IVPB.. 3.375 Gram(s) IV Intermittent every 8 hours    Other medications:  acetaminophen     Tablet .. 975 milliGRAM(s) Oral every 6 hours  atorvastatin 80 milliGRAM(s) Oral at bedtime  furosemide    Tablet 40 milliGRAM(s) Oral daily  heparin   Injectable 5000 Unit(s) SubCutaneous every 8 hours  lidocaine   4% Patch 1 Patch Transdermal every 24 hours  metoprolol succinate ER 50 milliGRAM(s) Oral daily  sodium chloride 0.9% with potassium chloride 20 mEq/L 1000 milliLiter(s) IV Continuous <Continuous>                                              8.4    8.90  )-----------( 353      ( 18 Mar 2024 06:24 )             27.2         RECENT CULTURES:  03-15 @ 19:50 .Abscess left flank     Moderate polymorphonuclear leukocytes seen per low power field  Moderate Gram positive cocci in pairs seen per oil power field    03-13 @ 17:10 .Genital Vaginal     Normal genital sonja isolated    03-11 @ 22:00 Clean Catch Clean Catch (Midstream) Escherichia coli  Enterococcus faecalis    >100,000 CFU/ml Escherichia coli  >100,000 CFU/ml Enterococcus faecalis      WBC Count: 7.76 K/uL (03-17-24 @ 05:23)  WBC Count: 9.73 K/uL (03-16-24 @ 05:41)  WBC Count: 13.80 K/uL (03-15-24 @ 05:14)  WBC Count: 14.36 K/uL (03-14-24 @ 05:53)  WBC Count: 14.86 K/uL (03-13-24 @ 05:58)    Creatinine: 0.62 mg/dL (03-15-24 @ 14:23)  Creatinine: 0.66 mg/dL (03-15-24 @ 05:14)  Creatinine: 0.65 mg/dL (03-14-24 @ 05:53)  Creatinine: 0.67 mg/dL (03-13-24 @ 05:58)      Urinalysis (03.11.24 @ 22:00)    pH Urine: 6.0   Glucose Qualitative, Urine: Negative mg/dL   Blood, Urine: Moderate   Color: Yellow   Urine Appearance: Cloudy   Bilirubin: Negative   Ketone - Urine: Negative mg/dL   Specific Gravity: 1.013   Protein, Urine: Trace mg/dL   Urobilinogen: 1.0 mg/dL   Nitrite: Positive   Leukocyte Esterase Concentration: Moderate  Urine Microscopic-Add On (NC) (03.11.24 @ 22:00)    Bacteria: Many /HPF   Epithelial Cells: 0 /HPF   Cast: 5 /LPF   Red Blood Cell - Urine: 6 /HPF   White Blood Cell - Urine: 101 /HPF           < from: CT Abdomen and Pelvis w/ Oral Cont and w/ IV Cont (03.14.24 @ 15:36) >  ACC: [id number] EXAM:  CT ABDOMEN AND PELVIS OC IC   ORDERED BY: JEREMY GARCIA     PROCEDURE DATE:  03/14/2024      INTERPRETATION:  CLINICAL INFORMATION: Evaluate ostomy, rule out GI bleed   or fistula.    COMPARISON: CT 3/12/2024, 3/5/2024, 2/20/2024    CONTRAST/COMPLICATIONS:  IV Contrast: Omnipaque 350  90 cc administered   10 cc discarded  Oral Contrast: Gastroview  Complications: None reported at time of study completion    PROCEDURE:  CT of the Abdomen and Pelvis was performed.  Sagittal and coronal reformats were performed.    FINDINGS:  LOWER CHEST: Trace right pleural effusion with suspected loculation.   Adjacent right basilar atelectasis/scarring.    LIVER: Within normal limits.  BILE DUCTS: Normal caliber.  GALLBLADDER: Within normal limits.  SPLEEN: Within normal limits.  PANCREAS: Within normal limits.  ADRENALS: Within normal limits.  KIDNEYS/URETERS: Symmetric enhancement. No hydronephrosis.    BLADDER: Collapsed with Doe catheter.  REPRODUCTIVE ORGANS: Hysterectomy.    BOWEL: Status post partial colectomy with left lower quadrant colostomy   and Morfin stump. No bowel obstruction; oral contrast opacifies bowel to   the level of the distal colon. Clustered small bowel loops are again seen   in the pelvis. Rectal stump is unchanged in appearance.  PERITONEUM: Multiple peritoneal and retroperitoneal collections are   without interval change compared to the immediate prior CT 3/12/2024,   including a right retroperitoneal fluid collection, and left   extraperitoneal paracolic gutter collection, a thin gas-filled collection   along the right paracolic gutter, and a small gas-filled collection in   the anterior pelvis. A small collection adjacent to the posterior vaginal   cuff and at the rectal stump demonstrates a small locule of gas and is   contiguous with the posterior vaginal cuff and rectal stump (4-72),   concerning for developing rectovaginal fistula.  VESSELS: Within normal limits.  RETROPERITONEUM/LYMPH NODES: No lymphadenopathy.  ABDOMINAL WALL: Postsurgical changes. Slightly increased gas in the   anterior abdominal wall deep to the sutures, likely in continuity with   subjacent pelvic collection.  BONES: Within normal limits.    IMPRESSION:  Small collection in the pelvis is contiguous with the posterior vaginal   cuff and rectal stump as described above, concerning for a developing   rectovaginal fistula.    Postsurgical changes of Dayana's procedure and hysterectomy with   multiple peritoneal and retroperitoneal collections as described above   without significant interval change from the immediate prior CT 3/12/2024.    No bowel obstruction; oral contrast opacifies bowel to the level of the   distal colon. GI bleed cannot be excluded on a single phase CT in the   setting of positive oral contrast.    Other findings as above.    --- End of Report ---  < end of copied text >        
GYNECOLOGIC ONCOLOGY PROGRESS NOTE    SUBJECTIVE:  Patient is without complaints.  Flatus: Yes   Denies Fevers, Chills, Nausea, Vomiting or Diarrhea.  Tolerating diet.    OBJECTIVE:     VITALS:  T(F): 97.8 (03-17-24 @ 04:41), Max: 99.1 (03-16-24 @ 18:40)  HR: 80 (03-17-24 @ 04:41) (75 - 87)  BP: 124/75 (03-17-24 @ 04:41) (117/71 - 127/72)  RR: 17 (03-17-24 @ 04:41) (16 - 18)  SpO2: 97% (03-17-24 @ 04:41) (95% - 97%)    I&O's Summary    15 Mar 2024 07:01  -  16 Mar 2024 07:00  --------------------------------------------------------  IN: 1100 mL / OUT: 2050 mL / NET: -950 mL    16 Mar 2024 07:01  -  17 Mar 2024 05:02  --------------------------------------------------------  IN: 3120 mL / OUT: 1435 mL / NET: 1685 mL      MEDICATIONS  (STANDING):  acetaminophen     Tablet .. 975 milliGRAM(s) Oral every 6 hours  atorvastatin 80 milliGRAM(s) Oral at bedtime  furosemide    Tablet 40 milliGRAM(s) Oral daily  heparin   Injectable 5000 Unit(s) SubCutaneous every 8 hours  lidocaine   4% Patch 1 Patch Transdermal every 24 hours  metoprolol succinate ER 50 milliGRAM(s) Oral daily  piperacillin/tazobactam IVPB.. 3.375 Gram(s) IV Intermittent every 8 hours  sodium chloride 0.9% with potassium chloride 20 mEq/L 1000 milliLiter(s) (100 mL/Hr) IV Continuous <Continuous>    MEDICATIONS  (PRN):  acetaminophen     Tablet .. 650 milliGRAM(s) Oral every 6 hours PRN Temp greater or equal to 38C (100.4F), Mild Pain (1 - 3), Moderate Pain (4 - 6), Severe Pain (7 - 10)    Physical Exam:  Gen: NAD, AAOx3  Resp: breathing comfortably on RA  Abdomen: soft, nondistended, significant suprapubic tenderness, continued right CVA tenderness, healed midline laparotomy scar, TANYA in place with minimal purulent output   GI: ostomy pink, +light brown stool output, no blood  Ext: no LE tenderness/swelling    LABS:                        8.8    9.73  )-----------( 397      ( 16 Mar 2024 05:41 )             28.0     03-15    133<L>  |  94<L>  |  6.8<L>  ----------------------------<  88  3.6   |  26.0  |  0.62    Ca    8.1<L>      15 Mar 2024 14:23  Phos  4.1     03-15  Mg     2.0     03-15        Urinalysis Basic - ( 15 Mar 2024 14:23 )    Color: x / Appearance: x / SG: x / pH: x  Gluc: 88 mg/dL / Ketone: x  / Bili: x / Urobili: x   Blood: x / Protein: x / Nitrite: x   Leuk Esterase: x / RBC: x / WBC x   Sq Epi: x / Non Sq Epi: x / Bacteria: x    Culture - Abscess with Gram Stain (03.15.24 @ 19:50)    Gram Stain:   Moderate polymorphonuclear leukocytes seen per low power field  Moderate Gram positive cocci in pairs seen per oil power field   Specimen Source: .Abscess left flank              
PENELOPE FLAHERTY is a 58y now POD#37 s/p ex-lap AZALIA BSO, cytoreductive surgery HIPEC, diverting ostomy creation, cystotomy with repair who presented with nausea, vomiting, and failure to thrive with concern for enterovesical fistula. Admission imaging showed no enterovesical fistula. Now with concern for intraabdominal infection and/or pyelonephritis.   HD5    SUBJECTIVE:  Reports flank and suprapubic pain persists.  Vaginal discharge noted yesterday persists.  Tolerating regular diet.   Denies fevers, chills, nausea, vomiting.     ICU Vital Signs Last 24 Hrs  T(C): 36.8 (15 Mar 2024 04:27), Max: 36.8 (14 Mar 2024 10:42)  T(F): 98.2 (15 Mar 2024 04:27), Max: 98.2 (14 Mar 2024 10:42)  HR: 82 (15 Mar 2024 04:27) (71 - 82)  BP: 131/75 (15 Mar 2024 04:27) (131/74 - 136/81)  RR: 18 (15 Mar 2024 04:27) (18 - 18)  SpO2: 95% (15 Mar 2024 04:27) (91% - 96%)    O2 Parameters below as of 15 Mar 2024 04:27  Patient On (Oxygen Delivery Method): room air            Physical Exam:  Gen: NAD, AAOx3  Resp: breathing comfortably on RA  Abdomen: soft, nondistended, significant suprapubic tenderness, continued right CVA tenderness, healed midline laparotomy scar  GI: ostomy pink, +light brown stool output, no blood  : desai in place draining clear yellow urine  Ext: no LE tenderness/swelling    LABS:                        7.1    14.86 )-----------( 439      ( 13 Mar 2024 05:58 )             22.5                           8.8    13.80 )-----------( 429      ( 15 Mar 2024 05:14 )             27.2       03-13    136  |  96  |  6.5<L>  ----------------------------<  95  3.8   |  28.0  |  0.67    Ca    8.3<L>      13 Mar 2024 05:58  Phos  3.9     03-13  Mg     1.6     03-13    TPro  7.0  /  Alb  2.6<L>  /  TBili  0.4  /  DBili  x   /  AST  11  /  ALT  7   /  AlkPhos  154<H>  03-11 03-15    132<L>  |  94<L>  |  6.2<L>  ----------------------------<  79  3.7   |  25.0  |  0.66    Ca    8.3<L>      15 Mar 2024 05:14  Phos  4.1     03-15  Mg     2.0     03-15      Culture - Urine (03.11.24 @ 22:00)    Specimen Source: Clean Catch Clean Catch (Midstream)   Culture Results:   >100,000 CFU/ml Escherichia coli    Bacterial Vaginosis Panel . (03.13.24 @ 17:20)    Trichomonas Vaginalis Affirm: NotDetec   Gardnerella Vaginalis Affirm: NotDetec   Candida Species Affirm: NotDetec    Radiology:  < from: CT Abdomen and Pelvis w/wo IV Cont (03.12.24 @ 02:53) >  INTERPRETATION:  CLINICAL INFORMATION: post op failure to thrive- small   bowel fistula    COMPARISON: CT urogram 3/5/2024, CT cystogram 2/20/2024    CONTRAST/COMPLICATIONS:  IV Contrast: Omnipaque 350  90 cc administered   10 cc discarded  Oral Contrast: NONE  Complications: None reported at time of study completion    PROCEDURE:  CT of the Abdomen and Pelvis was performed (CT Cystogram).  Precontrast images were obtained followed by imaging after the   installation of a dilute mixture of [id number] via a Desai catheter.  Sagittal and coronal reformats were performed.    FINDINGS:  LOWER CHEST: Stable trace loculated right pleural effusion and right   basilar scarring.    LIVER: Normal.  BILE DUCTS: Nondilated.  GALLBLADDER: Normal.  SPLEEN: Normal.  PANCREAS: Normal.  ADRENALS: Normal.  KIDNEYS/URETERS: No calculi, hydronephrosis, or renal mass.    BLADDER: Desai catheter in the lumen. Adequate filling of contrast in the   bladder lumen without evidence for extraluminal leakage. No filling   defect.  REPRODUCTIVE ORGANS: Status post hysterectomy.    BOWEL: Status post Morfin's and left lower quadrant colostomy. No bowel   obstruction. Persistent matted small bowel loops in the pelvis. Air gap   interrupting the suture line at the rectal stump with pocket of   extraluminal air just anteriorly in the pelvis and additional air more   anteriorly along the abdominal wall with a few droplets entering the skin   incision.  PERITONEUM: Decreased fluid and increased air within the right paracolic   gutter collection. Increased fluid within the right retroperitoneal   collection. Increased fluid within the left paracolic gutter collection.   Decreased fluid and increased air within the anterior pelvic collection.  VESSELS: Normal caliber aorta.  RETROPERITONEUM/LYMPH NODES: No adenopathy.  ABDOMINAL WALL: Postsurgical changes. No drainable collection.  BONES: No aggressive lesion.    IMPRESSION:  *  Resolution of bladder leak.  *  Suspected tissue breakdown at the rectal stump suture line with air   entering the pelvic peritoneal cavity as well as into the anterior   abdominal wall incision. Previously on CT cystogram, there was leaked   contrast from the bladder entering the rectal stump indicating this has   been present since at least 2/20/2024 exam.  *  Increasing air-fluid collections in the paracolic gutters and anterior   pelvis.    The above findings of free air from the rectal stump represent a change   from the preliminary interpretation of no bladder leak and were discussed   with Dr. Perez 3/12/2024 8:02 AM by Dr. Romero with read back confirmation.    --- End of Report ---    < end of copied text >  
[facility]  INFECTIOUS DISEASES at [place] / [place] / [place]  =======================================================                               Nilo Fields MD#   Chau Francisco MD*                             Alda Swann MD*   Barbie Sexton MD*                              Professor Emeritus:  Dr Pierre Banuelos MD^            Diplomates American Board of Internal Medicine & Infectious Diseases                # [place] Office - Appt - Tel  [phone] Fax [phone]                * [place] Office - Appt - Tel [phone] Fax [phone]                      ^[place] Office - Tel  [phone] Fax [phone]                                  Hospital Consult line:  [phone]  =======================================================    SEMAJALBERTA PENELOPE [id number]    Follow up:  Left flank abscess    No fevers       Allergies:  Milk (Nausea)  paclitaxel (Flushing; Other (Severe))       REVIEW OF SYSTEMS:  CONSTITUTIONAL:  No Fever or chills  HEENT:   No diplopia or blurred vision.  No earache, sore throat or runny nose.  CARDIOVASCULAR:  No Chest Pain  RESPIRATORY:  No cough, shortness of breath  GASTROINTESTINAL:  No nausea, vomiting or diarrhea.  GENITOURINARY:  No dysuria, frequency or urgency. No Blood in urine  MUSCULOSKELETAL:  no joint aches, no muscle pain  SKIN:  No change in skin, hair or nails.  NEUROLOGIC:  No Headaches      Physical Exam:  GEN: NAD  HEENT: normocephalic and atraumatic. EOMI. PERRL.    NECK: Supple.   LUNGS: CTA B/L.  HEART: RRR  ABDOMEN: Soft, NT, ND.  +BS.  + Drain  : No CVA tenderness  EXTREMITIES: Without  edema.  MSK: No joint swelling  NEUROLOGIC: No Focal Deficits   SKIN: No rash      Vitals:  T(F): 97.8 (17 Mar 2024 04:41), Max: 99.1 (16 Mar 2024 18:40)  HR: 80 (17 Mar 2024 04:41)  BP: 124/75 (17 Mar 2024 04:41)  RR: 17 (17 Mar 2024 04:41)  SpO2: 97% (17 Mar 2024 04:41) (95% - 97%)  temp max in last 48H T(F): , Max: 99.1 (03-16-24 @ 18:40)    Current Antibiotics:  piperacillin/tazobactam IVPB.. 3.375 Gram(s) IV Intermittent every 8 hours    Other medications:  acetaminophen     Tablet .. 975 milliGRAM(s) Oral every 6 hours  atorvastatin 80 milliGRAM(s) Oral at bedtime  furosemide    Tablet 40 milliGRAM(s) Oral daily  heparin   Injectable 5000 Unit(s) SubCutaneous every 8 hours  lidocaine   4% Patch 1 Patch Transdermal every 24 hours  metoprolol succinate ER 50 milliGRAM(s) Oral daily  sodium chloride 0.9% with potassium chloride 20 mEq/L 1000 milliLiter(s) IV Continuous <Continuous>                            8.8    7.76  )-----------( 404      ( 17 Mar 2024 05:23 )             29.3     03-15    133<L>  |  94<L>  |  6.8<L>  ----------------------------<  88  3.6   |  26.0  |  0.62    Ca    8.1<L>      15 Mar 2024 14:23      RECENT CULTURES:  03-15 @ 19:50 .Abscess left flank     Moderate polymorphonuclear leukocytes seen per low power field  Moderate Gram positive cocci in pairs seen per oil power field    03-13 @ 17:10 .Genital Vaginal     Normal genital sonja isolated    03-11 @ 22:00 Clean Catch Clean Catch (Midstream) Escherichia coli  Enterococcus faecalis    >100,000 CFU/ml Escherichia coli  >100,000 CFU/ml Enterococcus faecalis      WBC Count: 7.76 K/uL (03-17-24 @ 05:23)  WBC Count: 9.73 K/uL (03-16-24 @ 05:41)  WBC Count: 13.80 K/uL (03-15-24 @ 05:14)  WBC Count: 14.36 K/uL (03-14-24 @ 05:53)  WBC Count: 14.86 K/uL (03-13-24 @ 05:58)    Creatinine: 0.62 mg/dL (03-15-24 @ 14:23)  Creatinine: 0.66 mg/dL (03-15-24 @ 05:14)  Creatinine: 0.65 mg/dL (03-14-24 @ 05:53)  Creatinine: 0.67 mg/dL (03-13-24 @ 05:58)      Urinalysis (03.11.24 @ 22:00)    pH Urine: 6.0   Glucose Qualitative, Urine: Negative mg/dL   Blood, Urine: Moderate   Color: Yellow   Urine Appearance: Cloudy   Bilirubin: Negative   Ketone - Urine: Negative mg/dL   Specific Gravity: 1.013   Protein, Urine: Trace mg/dL   Urobilinogen: 1.0 mg/dL   Nitrite: Positive   Leukocyte Esterase Concentration: Moderate  Urine Microscopic-Add On (NC) (03.11.24 @ 22:00)    Bacteria: Many /HPF   Epithelial Cells: 0 /HPF   Cast: 5 /LPF   Red Blood Cell - Urine: 6 /HPF   White Blood Cell - Urine: 101 /HPF           < from: CT Abdomen and Pelvis w/ Oral Cont and w/ IV Cont (03.14.24 @ 15:36) >  ACC: [id number] EXAM:  CT ABDOMEN AND PELVIS OC IC   ORDERED BY: JEREMY GARCIA     PROCEDURE DATE:  03/14/2024      INTERPRETATION:  CLINICAL INFORMATION: Evaluate ostomy, rule out GI bleed   or fistula.    COMPARISON: CT 3/12/2024, 3/5/2024, 2/20/2024    CONTRAST/COMPLICATIONS:  IV Contrast: Omnipaque 350  90 cc administered   10 cc discarded  Oral Contrast: Gastroview  Complications: None reported at time of study completion    PROCEDURE:  CT of the Abdomen and Pelvis was performed.  Sagittal and coronal reformats were performed.    FINDINGS:  LOWER CHEST: Trace right pleural effusion with suspected loculation.   Adjacent right basilar atelectasis/scarring.    LIVER: Within normal limits.  BILE DUCTS: Normal caliber.  GALLBLADDER: Within normal limits.  SPLEEN: Within normal limits.  PANCREAS: Within normal limits.  ADRENALS: Within normal limits.  KIDNEYS/URETERS: Symmetric enhancement. No hydronephrosis.    BLADDER: Collapsed with Doe catheter.  REPRODUCTIVE ORGANS: Hysterectomy.    BOWEL: Status post partial colectomy with left lower quadrant colostomy   and Morfin stump. No bowel obstruction; oral contrast opacifies bowel to   the level of the distal colon. Clustered small bowel loops are again seen   in the pelvis. Rectal stump is unchanged in appearance.  PERITONEUM: Multiple peritoneal and retroperitoneal collections are   without interval change compared to the immediate prior CT 3/12/2024,   including a right retroperitoneal fluid collection, and left   extraperitoneal paracolic gutter collection, a thin gas-filled collection   along the right paracolic gutter, and a small gas-filled collection in   the anterior pelvis. A small collection adjacent to the posterior vaginal   cuff and at the rectal stump demonstrates a small locule of gas and is   contiguous with the posterior vaginal cuff and rectal stump (4-72),   concerning for developing rectovaginal fistula.  VESSELS: Within normal limits.  RETROPERITONEUM/LYMPH NODES: No lymphadenopathy.  ABDOMINAL WALL: Postsurgical changes. Slightly increased gas in the   anterior abdominal wall deep to the sutures, likely in continuity with   subjacent pelvic collection.  BONES: Within normal limits.    IMPRESSION:  Small collection in the pelvis is contiguous with the posterior vaginal   cuff and rectal stump as described above, concerning for a developing   rectovaginal fistula.    Postsurgical changes of Dayana's procedure and hysterectomy with   multiple peritoneal and retroperitoneal collections as described above   without significant interval change from the immediate prior CT 3/12/2024.    No bowel obstruction; oral contrast opacifies bowel to the level of the   distal colon. GI bleed cannot be excluded on a single phase CT in the   setting of positive oral contrast.    Other findings as above.    --- End of Report ---  < end of copied text >        
GYNECOLOGIC ONCOLOGY PROGRESS NOTE    SUBJECTIVE:  Reports persistent suprapubic, LLQ, and RUQ pain, however improved since drain placement over the weekend.  Tolerating regular diet without nausea or vomiting.  Denies fevers, chills, or any additional complaints.     OBJECTIVE:   ICU Vital Signs Last 24 Hrs  T(C): 36.5 (18 Mar 2024 05:03), Max: 37.1 (17 Mar 2024 09:34)  T(F): 97.7 (18 Mar 2024 05:03), Max: 98.8 (17 Mar 2024 09:34)  HR: 81 (18 Mar 2024 05:03) (76 - 92)  BP: 118/74 (18 Mar 2024 05:03) (114/73 - 124/81)  RR: 18 (18 Mar 2024 05:03) (18 - 18)  SpO2: 96% (18 Mar 2024 05:03) (94% - 97%)    O2 Parameters below as of 18 Mar 2024 05:03  Patient On (Oxygen Delivery Method): room air    I&O's Summary    15 Mar 2024 07:01  -  16 Mar 2024 07:00  --------------------------------------------------------  IN: 1100 mL / OUT: 2050 mL / NET: -950 mL    16 Mar 2024 07:01  -  17 Mar 2024 05:02  --------------------------------------------------------  IN: 3120 mL / OUT: 1435 mL / NET: 1685 mL      MEDICATIONS  (STANDING):  acetaminophen     Tablet .. 975 milliGRAM(s) Oral every 6 hours  atorvastatin 80 milliGRAM(s) Oral at bedtime  furosemide    Tablet 40 milliGRAM(s) Oral daily  heparin   Injectable 5000 Unit(s) SubCutaneous every 8 hours  lidocaine   4% Patch 1 Patch Transdermal every 24 hours  metoprolol succinate ER 50 milliGRAM(s) Oral daily  piperacillin/tazobactam IVPB.. 3.375 Gram(s) IV Intermittent every 8 hours  sodium chloride 0.9% with potassium chloride 20 mEq/L 1000 milliLiter(s) (100 mL/Hr) IV Continuous <Continuous>    MEDICATIONS  (PRN):  acetaminophen     Tablet .. 650 milliGRAM(s) Oral every 6 hours PRN Temp greater or equal to 38C (100.4F), Mild Pain (1 - 3), Moderate Pain (4 - 6), Severe Pain (7 - 10)    Physical Exam:  Gen: NAD, AAOx3  Resp: breathing comfortably on RA  Abdomen: soft, nondistended, significant suprapubic tenderness, continued right CVA tenderness, healed midline laparotomy scar, TANYA in place with minimal purulent output   GI: ostomy pink, +light brown stool output, no blood  Ext: no LE tenderness/swelling    LABS:                        8.8    9.73  )-----------( 397      ( 16 Mar 2024 05:41 )             28.0     03-15    133<L>  |  94<L>  |  6.8<L>  ----------------------------<  88  3.6   |  26.0  |  0.62    Ca    8.1<L>      15 Mar 2024 14:23  Phos  4.1     03-15  Mg     2.0     03-15    Urinalysis Basic - ( 15 Mar 2024 14:23 )    Color: x / Appearance: x / SG: x / pH: x  Gluc: 88 mg/dL / Ketone: x  / Bili: x / Urobili: x   Blood: x / Protein: x / Nitrite: x   Leuk Esterase: x / RBC: x / WBC x   Sq Epi: x / Non Sq Epi: x / Bacteria: x    Culture - Abscess with Gram Stain (03.15.24 @ 19:50)    Gram Stain:   Moderate polymorphonuclear leukocytes seen per low power field  Moderate Gram positive cocci in pairs seen per oil power field   Specimen Source: .Abscess left flank   Culture Results:   Few Streptococcus anginosus "Susceptibilities not performed"

## 2024-03-18 NOTE — DISCHARGE NOTE NURSING/CASE MANAGEMENT/SOCIAL WORK - PATIENT PORTAL LINK FT
You can access the FollowMyHealth Patient Portal offered by [facility] by registering at the following website: [email or web address]. By joining [employer]’s FollowMyHealth portal, you will also be able to view your health information using other applications (apps) compatible with our system.

## 2024-03-18 NOTE — PROGRESS NOTE ADULT - PROVIDER SPECIALTY LIST ADULT
Gyn Onc
Gyn Onc
Infectious Disease
Infectious Disease
Gyn Onc
Gyn Onc
Infectious Disease
Gyn Onc

## 2024-03-21 LAB
CULTURE RESULTS: ABNORMAL
CULTURE RESULTS: SIGNIFICANT CHANGE UP
CULTURE RESULTS: SIGNIFICANT CHANGE UP
SPECIMEN SOURCE: SIGNIFICANT CHANGE UP

## 2024-03-27 ENCOUNTER — APPOINTMENT (OUTPATIENT)
Dept: GYNECOLOGIC ONCOLOGY | Facility: CLINIC | Age: 59
End: 2024-03-27
Payer: COMMERCIAL

## 2024-03-27 ENCOUNTER — APPOINTMENT (OUTPATIENT)
Dept: UROLOGY | Facility: CLINIC | Age: 59
End: 2024-03-27

## 2024-03-27 VITALS
HEART RATE: 87 BPM | RESPIRATION RATE: 16 BRPM | WEIGHT: 163 LBS | DIASTOLIC BLOOD PRESSURE: 78 MMHG | HEIGHT: 63 IN | BODY MASS INDEX: 28.88 KG/M2 | OXYGEN SATURATION: 96 % | SYSTOLIC BLOOD PRESSURE: 124 MMHG

## 2024-03-27 DIAGNOSIS — N32.1 VESICOINTESTINAL FISTULA: ICD-10-CM

## 2024-03-27 PROCEDURE — 99024 POSTOP FOLLOW-UP VISIT: CPT

## 2024-03-27 NOTE — END OF VISIT
[FreeTextEntry3] : RTC as needed  Follow up with Infectious disease, will email IR for drain removal Follow up with Dr. Prieto to start chemo once cleared by infectious disease specialist [FreeTextEntry2] : Khushi Dill MA was present the entire duration of the patient interaction and gynecological exam.

## 2024-03-27 NOTE — PHYSICAL EXAM
[Chaperone Present] : A chaperone was present in the examining room during all aspects of the physical examination [Normal] : Mood and affect: Normal [FreeTextEntry1] : + smiling in good mood [de-identified] : + ostomy with brown soft stool, no blood, midline incision with small periumbilical scap, well healed incision, left lower quadrant drain with white drainage fluid in tubing, empty bulb [Restricted in physically strenuous activity but ambulatory and able to carry out work of a light or sedentary nature] : Status 1- Restricted in physically strenuous activity but ambulatory and able to carry out work of a light or sedentary nature, e.g., light house work, office work

## 2024-03-27 NOTE — ASSESSMENT
[FreeTextEntry1] : This 57 y/o with Stage IV metastatic cancer favor serous is s/p Ex-lap AZALIA, recto-sigmoid resection en-clock, radical pelvic dissection with bilateral ureterolysis, Morfin's pouch, end colostomy, diaphragmatic stripping. Splenic flexure mobilization, infra gastric omentectomy, resection of falsiform ligament, appendectomy, cystotomy repair, cytoreductive surgery HIPEC on 2/6/24.  Patient was hospitalized last week due to found pelvic infection s/p drain placement. Resolution of bladder fistula. Has fllow up with infectious disease. They will recommend if further imaging is needed. Given good appetite and vaginal/rectal drainage likely the infection is well controlled and no further intervention is needed. Will likely need repeat imaging prior to re-starting chemotherapy with Dr. Prieto. Recommenda nother 3 cycles of carbo/taxol.

## 2024-03-27 NOTE — HISTORY OF PRESENT ILLNESS
[FreeTextEntry1] : This 57 y/o with Stage IV metastatic cancer favor serous is s/p Ex-lap AZALIA, recto-sigmoid resection en-clock, radical pelvic dissection with bilateral ureterolysis, Morfin's pouch, end colostomy, diaphragmatic stripping. Splenic flexure mobilization, infra gastric omentectomy, resection of falsiform ligament, appendectomy, cystotomy repair, cytoreductive surgery HIPEC on 2/6/24. Patient was admitted to hospital for FTT. Plan is for patient to complete chemotherapy with Dr. Prieto. Patient returns to the office today for a hospital follow up.   She still has left lower quadrant drain in place putting out 5-10 cc per day of purulent discharge. She has one more dose of antibiotics and follow up with Infectious disease tomorrow. She presents for follow up. She reports appetiate for 10 people she reports always being hungry. She is feeling much better and smiling today. She reports minimal vagina/rectal discharge. Normal function of the ostomy.

## 2024-03-27 NOTE — REASON FOR VISIT
[FreeTextEntry1] : [facility] [place]. [phone] [address] [place] [address]

## 2024-03-28 ENCOUNTER — APPOINTMENT (OUTPATIENT)
Dept: INTERNAL MEDICINE | Facility: CLINIC | Age: 59
End: 2024-03-28
Payer: COMMERCIAL

## 2024-03-28 VITALS
HEIGHT: 63 IN | SYSTOLIC BLOOD PRESSURE: 105 MMHG | BODY MASS INDEX: 28.88 KG/M2 | DIASTOLIC BLOOD PRESSURE: 55 MMHG | WEIGHT: 163 LBS

## 2024-03-28 DIAGNOSIS — N73.9 FEMALE PELVIC INFLAMMATORY DISEASE, UNSPECIFIED: ICD-10-CM

## 2024-03-28 PROCEDURE — 99215 OFFICE O/P EST HI 40 MIN: CPT

## 2024-03-28 NOTE — ASSESSMENT
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

## 2024-03-28 NOTE — REASON FOR VISIT
[Follow-Up: _____] : a [unfilled] follow-up visit [Family Member] : family member [FreeTextEntry1] : HOSPITAL FOLLOWUP  PELVIC ABSCES

## 2024-03-28 NOTE — HISTORY OF PRESENT ILLNESS
[FreeTextEntry1] : 57YO FEMALE S/P EXP LAP AZALIA RECTOSIGMOID RESECTION ENN BLOCK PELVIC DISSECTION HARTMAND POUCH SYTOTOMY REPAIR THAT DEVELOPED BLADDER FISTULA ADN SUBSEQUENT CYSTOTOMY TO THE RECTAL STUMP ADMTTTED THE   LEFT FLANK COLELCTION UNDERRWENT IR GUIDE DDRAINAGE ON 3/13  CX POSTIVE STREP ANGIONISIS AND INTERMEDIUS WS DISCARGED ON AUGMENITN AND NOW HERE FOR FOLLUWP  STILL HAS A DRAIN IN THE PLACE  NO FEVERS OR CHILLS

## 2024-03-28 NOTE — PHYSICAL EXAM
[General Appearance - Alert] : alert [General Appearance - In No Acute Distress] : in no acute distress [Sclera] : the sclera and conjunctiva were normal [PERRL With Normal Accommodation] : pupils were equal in size, round, reactive to light [Extraocular Movements] : extraocular movements were intact [Outer Ear] : the ears and nose were normal in appearance [Oropharynx] : the oropharynx was normal with no thrush [Neck Appearance] : the appearance of the neck was normal [Neck Cervical Mass (___cm)] : no neck mass was observed [Jugular Venous Distention Increased] : there was no jugular-venous distention [Thyroid Diffuse Enlargement] : the thyroid was not enlarged [] : no respiratory distress [Auscultation Breath Sounds / Voice Sounds] : lungs were clear to auscultation bilaterally [Heart Rate And Rhythm] : heart rate was normal and rhythm regular [Heart Sounds] : normal S1 and S2 [Heart Sounds Gallop] : no gallops [Murmurs] : no murmurs [Heart Sounds Pericardial Friction Rub] : no pericardial rub [No Palpable Adenopathy] : no palpable adenopathy [FreeTextEntry1] : LEFT DRAIN IN PLACE [Musculoskeletal - Swelling] : no joint swelling [Nail Clubbing] : no clubbing  or cyanosis of the fingernails [Motor Tone] : muscle strength and tone were normal [Deep Tendon Reflexes (DTR)] : deep tendon reflexes were 2+ and symmetric [Sensation] : the sensory exam was normal to light touch and pinprick [No Focal Deficits] : no focal deficits

## 2024-04-01 ENCOUNTER — LABORATORY RESULT (OUTPATIENT)
Age: 59
End: 2024-04-01

## 2024-04-01 ENCOUNTER — APPOINTMENT (OUTPATIENT)
Dept: HEMATOLOGY ONCOLOGY | Facility: CLINIC | Age: 59
End: 2024-04-01
Payer: MEDICAID

## 2024-04-01 VITALS
WEIGHT: 167 LBS | HEART RATE: 84 BPM | DIASTOLIC BLOOD PRESSURE: 70 MMHG | OXYGEN SATURATION: 97 % | HEIGHT: 63 IN | BODY MASS INDEX: 29.59 KG/M2 | TEMPERATURE: 98.2 F | SYSTOLIC BLOOD PRESSURE: 106 MMHG

## 2024-04-01 LAB
HCT VFR BLD CALC: 29.5 %
HGB BLD-MCNC: 9.3 G/DL
MCHC RBC-ENTMCNC: 29.2 PG
MCHC RBC-ENTMCNC: 31.5 GM/DL
MCV RBC AUTO: 92.8 FL
PLATELET # BLD AUTO: 397 K/UL
RBC # BLD: 3.18 M/UL
RBC # FLD: 16.6 %
WBC # FLD AUTO: 9.4 K/UL

## 2024-04-01 PROCEDURE — 99214 OFFICE O/P EST MOD 30 MIN: CPT

## 2024-04-01 PROCEDURE — 85027 COMPLETE CBC AUTOMATED: CPT

## 2024-04-01 PROCEDURE — G2211 COMPLEX E/M VISIT ADD ON: CPT

## 2024-04-01 RX ORDER — ASPIRIN 81 MG/1
81 TABLET ORAL
Refills: 0 | Status: DISCONTINUED | COMMUNITY
End: 2024-04-01

## 2024-04-01 NOTE — PHYSICAL EXAM
[Restricted in physically strenuous activity but ambulatory and able to carry out work of a light or sedentary nature] : Status 1- Restricted in physically strenuous activity but ambulatory and able to carry out work of a light or sedentary nature, e.g., light house work, office work [Normal] : affect appropriate [de-identified] : generally well appearing female, NAD, pleasant  [de-identified] : vertical incision, well healed, ostomy in place, drain in place w/ minimal fluid drainage

## 2024-04-01 NOTE — RESULTS/DATA
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   We discussed that management of stage IV ovarian cancer is palliative in nature and goals will be to prolong her life and improve her symptoms.  She will likely require systemic therapy prior to any debulking surgery.  We referred her to Dr. Miroslava Perez at [facility] from gynecologic oncology.  Will recommend carbo/taxo/bevacizumab for a total of 6 cycles at this time, followed by maintenance therapy likely with niraparib.   Recommend treatment w/ carboplatin AUC 5-6 IV q 21 days with taxol 175mg/m2 IV and avastin (bevacizumab) every 21 days for 6 cycles for treatment of stage IV ovarian cancer.  C1 chemotherapy w/ carbo/taxol initiated on 11/2/23. Taxol reaction at last visit with C2- Switched to carbo/abraxane/blas. She is now s/p cytoreductive surgery w/ HIPEC on 2/6/24 with Dr. Miroslava Perez which revealed high grade serous ovarian cancer, pT3Nx.  Post-op course c/b fistula and drain infection, remains on augmentin but is feeling much better. Pending CT tomorrow and possible drain removal. Tentatively scheduled to resume chemotherapy w/ carbo/abraxane (HOLD blas) on 4/5/24.  Plan for 2-3 additional cycles of chemotherapy followed by niraparib maintenance.   Foundation testing 10/6/23- TPS 0%, TC 0%, PDL1 0 IZAIAH score 24.5%, HRD Positive- likely benefit from maintenance niraparib  MS-Stable MTAP loss, NF1 mut, TP53 mut, CDKN2A/B loss Myriad germline testing 10/16/23- negative for pathogenic mutations or VUS   All patient questions answered at today's visit. Patient urged to call the office with any questions or concerns.   I personally have spent a total of 35 minutes of time on the date of this encounter reviewing test results, documenting findings, coordinating care and directly consulting with the patient and/or designated family member. Greater than 50% of the face to face encounter time was spent on counseling and/or coordination of care for high grade serous ovarian cancer.

## 2024-04-01 NOTE — HISTORY OF PRESENT ILLNESS
[de-identified] : Referred by: hospital follow up  Gosia (daughter) [phone], Rosalina (lives in [place])  Her daughter translates per patient preference   Cancer Summary:  DIAGNOSIS: peritoneal carcinomatosis  PROCEDURE AND DATE: Abdominal lesion core biopsy 10/6/23 PATHOLOGY: metastatic carcinoma, the IHC profile is compatible w/ mullerian primary  STAGE: IV Chemotherapy:  C1 Carbo/taxol 11/2/23 C2 Carbo/taxol/blas 11/24/23 (reacted to Taxol) C3 carbo/abraxane/blas 12/14/23 C4 carbo/abraxane/blas 1/4/24 Radiation: N/A Hormonal: N/A STATUS: active, on chemotherapy  Genetics STATUS:  Foundation testing 10/6/23- TPS 0%, TC 0%, PDL1 0 IZAIAH score 24.5%, HRD Positive- likely benefit from maintenance niraparib  MS-Stable MTAP loss, NF1 mut, TP53 mut, CDKN2A/B loss  Ms. Mckeon presented at age 58 in October 2023 for evaluation of advanced ovarian cancer, peritoneal carcinomatosis. The patient has a medical history of HTN, HLD.    Antonella is here today with her 2 daughters (one of whom is in from [place]). She initially presented to [facility] in July 2023 with SOB, difficulty breathing- found to have a pleural effusion which was attributed to cardiac etiology, s/p thoracentesis. She generaly does not follow with health care providers and was not up to date on any health care maintenance. She was then readmitted to [facility] from 10/1/23 to 10/8/23 for SOB/JACKSON, workup revealed peritoneal carcinomatosis with ascites as well as right sided pleural effusion. She was evaluated by Dr. Karolina Rodriguez and underwent repeat imaging w/ abdomen pelvis CAP w/ contrast on 10/3/23- revealed diffuse peritoneal caricnomatosis, no cystic ovarian lesion, loculated left pelvic sidewall fluid collection/cystic lesion measuring 2.3cm x 1.4cm x 6cm. 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 She presents today to discuss options for systemic therapy.   At today's visit she reports her breathing has improved, remains on lasix. She denies fevers, chills, CP, nausea, vomiting, diarrhea. She does report a poor appetite and early satiety. She has lost 7lb since discharge from the hospital. Her LMP was 4 years ago. Denies any recent vaginal bleeding. She has not seen a gynecologist in many years. She has had 2 prior pregnancies and 2 live births, age at first birth was 23.    Imaging: CT abdomen/pelvis with contrast 10/1/2023-large right pleural effusion with associated right lower lobe collapse, small to moderate abdominal and pelvic ascites without definite omental caking, small cystic left adnexal lesion CT chest w/o contrast 10/7/23-interval right thoracentesis, overall decreased volume and new air component of small right hydropneumothorax, small lung nodules, some new Pelvic ultrasound 10/3/2023-Free pelvic fluid, 5 mm left ovarian cyst  Path:  Abdominal lesion core biopsy 10/6/23- metastatic carcinoma, the IHC profile is compatible w/ mullerian primary    Genetics: sent 10/16/23    HCM: - Colonoscopy: never done  - Gyn: never done  - Mammo: never done  - Lung cancer screen: never done (but had recent chest imaging)  - DEXA: never done    SH: - Occupation: works part time as a   - Living situation: lives in [place] w/ her , 2 children  - Smoking/etoh/illicits: former smoker, quit 2 months ago, denies etoh  - Exercise: not very physically active at this time   FH: - Her father had prostate cancer  [de-identified] : Antonella presents today for follow up on 4/1/24 for peritoneal carcinomatosis.   She completed 4 cycles of chemotherapy and subsequently underwent cytoreductive surgery w/ HIPEC on 2/6/24 with Dr. Miroslava Perez which revealed high grade serous ovarian cancer, pT3Nx. She had a very difficult post-operative course with recurrent hospitalization for bladder fistula, drain infection. She remains on antibiotics with augmentin at this time but denies any recent fevers.  She is now doing much better and returns to discuss resuming chemotherapy for additional 2-3 cycles.  Seen by ID on 3/28/24- plan for CT tomorrow with possible drain removal.  Her appetite is good, slowly gaining weight. Denies recent headaches, visual changes, balance issues, CP, cough, SOB, n/v/d, constipation, unintentional weight loss or new bone or back pain.  S/p Ex-lap AZALIA, recto-sigmoid resection en-clock, radical pelvic dissection with bilateral ureterolysis, Morfin's pouch, end colostomy, diaphragmatic stripping. Splenic flexure mobilization, infra gastric omentectomy, resection of falsiform ligament, appendectomy, cystotomy repair, cytoreductive surgery HIPEC- 2/6/24-  Bilateral ovaries with high grade mullerian carcinoma and tubo-ovarian adhesions Appendix with metastatic carcinoma,  Uterine serosa with adhesion to bowel and metastatic carcinoma One identified fallopian tube with metastatic carcinoma Favored to be high grade serous carcinoma.  ypT3Nx  Laboratory studies reviewed at today's visit and notable for: WBC 9.40, Hb 9.3, plt 397   Foundation testing 10/6/23- TPS 0%, TC 0%, PDL1 0 IZAIAH score 24.5%, HRD Positive- likely benefit from maintenance niraparib  MS-Stable MTAP loss, NF1 mut, TP53 mut, CDKN2A/B loss

## 2024-04-02 ENCOUNTER — OUTPATIENT (OUTPATIENT)
Dept: OUTPATIENT SERVICES | Facility: HOSPITAL | Age: 59
LOS: 1 days | End: 2024-04-02
Payer: SELF-PAY

## 2024-04-02 DIAGNOSIS — Z98.890 OTHER SPECIFIED POSTPROCEDURAL STATES: Chronic | ICD-10-CM

## 2024-04-02 DIAGNOSIS — C56.9 MALIGNANT NEOPLASM OF UNSPECIFIED OVARY: ICD-10-CM

## 2024-04-02 LAB
ALBUMIN SERPL ELPH-MCNC: 3.6 G/DL
ALP BLD-CCNC: 119 U/L
ALT SERPL-CCNC: 17 U/L
ANION GAP SERPL CALC-SCNC: 13 MMOL/L
AST SERPL-CCNC: 17 U/L
BILIRUB SERPL-MCNC: 0.2 MG/DL
BUN SERPL-MCNC: 15 MG/DL
CALCIUM SERPL-MCNC: 9.4 MG/DL
CHLORIDE SERPL-SCNC: 101 MMOL/L
CO2 SERPL-SCNC: 24 MMOL/L
CREAT SERPL-MCNC: 0.85 MG/DL
EGFR: 80 ML/MIN/1.73M2
FERRITIN SERPL-MCNC: 686 NG/ML
FOLATE SERPL-MCNC: 4.2 NG/ML
GLUCOSE SERPL-MCNC: 89 MG/DL
IRON SATN MFR SERPL: 19 %
IRON SERPL-MCNC: 52 UG/DL
MAGNESIUM SERPL-MCNC: 1.9 MG/DL
PHOSPHATE SERPL-MCNC: 4.5 MG/DL
POTASSIUM SERPL-SCNC: 4.4 MMOL/L
PROT SERPL-MCNC: 7.4 G/DL
SODIUM SERPL-SCNC: 139 MMOL/L
TIBC SERPL-MCNC: 277 UG/DL
UIBC SERPL-MCNC: 225 UG/DL
VIT B12 SERPL-MCNC: 622 PG/ML

## 2024-04-02 PROCEDURE — 76080 X-RAY EXAM OF FISTULA: CPT | Mod: 26

## 2024-04-02 PROCEDURE — 76080 X-RAY EXAM OF FISTULA: CPT

## 2024-04-02 PROCEDURE — 49424 ASSESS CYST CONTRAST INJECT: CPT

## 2024-04-02 NOTE — PROCEDURE NOTE - PROCEDURE FINDINGS AND DETAILS
The patient was presented to IR dept for Left flank abscess drain study. Patient reports 1-2cc output daily.   Existing drainage bag was removed,  image was obtained, and existing drain was injected with contrast, which showed collection around pigtail has resolved, but there is a communication to a collection in the pelvis. recommeded to patient to reposition catheter, but would like to come back when she can get sedation.

## 2024-04-03 ENCOUNTER — OUTPATIENT (OUTPATIENT)
Dept: OUTPATIENT SERVICES | Facility: HOSPITAL | Age: 59
LOS: 1 days | Discharge: HOME | End: 2024-04-03
Payer: SELF-PAY

## 2024-04-03 ENCOUNTER — TRANSCRIPTION ENCOUNTER (OUTPATIENT)
Age: 59
End: 2024-04-03

## 2024-04-03 VITALS
RESPIRATION RATE: 16 BRPM | SYSTOLIC BLOOD PRESSURE: 116 MMHG | DIASTOLIC BLOOD PRESSURE: 59 MMHG | TEMPERATURE: 98 F | HEIGHT: 63.39 IN | WEIGHT: 166.89 LBS | OXYGEN SATURATION: 98 % | HEART RATE: 77 BPM

## 2024-04-03 DIAGNOSIS — Z98.890 OTHER SPECIFIED POSTPROCEDURAL STATES: Chronic | ICD-10-CM

## 2024-04-03 DIAGNOSIS — C56.9 MALIGNANT NEOPLASM OF UNSPECIFIED OVARY: ICD-10-CM

## 2024-04-03 PROCEDURE — 49423 EXCHANGE DRAINAGE CATHETER: CPT

## 2024-04-03 PROCEDURE — 75984 XRAY CONTROL CATHETER CHANGE: CPT

## 2024-04-03 PROCEDURE — C1729: CPT

## 2024-04-03 PROCEDURE — C1769: CPT

## 2024-04-03 PROCEDURE — 49424 ASSESS CYST CONTRAST INJECT: CPT

## 2024-04-03 PROCEDURE — 75984 XRAY CONTROL CATHETER CHANGE: CPT | Mod: 26

## 2024-04-03 RX ORDER — VALSARTAN 80 MG/1
1 TABLET ORAL
Refills: 0 | DISCHARGE

## 2024-04-03 NOTE — ASU DISCHARGE PLAN (ADULT/PEDIATRIC) - ASU DC SPECIAL INSTRUCTIONSFT
[facility] Patient to follow up once drain output is less than 10cc per day. continuing current drain management.

## 2024-04-03 NOTE — PROCEDURE NOTE - PROCEDURE FINDINGS AND DETAILS
Existing drain was injected with contrast which showed resolution of collection around pigtail but there was a communication to adjacent collection.  under fluoroscopy guidance, the old drain was cut and a wire was advanced into the collection. A new 10 Fr drain was exchanged over wire and into collection. Drain secured to skin w sutures. Connected to TANYA bulb.    Please call Interventional Radiology with any questions, concerns, or issues.

## 2024-04-03 NOTE — DISCHARGE NOTE NURSING/CASE MANAGEMENT/SOCIAL WORK - NSDCPEFALRISK_GEN_ALL_CORE
For information on Fall & Injury Prevention, visit: [email or web address] OR  [email or web address] OR  https://www.cdc.gov/steadi/patient.html

## 2024-04-03 NOTE — ASU DISCHARGE PLAN (ADULT/PEDIATRIC) - CARE PROVIDER_API CALL
Miroslava Perez  Gynecologic Oncology  [address] [place] [id number]  Phone: [phone]  Fax: [phone]  Follow Up Time:     Berny Ribeiro  Interventional Radiology and Diagnostic Radiology  [address] [place] [id number]  Phone: [phone]  Fax: [phone]  Follow Up Time:

## 2024-04-03 NOTE — DISCHARGE NOTE NURSING/CASE MANAGEMENT/SOCIAL WORK - PATIENT PORTAL LINK FT
You can access the FollowMyHealth Patient Portal offered by [facility] by registering at the following website: [email or web address]. By joining [employer]’s FollowMyHealth portal, you will also be able to view your health information using other applications (apps) compatible with our system.

## 2024-04-04 ENCOUNTER — APPOINTMENT (OUTPATIENT)
Dept: HEMATOLOGY ONCOLOGY | Facility: CLINIC | Age: 59
End: 2024-04-04

## 2024-04-04 DIAGNOSIS — D52.0 DIETARY FOLATE DEFICIENCY ANEMIA: ICD-10-CM

## 2024-04-04 RX ORDER — FOLIC ACID 1 MG/1
1 TABLET ORAL
Qty: 90 | Refills: 1 | Status: ACTIVE | COMMUNITY
Start: 2024-04-04 | End: 1900-01-01

## 2024-04-05 ENCOUNTER — APPOINTMENT (OUTPATIENT)
Dept: INFUSION THERAPY | Facility: CANCER CENTER | Age: 59
End: 2024-04-05

## 2024-04-16 ENCOUNTER — OUTPATIENT (OUTPATIENT)
Dept: OUTPATIENT SERVICES | Facility: HOSPITAL | Age: 59
LOS: 1 days | End: 2024-04-16
Payer: SELF-PAY

## 2024-04-16 DIAGNOSIS — Z98.890 OTHER SPECIFIED POSTPROCEDURAL STATES: Chronic | ICD-10-CM

## 2024-04-16 DIAGNOSIS — N73.9 FEMALE PELVIC INFLAMMATORY DISEASE, UNSPECIFIED: ICD-10-CM

## 2024-04-16 PROCEDURE — 76080 X-RAY EXAM OF FISTULA: CPT

## 2024-04-16 PROCEDURE — 49424 ASSESS CYST CONTRAST INJECT: CPT

## 2024-04-16 PROCEDURE — 76080 X-RAY EXAM OF FISTULA: CPT | Mod: 26

## 2024-04-16 NOTE — PROCEDURE NOTE - PROCEDURE FINDINGS AND DETAILS
Patient and daughter reports zero output to xochitl bulbs since tube exchange on 4/3. The patient was brought to the fluoroscopy suite. Under fluoroscopy, the drain was injected with contrast which showed resolution of previously seen collection, some residual fluid noted. The catheter was not occluded.   Given these findings, the drain was removed. Dressing placed over insertion site.

## 2024-04-17 ENCOUNTER — OUTPATIENT (OUTPATIENT)
Dept: OUTPATIENT SERVICES | Facility: HOSPITAL | Age: 59
LOS: 1 days | End: 2024-04-17
Payer: MEDICAID

## 2024-04-17 DIAGNOSIS — Z98.890 OTHER SPECIFIED POSTPROCEDURAL STATES: Chronic | ICD-10-CM

## 2024-04-17 DIAGNOSIS — Z51.11 ENCOUNTER FOR ANTINEOPLASTIC CHEMOTHERAPY: ICD-10-CM

## 2024-04-17 DIAGNOSIS — R11.2 NAUSEA WITH VOMITING, UNSPECIFIED: ICD-10-CM

## 2024-04-17 DIAGNOSIS — C56.9 MALIGNANT NEOPLASM OF UNSPECIFIED OVARY: ICD-10-CM

## 2024-04-17 NOTE — HISTORY OF PRESENT ILLNESS
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 Mckeon presented at age 58 in October 2023 for evaluation of advanced ovarian cancer, peritoneal carcinomatosis. The patient has a medical history of HTN, HLD.    Antonella is here today with her 2 daughters (one of whom is in from [place]). She initially presented to [facility] in July 2023 with SOB, difficulty breathing- found to have a pleural effusion which was attributed to cardiac etiology, s/p thoracentesis. She generaly does not follow with health care providers and was not up to date on any health care maintenance. She was then readmitted to [facility] from 10/1/23 to 10/8/23 for SOB/JACKSON, workup revealed peritoneal carcinomatosis with ascites as well as right sided pleural effusion. She was evaluated by Dr. Karolina Rodriguez and underwent repeat imaging w/ abdomen pelvis CAP w/ contrast on 10/3/23- revealed diffuse peritoneal caricnomatosis, no cystic ovarian lesion, loculated left pelvic sidewall fluid collection/cystic lesion measuring 2.3cm x 1.4cm x 6cm. 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 She presents today to discuss options for systemic therapy.   At today's visit she reports her breathing has improved, remains on lasix. She denies fevers, chills, CP, nausea, vomiting, diarrhea. She does report a poor appetite and early satiety. She has lost 7lb since discharge from the hospital. Her LMP was 4 years ago. Denies any recent vaginal bleeding. She has not seen a gynecologist in many years. She has had 2 prior pregnancies and 2 live births, age at first birth was 23.    Imaging: CT abdomen/pelvis with contrast 10/1/2023-large right pleural effusion with associated right lower lobe collapse, small to moderate abdominal and pelvic ascites without definite omental caking, small cystic left adnexal lesion CT chest w/o contrast 10/7/23-interval right thoracentesis, overall decreased volume and new air component of small right hydropneumothorax, small lung nodules, some new Pelvic ultrasound 10/3/2023-Free pelvic fluid, 5 mm left ovarian cyst  Path:  Abdominal lesion core biopsy 10/6/23- metastatic carcinoma, the IHC profile is compatible w/ mullerian primary    Genetics: sent 10/16/23    HCM: - Colonoscopy: never done  - Gyn: never done  - Mammo: never done  - Lung cancer screen: never done (but had recent chest imaging)  - DEXA: never done    SH: - Occupation: works part time as a   - Living situation: lives in [place] w/ her , 2 children  - Smoking/etoh/illicits: former smoker, quit 2 months ago, denies etoh  - Exercise: not very physically active at this time   FH: - Her father had prostate cancer  [de-identified] : Antonella presents today for follow up on 4/18/24 for peritoneal carcinomatosis.   S/p drain exchange 4/11/24.   She completed 4 cycles of chemotherapy and subsequently underwent cytoreductive surgery w/ HIPEC on 2/6/24 with Dr. Miroslava Perez which revealed high grade serous ovarian cancer, pT3Nx. She had a very difficult post-operative course with recurrent hospitalization for bladder fistula, drain infection. She remains on antibiotics with augmentin at this time but denies any recent fevers.  She is now doing much better and returns to discuss resuming chemotherapy for additional 2-3 cycles.  Seen by ID on 3/28/24- plan for CT tomorrow with possible drain removal.  Her appetite is good, slowly gaining weight. Denies recent headaches, visual changes, balance issues, CP, cough, SOB, n/v/d, constipation, unintentional weight loss or new bone or back pain.  S/p Ex-lap AZALIA, recto-sigmoid resection en-clock, radical pelvic dissection with bilateral ureterolysis, Morfin's pouch, end colostomy, diaphragmatic stripping. Splenic flexure mobilization, infra gastric omentectomy, resection of falsiform ligament, appendectomy, cystotomy repair, cytoreductive surgery HIPEC- 2/6/24-  Bilateral ovaries with high grade mullerian carcinoma and tubo-ovarian adhesions Appendix with metastatic carcinoma,  Uterine serosa with adhesion to bowel and metastatic carcinoma One identified fallopian tube with metastatic carcinoma Favored to be high grade serous carcinoma.  ypT3Nx  Laboratory studies reviewed at today's visit and notable for: WBC 9.40, Hb 9.3, plt 397   Foundation testing 10/6/23- TPS 0%, TC 0%, PDL1 0 IZAIAH score 24.5%, HRD Positive- likely benefit from maintenance niraparib  MS-Stable MTAP loss, NF1 mut, TP53 mut, CDKN2A/B loss

## 2024-04-18 ENCOUNTER — LABORATORY RESULT (OUTPATIENT)
Age: 59
End: 2024-04-18

## 2024-04-18 ENCOUNTER — APPOINTMENT (OUTPATIENT)
Dept: HEMATOLOGY ONCOLOGY | Facility: CLINIC | Age: 59
End: 2024-04-18
Payer: COMMERCIAL

## 2024-04-18 ENCOUNTER — RESULT REVIEW (OUTPATIENT)
Age: 59
End: 2024-04-18

## 2024-04-18 VITALS
OXYGEN SATURATION: 98 % | BODY MASS INDEX: 30.12 KG/M2 | SYSTOLIC BLOOD PRESSURE: 128 MMHG | WEIGHT: 170 LBS | HEIGHT: 63 IN | TEMPERATURE: 98 F | HEART RATE: 81 BPM | DIASTOLIC BLOOD PRESSURE: 75 MMHG

## 2024-04-18 LAB
BASOPHILS # BLD AUTO: 0.08 K/UL — SIGNIFICANT CHANGE UP (ref 0–0.2)
BASOPHILS NFR BLD AUTO: 0.9 % — SIGNIFICANT CHANGE UP (ref 0–2)
EOSINOPHIL # BLD AUTO: 0.14 K/UL — SIGNIFICANT CHANGE UP (ref 0–0.5)
EOSINOPHIL NFR BLD AUTO: 1.6 % — SIGNIFICANT CHANGE UP (ref 0–6)
HCT VFR BLD CALC: 30.9 % — LOW (ref 34.5–45)
HGB BLD-MCNC: 9.7 G/DL — LOW (ref 11.5–15.5)
IMM GRANULOCYTES NFR BLD AUTO: 0.3 % — SIGNIFICANT CHANGE UP (ref 0–0.9)
LYMPHOCYTES # BLD AUTO: 3.16 K/UL — SIGNIFICANT CHANGE UP (ref 1–3.3)
LYMPHOCYTES # BLD AUTO: 36.1 % — SIGNIFICANT CHANGE UP (ref 13–44)
MCHC RBC-ENTMCNC: 29.4 PG — SIGNIFICANT CHANGE UP (ref 27–34)
MCHC RBC-ENTMCNC: 31.4 GM/DL — LOW (ref 32–36)
MCV RBC AUTO: 93.6 FL — SIGNIFICANT CHANGE UP (ref 80–100)
MONOCYTES # BLD AUTO: 0.7 K/UL — SIGNIFICANT CHANGE UP (ref 0–0.9)
MONOCYTES NFR BLD AUTO: 8 % — SIGNIFICANT CHANGE UP (ref 2–14)
NEUTROPHILS # BLD AUTO: 4.64 K/UL — SIGNIFICANT CHANGE UP (ref 1.8–7.4)
NEUTROPHILS NFR BLD AUTO: 53.1 % — SIGNIFICANT CHANGE UP (ref 43–77)
NRBC # BLD: 0 /100 WBCS — SIGNIFICANT CHANGE UP (ref 0–0)
PLATELET # BLD AUTO: 327 K/UL — SIGNIFICANT CHANGE UP (ref 150–400)
RBC # BLD: 3.3 M/UL — LOW (ref 3.8–5.2)
RBC # FLD: 15.6 % — HIGH (ref 10.3–14.5)
WBC # BLD: 8.75 K/UL — SIGNIFICANT CHANGE UP (ref 3.8–10.5)
WBC # FLD AUTO: 8.75 K/UL — SIGNIFICANT CHANGE UP (ref 3.8–10.5)

## 2024-04-18 PROCEDURE — G2211 COMPLEX E/M VISIT ADD ON: CPT

## 2024-04-18 PROCEDURE — 99214 OFFICE O/P EST MOD 30 MIN: CPT

## 2024-04-18 RX ORDER — PRAMOXINE HYDROCHLORIDE HYDROCORTISONE ACETATE 100; 100 MG/10G; MG/10G
1-1 AEROSOL, FOAM TOPICAL
Qty: 1 | Refills: 1 | Status: DISCONTINUED | COMMUNITY
Start: 2024-01-04 | End: 2024-04-18

## 2024-04-18 RX ORDER — AMOXICILLIN AND CLAVULANATE POTASSIUM 875; 125 MG/1; MG/1
875-125 TABLET, COATED ORAL
Qty: 56 | Refills: 1 | Status: DISCONTINUED | COMMUNITY
Start: 2024-03-28 | End: 2024-04-18

## 2024-04-19 ENCOUNTER — RESULT REVIEW (OUTPATIENT)
Age: 59
End: 2024-04-19

## 2024-04-19 ENCOUNTER — APPOINTMENT (OUTPATIENT)
Dept: INFUSION THERAPY | Facility: CANCER CENTER | Age: 59
End: 2024-04-19

## 2024-04-19 LAB
ALBUMIN SERPL ELPH-MCNC: 4 G/DL — SIGNIFICANT CHANGE UP (ref 3.3–5)
ALP SERPL-CCNC: 111 U/L — SIGNIFICANT CHANGE UP (ref 40–120)
ALT FLD-CCNC: 8 U/L — LOW (ref 10–45)
ANION GAP SERPL CALC-SCNC: 13 MMOL/L — SIGNIFICANT CHANGE UP (ref 5–17)
AST SERPL-CCNC: 16 U/L — SIGNIFICANT CHANGE UP (ref 10–40)
BASOPHILS # BLD AUTO: 0.08 K/UL — SIGNIFICANT CHANGE UP (ref 0–0.2)
BASOPHILS NFR BLD AUTO: 0.9 % — SIGNIFICANT CHANGE UP (ref 0–2)
BILIRUB SERPL-MCNC: 0.3 MG/DL — SIGNIFICANT CHANGE UP (ref 0.2–1.2)
BUN SERPL-MCNC: 17 MG/DL — SIGNIFICANT CHANGE UP (ref 7–23)
CALCIUM SERPL-MCNC: 9.3 MG/DL — SIGNIFICANT CHANGE UP (ref 8.4–10.5)
CANCER AG125 SERPL-ACNC: 28 U/ML — SIGNIFICANT CHANGE UP
CHLORIDE SERPL-SCNC: 101 MMOL/L — SIGNIFICANT CHANGE UP (ref 96–108)
CO2 SERPL-SCNC: 24 MMOL/L — SIGNIFICANT CHANGE UP (ref 22–31)
CREAT SERPL-MCNC: 0.8 MG/DL — SIGNIFICANT CHANGE UP (ref 0.5–1.3)
EGFR: 86 ML/MIN/1.73M2 — SIGNIFICANT CHANGE UP
EOSINOPHIL # BLD AUTO: 0.12 K/UL — SIGNIFICANT CHANGE UP (ref 0–0.5)
EOSINOPHIL NFR BLD AUTO: 1.4 % — SIGNIFICANT CHANGE UP (ref 0–6)
GLUCOSE SERPL-MCNC: 72 MG/DL — SIGNIFICANT CHANGE UP (ref 70–99)
HCT VFR BLD CALC: 29.2 % — LOW (ref 34.5–45)
HGB BLD-MCNC: 9.5 G/DL — LOW (ref 11.5–15.5)
IMM GRANULOCYTES NFR BLD AUTO: 0.4 % — SIGNIFICANT CHANGE UP (ref 0–0.9)
LYMPHOCYTES # BLD AUTO: 3.22 K/UL — SIGNIFICANT CHANGE UP (ref 1–3.3)
LYMPHOCYTES # BLD AUTO: 37.9 % — SIGNIFICANT CHANGE UP (ref 13–44)
MAGNESIUM SERPL-MCNC: 2.1 MG/DL — SIGNIFICANT CHANGE UP (ref 1.6–2.6)
MCHC RBC-ENTMCNC: 29.7 PG — SIGNIFICANT CHANGE UP (ref 27–34)
MCHC RBC-ENTMCNC: 32.5 GM/DL — SIGNIFICANT CHANGE UP (ref 32–36)
MCV RBC AUTO: 91.3 FL — SIGNIFICANT CHANGE UP (ref 80–100)
MONOCYTES # BLD AUTO: 0.68 K/UL — SIGNIFICANT CHANGE UP (ref 0–0.9)
MONOCYTES NFR BLD AUTO: 8 % — SIGNIFICANT CHANGE UP (ref 2–14)
NEUTROPHILS # BLD AUTO: 4.37 K/UL — SIGNIFICANT CHANGE UP (ref 1.8–7.4)
NEUTROPHILS NFR BLD AUTO: 51.4 % — SIGNIFICANT CHANGE UP (ref 43–77)
NRBC # BLD: 0 /100 WBCS — SIGNIFICANT CHANGE UP (ref 0–0)
PHOSPHATE SERPL-MCNC: 4.3 MG/DL — SIGNIFICANT CHANGE UP (ref 2.5–4.5)
PLATELET # BLD AUTO: 322 K/UL — SIGNIFICANT CHANGE UP (ref 150–400)
POTASSIUM SERPL-MCNC: 3.6 MMOL/L — SIGNIFICANT CHANGE UP (ref 3.5–5.3)
POTASSIUM SERPL-SCNC: 3.6 MMOL/L — SIGNIFICANT CHANGE UP (ref 3.5–5.3)
PROT SERPL-MCNC: 7.5 G/DL — SIGNIFICANT CHANGE UP (ref 6–8.3)
RBC # BLD: 3.2 M/UL — LOW (ref 3.8–5.2)
RBC # FLD: 15.2 % — HIGH (ref 10.3–14.5)
SODIUM SERPL-SCNC: 138 MMOL/L — SIGNIFICANT CHANGE UP (ref 135–145)
WBC # BLD: 8.5 K/UL — SIGNIFICANT CHANGE UP (ref 3.8–10.5)
WBC # FLD AUTO: 8.5 K/UL — SIGNIFICANT CHANGE UP (ref 3.8–10.5)

## 2024-05-10 ENCOUNTER — RESULT REVIEW (OUTPATIENT)
Age: 59
End: 2024-05-10

## 2024-05-10 ENCOUNTER — APPOINTMENT (OUTPATIENT)
Dept: HEMATOLOGY ONCOLOGY | Facility: CLINIC | Age: 59
End: 2024-05-10

## 2024-05-10 LAB
BASOPHILS # BLD AUTO: 0.08 K/UL — SIGNIFICANT CHANGE UP (ref 0–0.2)
BASOPHILS NFR BLD AUTO: 1.1 % — SIGNIFICANT CHANGE UP (ref 0–2)
EOSINOPHIL # BLD AUTO: 0.02 K/UL — SIGNIFICANT CHANGE UP (ref 0–0.5)
EOSINOPHIL NFR BLD AUTO: 0.3 % — SIGNIFICANT CHANGE UP (ref 0–6)
HCT VFR BLD CALC: 31 % — LOW (ref 34.5–45)
HGB BLD-MCNC: 10 G/DL — LOW (ref 11.5–15.5)
IMM GRANULOCYTES NFR BLD AUTO: 0.3 % — SIGNIFICANT CHANGE UP (ref 0–0.9)
LYMPHOCYTES # BLD AUTO: 2.67 K/UL — SIGNIFICANT CHANGE UP (ref 1–3.3)
LYMPHOCYTES # BLD AUTO: 37.8 % — SIGNIFICANT CHANGE UP (ref 13–44)
MCHC RBC-ENTMCNC: 29.2 PG — SIGNIFICANT CHANGE UP (ref 27–34)
MCHC RBC-ENTMCNC: 32.3 GM/DL — SIGNIFICANT CHANGE UP (ref 32–36)
MCV RBC AUTO: 90.4 FL — SIGNIFICANT CHANGE UP (ref 80–100)
MONOCYTES # BLD AUTO: 0.63 K/UL — SIGNIFICANT CHANGE UP (ref 0–0.9)
MONOCYTES NFR BLD AUTO: 8.9 % — SIGNIFICANT CHANGE UP (ref 2–14)
NEUTROPHILS # BLD AUTO: 3.64 K/UL — SIGNIFICANT CHANGE UP (ref 1.8–7.4)
NEUTROPHILS NFR BLD AUTO: 51.6 % — SIGNIFICANT CHANGE UP (ref 43–77)
NRBC # BLD: 0 /100 WBCS — SIGNIFICANT CHANGE UP (ref 0–0)
PLATELET # BLD AUTO: 190 K/UL — SIGNIFICANT CHANGE UP (ref 150–400)
RBC # BLD: 3.43 M/UL — LOW (ref 3.8–5.2)
RBC # FLD: 15.3 % — HIGH (ref 10.3–14.5)
WBC # BLD: 7.06 K/UL — SIGNIFICANT CHANGE UP (ref 3.8–10.5)
WBC # FLD AUTO: 7.06 K/UL — SIGNIFICANT CHANGE UP (ref 3.8–10.5)

## 2024-05-13 ENCOUNTER — APPOINTMENT (OUTPATIENT)
Age: 59
End: 2024-05-13

## 2024-05-13 ENCOUNTER — RESULT REVIEW (OUTPATIENT)
Age: 59
End: 2024-05-13

## 2024-05-13 ENCOUNTER — APPOINTMENT (OUTPATIENT)
Dept: HEMATOLOGY ONCOLOGY | Facility: CLINIC | Age: 59
End: 2024-05-13
Payer: COMMERCIAL

## 2024-05-13 VITALS
HEIGHT: 63 IN | DIASTOLIC BLOOD PRESSURE: 79 MMHG | SYSTOLIC BLOOD PRESSURE: 126 MMHG | OXYGEN SATURATION: 97 % | HEART RATE: 74 BPM | TEMPERATURE: 98 F | WEIGHT: 177.6 LBS | BODY MASS INDEX: 31.47 KG/M2

## 2024-05-13 LAB
ALBUMIN SERPL ELPH-MCNC: 4.3 G/DL
ALP BLD-CCNC: 112 U/L
ALT SERPL-CCNC: 13 U/L
ANION GAP SERPL CALC-SCNC: 11 MMOL/L
AST SERPL-CCNC: 16 U/L
BASOPHILS # BLD AUTO: 0.07 K/UL — SIGNIFICANT CHANGE UP (ref 0–0.2)
BASOPHILS NFR BLD AUTO: 0.9 % — SIGNIFICANT CHANGE UP (ref 0–2)
BILIRUB SERPL-MCNC: 0.3 MG/DL
BUN SERPL-MCNC: 17 MG/DL
CALCIUM SERPL-MCNC: 9.5 MG/DL
CHLORIDE SERPL-SCNC: 105 MMOL/L
CO2 SERPL-SCNC: 25 MMOL/L
CREAT SERPL-MCNC: 0.91 MG/DL
EGFR: 74 ML/MIN/1.73M2
EOSINOPHIL # BLD AUTO: 0.04 K/UL — SIGNIFICANT CHANGE UP (ref 0–0.5)
EOSINOPHIL NFR BLD AUTO: 0.5 % — SIGNIFICANT CHANGE UP (ref 0–6)
GLUCOSE SERPL-MCNC: 86 MG/DL
HCT VFR BLD CALC: 29 % — LOW (ref 34.5–45)
HGB BLD-MCNC: 9.3 G/DL — LOW (ref 11.5–15.5)
IMM GRANULOCYTES NFR BLD AUTO: 0.1 % — SIGNIFICANT CHANGE UP (ref 0–0.9)
LYMPHOCYTES # BLD AUTO: 2.98 K/UL — SIGNIFICANT CHANGE UP (ref 1–3.3)
LYMPHOCYTES # BLD AUTO: 38.9 % — SIGNIFICANT CHANGE UP (ref 13–44)
MAGNESIUM SERPL-MCNC: 2 MG/DL
MCHC RBC-ENTMCNC: 29.2 PG — SIGNIFICANT CHANGE UP (ref 27–34)
MCHC RBC-ENTMCNC: 32.1 GM/DL — SIGNIFICANT CHANGE UP (ref 32–36)
MCV RBC AUTO: 91.2 FL — SIGNIFICANT CHANGE UP (ref 80–100)
MONOCYTES # BLD AUTO: 0.55 K/UL — SIGNIFICANT CHANGE UP (ref 0–0.9)
MONOCYTES NFR BLD AUTO: 7.2 % — SIGNIFICANT CHANGE UP (ref 2–14)
NEUTROPHILS # BLD AUTO: 4.01 K/UL — SIGNIFICANT CHANGE UP (ref 1.8–7.4)
NEUTROPHILS NFR BLD AUTO: 52.4 % — SIGNIFICANT CHANGE UP (ref 43–77)
NRBC # BLD: 0 /100 WBCS — SIGNIFICANT CHANGE UP (ref 0–0)
PLATELET # BLD AUTO: 207 K/UL — SIGNIFICANT CHANGE UP (ref 150–400)
POTASSIUM SERPL-SCNC: 4 MMOL/L
PROT SERPL-MCNC: 7 G/DL
RBC # BLD: 3.18 M/UL — LOW (ref 3.8–5.2)
RBC # FLD: 15.1 % — HIGH (ref 10.3–14.5)
SODIUM SERPL-SCNC: 141 MMOL/L
WBC # BLD: 7.66 K/UL — SIGNIFICANT CHANGE UP (ref 3.8–10.5)
WBC # FLD AUTO: 7.66 K/UL — SIGNIFICANT CHANGE UP (ref 3.8–10.5)

## 2024-05-13 PROCEDURE — G2211 COMPLEX E/M VISIT ADD ON: CPT

## 2024-05-13 PROCEDURE — 99214 OFFICE O/P EST MOD 30 MIN: CPT

## 2024-05-14 LAB
APPEARANCE UR: CLEAR — SIGNIFICANT CHANGE UP
BILIRUB UR-MCNC: NEGATIVE — SIGNIFICANT CHANGE UP
COLOR SPEC: YELLOW — SIGNIFICANT CHANGE UP
DIFF PNL FLD: NEGATIVE — SIGNIFICANT CHANGE UP
GLUCOSE UR QL: NEGATIVE MG/DL — SIGNIFICANT CHANGE UP
KETONES UR-MCNC: NEGATIVE MG/DL — SIGNIFICANT CHANGE UP
LEUKOCYTE ESTERASE UR-ACNC: NEGATIVE — SIGNIFICANT CHANGE UP
NITRITE UR-MCNC: NEGATIVE — SIGNIFICANT CHANGE UP
PH UR: 6 — SIGNIFICANT CHANGE UP (ref 5–8)
PROT UR-MCNC: NEGATIVE MG/DL — SIGNIFICANT CHANGE UP
SP GR SPEC: 1.01 — SIGNIFICANT CHANGE UP (ref 1–1.03)
UROBILINOGEN FLD QL: 0.2 MG/DL — SIGNIFICANT CHANGE UP (ref 0.2–1)

## 2024-05-15 NOTE — HISTORY OF PRESENT ILLNESS
[de-identified] : Referred by: hospital follow up  Gosia (daughter) [phone], Rosalina (lives in [place])  Her daughter translates per patient preference   Cancer Summary:  DIAGNOSIS: peritoneal carcinomatosis  PROCEDURE AND DATE: Abdominal lesion core biopsy 10/6/23 PATHOLOGY: metastatic carcinoma, the IHC profile is compatible w/ mullerian primary  STAGE: IV Chemotherapy:  C1 Carbo/taxol 11/2/23 C2 Carbo/taxol/blas 11/24/23 (reacted to Taxol) C3 carbo/abraxane/blas 12/14/23 C4 carbo/abraxane/blas 1/4/24 C5 carbo/abraxane - 4/19/24 C6 carbo/abraxane - 5/13/24 Radiation: N/A Hormonal: N/A STATUS: active, on chemotherapy  Genetics STATUS:  Foundation testing 10/6/23- TPS 0%, TC 0%, PDL1 0 IZAIAH score 24.5%, HRD Positive- likely benefit from maintenance niraparib  MS-Stable MTAP loss, NF1 mut, TP53 mut, CDKN2A/B loss  Ms. Mckeon presented at age 58 in October 2023 for evaluation of advanced ovarian cancer, peritoneal carcinomatosis. The patient has a medical history of HTN, HLD.    Antonella presented with her 2 daughters (one of whom is in from [place]). She initially presented to [facility] in July 2023 with SOB, difficulty breathing- found to have a pleural effusion which was attributed to cardiac etiology, s/p thoracentesis. She generaly does not follow with health care providers and was not up to date on any health care maintenance. She was then readmitted to [facility] from 10/1/23 to 10/8/23 for SOB/JACKSON, workup revealed peritoneal carcinomatosis with ascites as well as right sided pleural effusion. She was evaluated by Dr. Karolina Rodriguez and underwent repeat imaging w/ abdomen pelvis CAP w/ contrast on 10/3/23- revealed diffuse peritoneal caricnomatosis, no cystic ovarian lesion, loculated left pelvic sidewall fluid collection/cystic lesion measuring 2.3cm x 1.4cm x 6cm. 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 She presents to discuss options for systemic therapy.   Her breathing had improved, remained on lasix. She denies fevers, chills, CP, nausea, vomiting, diarrhea. She does report a poor appetite and early satiety. She has lost 7lb since discharge from the hospital. Her LMP was 4 years ago. Denied any recent vaginal bleeding. She has not seen a gynecologist in many years. She has had 2 prior pregnancies and 2 live births, age at first birth was 23.    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 Splenic flexure mobilization, infra gastric omentectomy, resection of falsiform ligament, appendectomy, cystotomy repair, cytoreductive surgery HIPEC- 2/6/24-  Bilateral ovaries with high grade mullerian carcinoma and tubo-ovarian adhesions Appendix with metastatic carcinoma,  Uterine serosa with adhesion to bowel and metastatic carcinoma One identified fallopian tube with metastatic carcinoma Favored to be high grade serous carcinoma.  ypT3Nx  Genetics: [facility] panel 10/16/23- Negative, no pathogenic mutations or VUS    HCM: - Colonoscopy: never done  - Gyn: never done  - Mammo: never done  - Lung cancer screen: never done (but had recent chest imaging)  - DEXA: never done    SH: - Occupation: works part time as a   - Living situation: lives in [place] w/ her , 2 children  - Smoking/etoh/illicits: former smoker, quit 2 months ago, denies etoh  - Exercise: not very physically active at this time   FH: - Her father had prostate cancer  [de-identified] : Antonella presents today for follow up on 5/13/24 for peritoneal carcinomatosis.  Accompanied by her daughter.  She completed 4 cycles of chemotherapy and subsequently underwent cytoreductive surgery w/ HIPEC on 2/6/24 with Dr. Miroslava Perez which revealed high grade serous ovarian cancer, pT3Nx. She had a very difficult post-operative course with recurrent hospitalization for bladder fistula, drain infection.  Resumed C5 abraxane/carbo on 4/22/24   At today's visit Antonella reports feeling generally well (requests to have her daughter function as ).  Noted intermittent discomfort of the left upper abdomen/ribs for several days after C5 (asymptomatic since that time).  She also reports what sounds like urinary leakage at hs (less throughout the day).  No dysuria/frequency or odor.  She completed Augmentin for drain infection after last visit, remains afebrile. No issues w/ ostomy. Her appetite is good and her weight is increasing. Denies recent headaches, visual changes, balance issues, CP, cough, SOB, n/v/d, constipation, unintentional weight loss or new bone or back pain.  Genetics: [facility] panel 10/16/23- Negative, no pathogenic mutations or VUS  Foundation testing 10/6/23- TPS 0%, TC 0%, PDL1 0 IZAIAH score 24.5%, HRD Positive- likely benefit from maintenance niraparib  MS-Stable MTAP loss, NF1 mut, TP53 mut, CDKN2A/B loss

## 2024-05-15 NOTE — PHYSICAL EXAM
[Restricted in physically strenuous activity but ambulatory and able to carry out work of a light or sedentary nature] : Status 1- Restricted in physically strenuous activity but ambulatory and able to carry out work of a light or sedentary nature, e.g., light house work, office work [Normal] : affect appropriate [de-identified] : generally well appearing female, NAD, pleasant  [de-identified] : vertical incision, well healed, ostomy in place

## 2024-05-15 NOTE — RESULTS/DATA
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   We discussed that management of stage IV ovarian cancer is palliative in nature and goals will be to prolong her life and improve her symptoms.  She will likely require systemic therapy prior to any debulking surgery.  We referred her to Dr. Miroslava Perez at [facility] from gynecologic oncology.  We recommended carbo/taxo/bevacizumab x6 cycles, followed by maintenance therapy likely with niraparib.   Recommend treatment w/ carboplatin AUC 5-6 IV q 21 days with taxol 175mg/m2 IV and avastin (bevacizumab) every 21 days for 6 cycles for treatment of stage IV ovarian cancer.  C1 chemotherapy w/ carbo/taxol initiated on 11/2/23. Taxol reaction at last visit with C2- Switched to carbo/abraxane/blas. She is now s/p cytoreductive surgery w/ HIPEC on 2/6/24 with Dr. Miroslava Perez which revealed high grade serous ovarian cancer, pT3Nx.  Post-op course c/b fistula and drain infection, remains on augmentin but is feeling much better.  Drain removed; no issues w/ ostomy Resumed C5 chemotherapy w/ carbo/abraxane (HOLD blas) on 4/19/24. - tolerated well overall; proceed w/ C6 today if counts stable Will give CCUA/UC today for what sounds like recent urinary incontinence (worse at hs) Case reviewed w/ Dr. Prieto; will proceed w/ one additional cycle of chemotherapy (C7) w/ office visit in 3 weeks followed by niraparib maintenance.   Foundation testing 10/6/23- TPS 0%, TC 0%, PDL1 0 IZAIAH score 24.5%, HRD Positive- likely benefit from maintenance niraparib  MS-Stable MTAP loss, NF1 mut, TP53 mut, CDKN2A/B loss [facility] germline testing 10/16/23- negative for pathogenic mutations or VUS   All patient questions answered at today's visit. Patient urged to call the office with any questions or concerns.   I personally have spent a total of 35 minutes of time on the date of this encounter reviewing test results, documenting findings, coordinating care and directly consulting with the patient and/or designated family member. Greater than 50% of the face to face encounter time was spent on counseling and/or coordination of care for high grade serous ovarian cancer.

## 2024-05-31 ENCOUNTER — APPOINTMENT (OUTPATIENT)
Dept: HEMATOLOGY ONCOLOGY | Facility: CLINIC | Age: 59
End: 2024-05-31

## 2024-05-31 ENCOUNTER — RESULT REVIEW (OUTPATIENT)
Age: 59
End: 2024-05-31

## 2024-05-31 LAB
BASOPHILS # BLD AUTO: 0.03 K/UL — SIGNIFICANT CHANGE UP (ref 0–0.2)
BASOPHILS NFR BLD AUTO: 0.5 % — SIGNIFICANT CHANGE UP (ref 0–2)
EOSINOPHIL # BLD AUTO: 0.01 K/UL — SIGNIFICANT CHANGE UP (ref 0–0.5)
EOSINOPHIL NFR BLD AUTO: 0.2 % — SIGNIFICANT CHANGE UP (ref 0–6)
HCT VFR BLD CALC: 27.5 % — LOW (ref 34.5–45)
HGB BLD-MCNC: 9.1 G/DL — LOW (ref 11.5–15.5)
IMM GRANULOCYTES NFR BLD AUTO: 0.5 % — SIGNIFICANT CHANGE UP (ref 0–0.9)
LYMPHOCYTES # BLD AUTO: 2.41 K/UL — SIGNIFICANT CHANGE UP (ref 1–3.3)
LYMPHOCYTES # BLD AUTO: 41.9 % — SIGNIFICANT CHANGE UP (ref 13–44)
MCHC RBC-ENTMCNC: 30.5 PG — SIGNIFICANT CHANGE UP (ref 27–34)
MCHC RBC-ENTMCNC: 33.1 GM/DL — SIGNIFICANT CHANGE UP (ref 32–36)
MCV RBC AUTO: 92.3 FL — SIGNIFICANT CHANGE UP (ref 80–100)
MONOCYTES # BLD AUTO: 0.64 K/UL — SIGNIFICANT CHANGE UP (ref 0–0.9)
MONOCYTES NFR BLD AUTO: 11.1 % — SIGNIFICANT CHANGE UP (ref 2–14)
NEUTROPHILS # BLD AUTO: 2.63 K/UL — SIGNIFICANT CHANGE UP (ref 1.8–7.4)
NEUTROPHILS NFR BLD AUTO: 45.8 % — SIGNIFICANT CHANGE UP (ref 43–77)
NRBC # BLD: 0 /100 WBCS — SIGNIFICANT CHANGE UP (ref 0–0)
PLATELET # BLD AUTO: 127 K/UL — LOW (ref 150–400)
RBC # BLD: 2.98 M/UL — LOW (ref 3.8–5.2)
RBC # FLD: 15.4 % — HIGH (ref 10.3–14.5)
WBC # BLD: 5.75 K/UL — SIGNIFICANT CHANGE UP (ref 3.8–10.5)
WBC # FLD AUTO: 5.75 K/UL — SIGNIFICANT CHANGE UP (ref 3.8–10.5)

## 2024-06-03 ENCOUNTER — APPOINTMENT (OUTPATIENT)
Dept: INFUSION THERAPY | Facility: CANCER CENTER | Age: 59
End: 2024-06-03

## 2024-06-03 LAB
ALBUMIN SERPL ELPH-MCNC: 4.2 G/DL
ALP BLD-CCNC: 109 U/L
ALT SERPL-CCNC: 12 U/L
ANION GAP SERPL CALC-SCNC: 11 MMOL/L
AST SERPL-CCNC: 16 U/L
BILIRUB SERPL-MCNC: 0.2 MG/DL
BUN SERPL-MCNC: 24 MG/DL
CALCIUM SERPL-MCNC: 9.4 MG/DL
CANCER AG125 SERPL-ACNC: 36 U/ML
CHLORIDE SERPL-SCNC: 106 MMOL/L
CO2 SERPL-SCNC: 24 MMOL/L
CREAT SERPL-MCNC: 1.02 MG/DL
EGFR: 65 ML/MIN/1.73M2
GLUCOSE SERPL-MCNC: 102 MG/DL
MAGNESIUM SERPL-MCNC: 1.6 MG/DL
PHOSPHATE SERPL-MCNC: 3.2 MG/DL
POTASSIUM SERPL-SCNC: 3.8 MMOL/L
PROT SERPL-MCNC: 7 G/DL
SODIUM SERPL-SCNC: 142 MMOL/L

## 2024-06-03 PROCEDURE — 80053 COMPREHEN METABOLIC PANEL: CPT

## 2024-06-03 PROCEDURE — 81003 URINALYSIS AUTO W/O SCOPE: CPT

## 2024-06-03 PROCEDURE — 84100 ASSAY OF PHOSPHORUS: CPT

## 2024-06-03 PROCEDURE — 86304 IMMUNOASSAY TUMOR CA 125: CPT

## 2024-06-03 PROCEDURE — 85027 COMPLETE CBC AUTOMATED: CPT

## 2024-06-03 PROCEDURE — 96417 CHEMO IV INFUS EACH ADDL SEQ: CPT

## 2024-06-03 PROCEDURE — 83735 ASSAY OF MAGNESIUM: CPT

## 2024-06-03 PROCEDURE — 96413 CHEMO IV INFUSION 1 HR: CPT

## 2024-06-03 PROCEDURE — 96375 TX/PRO/DX INJ NEW DRUG ADDON: CPT

## 2024-06-21 ENCOUNTER — OUTPATIENT (OUTPATIENT)
Dept: OUTPATIENT SERVICES | Facility: HOSPITAL | Age: 59
LOS: 1 days | End: 2024-06-21
Payer: MEDICAID

## 2024-06-21 DIAGNOSIS — Z98.890 OTHER SPECIFIED POSTPROCEDURAL STATES: Chronic | ICD-10-CM

## 2024-06-21 DIAGNOSIS — C56.9 MALIGNANT NEOPLASM OF UNSPECIFIED OVARY: ICD-10-CM

## 2024-06-21 DIAGNOSIS — R11.2 NAUSEA WITH VOMITING, UNSPECIFIED: ICD-10-CM

## 2024-06-21 DIAGNOSIS — Z51.11 ENCOUNTER FOR ANTINEOPLASTIC CHEMOTHERAPY: ICD-10-CM

## 2024-06-24 ENCOUNTER — RESULT REVIEW (OUTPATIENT)
Age: 59
End: 2024-06-24

## 2024-06-24 ENCOUNTER — APPOINTMENT (OUTPATIENT)
Dept: HEMATOLOGY ONCOLOGY | Facility: CLINIC | Age: 59
End: 2024-06-24
Payer: COMMERCIAL

## 2024-06-24 VITALS
BODY MASS INDEX: 32.78 KG/M2 | DIASTOLIC BLOOD PRESSURE: 74 MMHG | HEART RATE: 84 BPM | HEIGHT: 63 IN | WEIGHT: 185 LBS | OXYGEN SATURATION: 94 % | TEMPERATURE: 98 F | SYSTOLIC BLOOD PRESSURE: 128 MMHG | RESPIRATION RATE: 16 BRPM

## 2024-06-24 LAB
BASOPHILS # BLD AUTO: 0.05 K/UL — SIGNIFICANT CHANGE UP (ref 0–0.2)
BASOPHILS NFR BLD AUTO: 0.9 % — SIGNIFICANT CHANGE UP (ref 0–2)
EOSINOPHIL # BLD AUTO: 0.01 K/UL — SIGNIFICANT CHANGE UP (ref 0–0.5)
EOSINOPHIL NFR BLD AUTO: 0.2 % — SIGNIFICANT CHANGE UP (ref 0–6)
HCT VFR BLD CALC: 27.3 % — LOW (ref 34.5–45)
HGB BLD-MCNC: 9 G/DL — LOW (ref 11.5–15.5)
IMM GRANULOCYTES NFR BLD AUTO: 0.4 % — SIGNIFICANT CHANGE UP (ref 0–0.9)
LYMPHOCYTES # BLD AUTO: 2.47 K/UL — SIGNIFICANT CHANGE UP (ref 1–3.3)
LYMPHOCYTES # BLD AUTO: 44.6 % — HIGH (ref 13–44)
MCHC RBC-ENTMCNC: 30.3 PG — SIGNIFICANT CHANGE UP (ref 27–34)
MCHC RBC-ENTMCNC: 33 GM/DL — SIGNIFICANT CHANGE UP (ref 32–36)
MCV RBC AUTO: 91.9 FL — SIGNIFICANT CHANGE UP (ref 80–100)
MONOCYTES # BLD AUTO: 0.56 K/UL — SIGNIFICANT CHANGE UP (ref 0–0.9)
MONOCYTES NFR BLD AUTO: 10.1 % — SIGNIFICANT CHANGE UP (ref 2–14)
NEUTROPHILS # BLD AUTO: 2.43 K/UL — SIGNIFICANT CHANGE UP (ref 1.8–7.4)
NEUTROPHILS NFR BLD AUTO: 43.8 % — SIGNIFICANT CHANGE UP (ref 43–77)
NRBC # BLD: 0 /100 WBCS — SIGNIFICANT CHANGE UP (ref 0–0)
PLATELET # BLD AUTO: 122 K/UL — LOW (ref 150–400)
RBC # BLD: 2.97 M/UL — LOW (ref 3.8–5.2)
RBC # FLD: 15.9 % — HIGH (ref 10.3–14.5)
WBC # BLD: 5.54 K/UL — SIGNIFICANT CHANGE UP (ref 3.8–10.5)
WBC # FLD AUTO: 5.54 K/UL — SIGNIFICANT CHANGE UP (ref 3.8–10.5)

## 2024-06-24 PROCEDURE — 99214 OFFICE O/P EST MOD 30 MIN: CPT

## 2024-06-24 PROCEDURE — G2211 COMPLEX E/M VISIT ADD ON: CPT

## 2024-06-24 RX ORDER — NIRAPARIB 200 MG/1
200 TABLET, FILM COATED ORAL
Qty: 30 | Refills: 5 | Status: ACTIVE | COMMUNITY
Start: 2024-06-24 | End: 1900-01-01

## 2024-06-25 ENCOUNTER — APPOINTMENT (OUTPATIENT)
Dept: ULTRASOUND IMAGING | Facility: CLINIC | Age: 59
End: 2024-06-25
Payer: COMMERCIAL

## 2024-06-25 LAB
ALBUMIN SERPL ELPH-MCNC: 4.5 G/DL
ALP BLD-CCNC: 119 U/L
ALT SERPL-CCNC: 13 U/L
ANION GAP SERPL CALC-SCNC: 12 MMOL/L
AST SERPL-CCNC: 17 U/L
BILIRUB SERPL-MCNC: 0.4 MG/DL
BUN SERPL-MCNC: 22 MG/DL
CALCIUM SERPL-MCNC: 9.4 MG/DL
CANCER AG125 SERPL-ACNC: 47 U/ML
CHLORIDE SERPL-SCNC: 100 MMOL/L
CO2 SERPL-SCNC: 26 MMOL/L
CREAT SERPL-MCNC: 1.05 MG/DL
EGFR: 62 ML/MIN/1.73M2
GLUCOSE SERPL-MCNC: 98 MG/DL
POTASSIUM SERPL-SCNC: 4.2 MMOL/L
PROT SERPL-MCNC: 7.7 G/DL
SODIUM SERPL-SCNC: 138 MMOL/L

## 2024-06-25 PROCEDURE — 93971 EXTREMITY STUDY: CPT | Mod: LT

## 2024-06-27 DIAGNOSIS — C56.9 MALIGNANT NEOPLASM OF UNSPECIFIED OVARY: ICD-10-CM

## 2024-07-01 ENCOUNTER — RESULT REVIEW (OUTPATIENT)
Age: 59
End: 2024-07-01

## 2024-07-01 ENCOUNTER — APPOINTMENT (OUTPATIENT)
Dept: HEMATOLOGY ONCOLOGY | Facility: CLINIC | Age: 59
End: 2024-07-01

## 2024-07-01 LAB
BASOPHILS # BLD AUTO: 0.02 K/UL — SIGNIFICANT CHANGE UP (ref 0–0.2)
BASOPHILS NFR BLD AUTO: 0.5 % — SIGNIFICANT CHANGE UP (ref 0–2)
EOSINOPHIL # BLD AUTO: 0.02 K/UL — SIGNIFICANT CHANGE UP (ref 0–0.5)
EOSINOPHIL NFR BLD AUTO: 0.5 % — SIGNIFICANT CHANGE UP (ref 0–6)
HCT VFR BLD CALC: 26.4 % — LOW (ref 34.5–45)
HGB BLD-MCNC: 8.9 G/DL — LOW (ref 11.5–15.5)
IMM GRANULOCYTES NFR BLD AUTO: 0.2 % — SIGNIFICANT CHANGE UP (ref 0–0.9)
LYMPHOCYTES # BLD AUTO: 1.93 K/UL — SIGNIFICANT CHANGE UP (ref 1–3.3)
LYMPHOCYTES # BLD AUTO: 45.5 % — HIGH (ref 13–44)
MCHC RBC-ENTMCNC: 31.1 PG — SIGNIFICANT CHANGE UP (ref 27–34)
MCHC RBC-ENTMCNC: 33.7 GM/DL — SIGNIFICANT CHANGE UP (ref 32–36)
MCV RBC AUTO: 92.3 FL — SIGNIFICANT CHANGE UP (ref 80–100)
MONOCYTES # BLD AUTO: 0.46 K/UL — SIGNIFICANT CHANGE UP (ref 0–0.9)
MONOCYTES NFR BLD AUTO: 10.8 % — SIGNIFICANT CHANGE UP (ref 2–14)
NEUTROPHILS # BLD AUTO: 1.8 K/UL — SIGNIFICANT CHANGE UP (ref 1.8–7.4)
NEUTROPHILS NFR BLD AUTO: 42.5 % — LOW (ref 43–77)
NRBC # BLD: 0 /100 WBCS — SIGNIFICANT CHANGE UP (ref 0–0)
PLATELET # BLD AUTO: 181 K/UL — SIGNIFICANT CHANGE UP (ref 150–400)
RBC # BLD: 2.86 M/UL — LOW (ref 3.8–5.2)
RBC # FLD: 16.3 % — HIGH (ref 10.3–14.5)
WBC # BLD: 4.24 K/UL — SIGNIFICANT CHANGE UP (ref 3.8–10.5)
WBC # FLD AUTO: 4.24 K/UL — SIGNIFICANT CHANGE UP (ref 3.8–10.5)

## 2024-07-02 LAB
ALBUMIN SERPL ELPH-MCNC: 4.1 G/DL
ALP BLD-CCNC: 105 U/L
ALT SERPL-CCNC: 11 U/L
ANION GAP SERPL CALC-SCNC: 15 MMOL/L
AST SERPL-CCNC: 13 U/L
BILIRUB SERPL-MCNC: 0.3 MG/DL
BUN SERPL-MCNC: 21 MG/DL
CALCIUM SERPL-MCNC: 9.4 MG/DL
CHLORIDE SERPL-SCNC: 103 MMOL/L
CO2 SERPL-SCNC: 24 MMOL/L
CREAT SERPL-MCNC: 1.04 MG/DL
EGFR: 63 ML/MIN/1.73M2
GLUCOSE SERPL-MCNC: 97 MG/DL
POTASSIUM SERPL-SCNC: 3.8 MMOL/L
PROT SERPL-MCNC: 7.2 G/DL
SODIUM SERPL-SCNC: 142 MMOL/L

## 2024-07-22 ENCOUNTER — APPOINTMENT (OUTPATIENT)
Dept: HEMATOLOGY ONCOLOGY | Facility: CLINIC | Age: 59
End: 2024-07-22
Payer: COMMERCIAL

## 2024-07-22 ENCOUNTER — RESULT REVIEW (OUTPATIENT)
Age: 59
End: 2024-07-22

## 2024-07-22 VITALS
TEMPERATURE: 97.3 F | BODY MASS INDEX: 33.3 KG/M2 | OXYGEN SATURATION: 96 % | SYSTOLIC BLOOD PRESSURE: 124 MMHG | DIASTOLIC BLOOD PRESSURE: 77 MMHG | HEART RATE: 93 BPM | WEIGHT: 188 LBS

## 2024-07-22 VITALS
SYSTOLIC BLOOD PRESSURE: 119 MMHG | WEIGHT: 187 LBS | RESPIRATION RATE: 17 BRPM | HEART RATE: 87 BPM | HEIGHT: 63 IN | OXYGEN SATURATION: 97 % | BODY MASS INDEX: 33.13 KG/M2 | DIASTOLIC BLOOD PRESSURE: 75 MMHG

## 2024-07-22 LAB
BASOPHILS # BLD AUTO: 0.08 K/UL — SIGNIFICANT CHANGE UP (ref 0–0.2)
BASOPHILS NFR BLD AUTO: 1 % — SIGNIFICANT CHANGE UP (ref 0–2)
EOSINOPHIL # BLD AUTO: 0.07 K/UL — SIGNIFICANT CHANGE UP (ref 0–0.5)
EOSINOPHIL NFR BLD AUTO: 0.9 % — SIGNIFICANT CHANGE UP (ref 0–6)
HCT VFR BLD CALC: 29 % — LOW (ref 34.5–45)
HGB BLD-MCNC: 9.7 G/DL — LOW (ref 11.5–15.5)
IMM GRANULOCYTES NFR BLD AUTO: 0.2 % — SIGNIFICANT CHANGE UP (ref 0–0.9)
LYMPHOCYTES # BLD AUTO: 2.02 K/UL — SIGNIFICANT CHANGE UP (ref 1–3.3)
LYMPHOCYTES # BLD AUTO: 25 % — SIGNIFICANT CHANGE UP (ref 13–44)
MCHC RBC-ENTMCNC: 31.6 PG — SIGNIFICANT CHANGE UP (ref 27–34)
MCHC RBC-ENTMCNC: 33.4 GM/DL — SIGNIFICANT CHANGE UP (ref 32–36)
MCV RBC AUTO: 94.5 FL — SIGNIFICANT CHANGE UP (ref 80–100)
MONOCYTES # BLD AUTO: 0.93 K/UL — HIGH (ref 0–0.9)
MONOCYTES NFR BLD AUTO: 11.5 % — SIGNIFICANT CHANGE UP (ref 2–14)
NEUTROPHILS # BLD AUTO: 4.97 K/UL — SIGNIFICANT CHANGE UP (ref 1.8–7.4)
NEUTROPHILS NFR BLD AUTO: 61.4 % — SIGNIFICANT CHANGE UP (ref 43–77)
NRBC # BLD: 0 /100 WBCS — SIGNIFICANT CHANGE UP (ref 0–0)
PLATELET # BLD AUTO: 312 K/UL — SIGNIFICANT CHANGE UP (ref 150–400)
RBC # BLD: 3.07 M/UL — LOW (ref 3.8–5.2)
RBC # FLD: 15.1 % — HIGH (ref 10.3–14.5)
WBC # BLD: 8.09 K/UL — SIGNIFICANT CHANGE UP (ref 3.8–10.5)
WBC # FLD AUTO: 8.09 K/UL — SIGNIFICANT CHANGE UP (ref 3.8–10.5)

## 2024-07-22 PROCEDURE — 99214 OFFICE O/P EST MOD 30 MIN: CPT

## 2024-07-22 PROCEDURE — G2211 COMPLEX E/M VISIT ADD ON: CPT

## 2024-07-22 NOTE — HISTORY OF PRESENT ILLNESS
[de-identified] : Referred by: hospital follow up  Gosia (daughter) [phone], Rosalina (lives in [place])  Her daughter translates per patient preference   Cancer Summary:  DIAGNOSIS: peritoneal carcinomatosis  PROCEDURE AND DATE: Abdominal lesion core biopsy 10/6/23 PATHOLOGY: metastatic carcinoma, the IHC profile is compatible w/ mullerian primary  cytoreductive surgery w/ HIPEC on 2/6/24 with Dr. Miroslava Perez which revealed high grade serous ovarian cancer, pT3Nx.  STAGE: IV Chemotherapy:  C1 Carbo/taxol 11/2/23 C2 Carbo/taxol/blas 11/24/23 (reacted to Taxol) C3 carbo/abraxane/blas 12/14/23 C4 carbo/abraxane/blas 1/4/24 C5 carbo/abraxane - 4/19/24 C6 carbo/abraxane - 5/13/24 C7 carbo/abraxane - 6/3/24 Radiation: N/A Hormonal: N/A STATUS: active, on chemotherapy  Genetics STATUS:  Foundation testing 10/6/23- TPS 0%, TC 0%, PDL1 0 IZAIAH score 24.5%, HRD Positive- likely benefit from maintenance niraparib  MS-Stable MTAP loss, NF1 mut, TP53 mut, CDKN2A/B loss  Ms. Mckeon presented at age 58 in October 2023 for evaluation of advanced ovarian cancer, peritoneal carcinomatosis. The patient has a medical history of HTN, HLD.    Antonella presented with her 2 daughters (one of whom is in from [place]). She initially presented to [facility] in July 2023 with SOB, difficulty breathing- found to have a pleural effusion which was attributed to cardiac etiology, s/p thoracentesis. She generaly does not follow with health care providers and was not up to date on any health care maintenance. She was then readmitted to [facility] from 10/1/23 to 10/8/23 for SOB/JACKSON, workup revealed peritoneal carcinomatosis with ascites as well as right sided pleural effusion. She was evaluated by Dr. Karolina Rodriguez and underwent repeat imaging w/ abdomen pelvis CAP w/ contrast on 10/3/23- revealed diffuse peritoneal caricnomatosis, no cystic ovarian lesion, loculated left pelvic sidewall fluid collection/cystic lesion measuring 2.3cm x 1.4cm x 6cm. 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 She presents today to discuss options for systemic therapy.   She reported her breathing has improved, remains on lasix. She denies fevers, chills, CP, nausea, vomiting, diarrhea. She does report a poor appetite and early satiety. She has lost 7lb since discharge from the hospital. Her LMP was 4 years ago. Denies any recent vaginal bleeding. She has not seen a gynecologist in many years. She has had 2 prior pregnancies and 2 live births, age at first birth was 23.    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 Splenic flexure mobilization, infra gastric omentectomy, resection of falsiform ligament, appendectomy, cystotomy repair, cytoreductive surgery HIPEC- 2/6/24-  Bilateral ovaries with high grade mullerian carcinoma and tubo-ovarian adhesions Appendix with metastatic carcinoma,  Uterine serosa with adhesion to bowel and metastatic carcinoma One identified fallopian tube with metastatic carcinoma Favored to be high grade serous carcinoma.  ypT3Nx  Genetics: [facility] panel 10/16/23- Negative, no pathogenic mutations or VUS    HCM: - Colonoscopy: never done  - Gyn: never done  - Mammo: never done  - Lung cancer screen: never done (but had recent chest imaging)  - DEXA: never done    SH: - Occupation: works part time as a   - Living situation: lives in [place] w/ her , 2 children  - Smoking/etoh/illicits: former smoker, quit 2 months ago, denies etoh  - Exercise: not very physically active at this time   FH: - Her father had prostate cancer  [de-identified] : Antonella presents today for follow up on 7/22/24 for peritoneal carcinomatosis.  Started Niraparib 200 mg at hs on 7/19/24  Here today with her daughter who translates per her request.  Started Niraparib 3 days ago.  Notes occasional facial flushing and skin changes "feels prickly" - no discrete rash or itching.  Also with mild constipation and nausea.  Previously noted "lump" in her left leg has essentially resolved - duplex US was negative. Denies recent headaches, visual changes, balance issues, CP, cough, SOB, n/v/d, constipation, unintentional weight loss or new bone or back pain. Reports grade I neuropathy in her toes, has no issues walking.   Genetics: [facility] panel 10/16/23- Negative, no pathogenic mutations or VUS Foundation testing 10/6/23- TPS 0%, TC 0%, PDL1 0 IZAIAH score 24.5%, HRD Positive- likely benefit from maintenance niraparib MS-Stable MTAP loss, NF1 mut, TP53 mut, CDKN2A/B loss.  Labs today reviewed and c/f:  WBC 8.09, hgb 9.7, plt 312

## 2024-07-22 NOTE — RESULTS/DATA
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   We discussed that management of stage IV ovarian cancer is palliative in nature and goals will be to prolong her life and improve her symptoms.  She will likely require systemic therapy prior to any debulking surgery.  We referred her to Dr. Miroslava Perez at [facility] from gynecologic oncology.  Will recommend carbo/taxo/bevacizumab for a total of 6 cycles at this time, followed by maintenance therapy likely with niraparib.   Recommend treatment w/ carboplatin AUC 5-6 IV q 21 days with taxol 175mg/m2 IV and avastin (bevacizumab) every 21 days for 6 cycles for treatment of stage IV ovarian cancer.  C1 chemotherapy w/ carbo/taxol initiated on 11/2/23. Taxol reaction at last visit with C2- Switched to carbo/abraxane/blas. She is now s/p cytoreductive surgery w/ HIPEC on 2/6/24 with Dr. Miroslava Perez which revealed high grade serous ovarian cancer, pT3Nx.  Post-op course c/b fistula and drain infection, remains on augmentin but is feeling much better.  She completed C7 carbo/abraxane on 6/3/24.   Start niraparib 200mg PO daily on 7/19/24 - notes facial flushing, mild nausea/constipation since drug start  Will uptitrate to 300 if well tolerated.  Risks/benefits and side effects discussed in detail at today's visit.  Given script for repeat PET/CT ~ 8/8/24 prior to next visit w/ Dr. Prieto on 8/15 (labs prior to appointment)  Foundation testing 10/6/23- TPS 0%, TC 0%, PDL1 0 IZAIAH score 24.5%, HRD Positive- likely benefit from maintenance niraparib  MS-Stable MTAP loss, NF1 mut, TP53 mut, CDKN2A/B loss [facility] germline testing 10/16/23- negative for pathogenic mutations or VUS   All patient questions answered at today's visit. Patient urged to call the office with any questions or concerns.   I personally have spent a total of 35 minutes of time on the date of this encounter reviewing test results, documenting findings, coordinating care and directly consulting with the patient and/or designated family member. Greater than 50% of the face to face encounter time was spent on counseling and/or coordination of care for high grade serous ovarian cancer.

## 2024-07-22 NOTE — PHYSICAL EXAM
[Fully active, able to carry on all pre-disease performance without restriction] : Status 0 - Fully active, able to carry on all pre-disease performance without restriction [Normal] : affect appropriate [de-identified] : generally well appearing female, NAD, pleasant, mild facial flushing  [de-identified] : vertical incision, well healed, ostomy in place

## 2024-07-22 NOTE — RESULTS/DATA
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   We discussed that management of stage IV ovarian cancer is palliative in nature and goals will be to prolong her life and improve her symptoms.  She will likely require systemic therapy prior to any debulking surgery.  We referred her to Dr. Miroslava Perez at [facility] from gynecologic oncology.  Will recommend carbo/taxo/bevacizumab for a total of 6 cycles at this time, followed by maintenance therapy likely with niraparib.   Recommend treatment w/ carboplatin AUC 5-6 IV q 21 days with taxol 175mg/m2 IV and avastin (bevacizumab) every 21 days for 6 cycles for treatment of stage IV ovarian cancer.  C1 chemotherapy w/ carbo/taxol initiated on 11/2/23. Taxol reaction at last visit with C2- Switched to carbo/abraxane/blas. She is now s/p cytoreductive surgery w/ HIPEC on 2/6/24 with Dr. Miroslava Perez which revealed high grade serous ovarian cancer, pT3Nx.  Post-op course c/b fistula and drain infection, remains on augmentin but is feeling much better.  She completed C7 carbo/abraxane on 6/3/24.   Start niraparib 200mg PO daily on 7/19/24 - notes facial flushing, mild nausea/constipation since drug start  Will uptitrate to 300 if well tolerated.  Risks/benefits and side effects discussed in detail at today's visit.  Given script for repeat PET/CT ~ 8/8/24 prior to next visit w/ Dr. Prieto on 8/15 (labs prior to appointment)  Foundation testing 10/6/23- TPS 0%, TC 0%, PDL1 0 IZAIAH score 24.5%, HRD Positive- likely benefit from maintenance niraparib  MS-Stable MTAP loss, NF1 mut, TP53 mut, CDKN2A/B loss Myriad germline testing 10/16/23- negative for pathogenic mutations or VUS   All patient questions answered at today's visit. Patient urged to call the office with any questions or concerns.   I personally have spent a total of 35 minutes of time on the date of this encounter reviewing test results, documenting findings, coordinating care and directly consulting with the patient and/or designated family member. Greater than 50% of the face to face encounter time was spent on counseling and/or coordination of care for high grade serous ovarian cancer.

## 2024-07-22 NOTE — PHYSICAL EXAM
[Fully active, able to carry on all pre-disease performance without restriction] : Status 0 - Fully active, able to carry on all pre-disease performance without restriction [Normal] : affect appropriate [de-identified] : generally well appearing female, NAD, pleasant, mild facial flushing  [de-identified] : vertical incision, well healed, ostomy in place

## 2024-07-22 NOTE — HISTORY OF PRESENT ILLNESS
[de-identified] : Referred by: hospital follow up  Gosia (daughter) [phone], Rosalina (lives in [place])  Her daughter translates per patient preference   Cancer Summary:  DIAGNOSIS: peritoneal carcinomatosis  PROCEDURE AND DATE: Abdominal lesion core biopsy 10/6/23 PATHOLOGY: metastatic carcinoma, the IHC profile is compatible w/ mullerian primary  cytoreductive surgery w/ HIPEC on 2/6/24 with Dr. Miroslava Perez which revealed high grade serous ovarian cancer, pT3Nx.  STAGE: IV Chemotherapy:  C1 Carbo/taxol 11/2/23 C2 Carbo/taxol/blas 11/24/23 (reacted to Taxol) C3 carbo/abraxane/blas 12/14/23 C4 carbo/abraxane/blas 1/4/24 C5 carbo/abraxane - 4/19/24 C6 carbo/abraxane - 5/13/24 C7 carbo/abraxane - 6/3/24 Radiation: N/A Hormonal: N/A STATUS: active, on chemotherapy  Genetics STATUS:  Foundation testing 10/6/23- TPS 0%, TC 0%, PDL1 0 IZAIAH score 24.5%, HRD Positive- likely benefit from maintenance niraparib  MS-Stable MTAP loss, NF1 mut, TP53 mut, CDKN2A/B loss  Ms. Mckeon presented at age 58 in October 2023 for evaluation of advanced ovarian cancer, peritoneal carcinomatosis. The patient has a medical history of HTN, HLD.    Antonella presented with her 2 daughters (one of whom is in from [place]). She initially presented to [facility] in July 2023 with SOB, difficulty breathing- found to have a pleural effusion which was attributed to cardiac etiology, s/p thoracentesis. She generaly does not follow with health care providers and was not up to date on any health care maintenance. She was then readmitted to [facility] from 10/1/23 to 10/8/23 for SOB/JACKSON, workup revealed peritoneal carcinomatosis with ascites as well as right sided pleural effusion. She was evaluated by Dr. Karolina Rodriguez and underwent repeat imaging w/ abdomen pelvis CAP w/ contrast on 10/3/23- revealed diffuse peritoneal caricnomatosis, no cystic ovarian lesion, loculated left pelvic sidewall fluid collection/cystic lesion measuring 2.3cm x 1.4cm x 6cm. 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 She presents today to discuss options for systemic therapy.   She reported her breathing has improved, remains on lasix. She denies fevers, chills, CP, nausea, vomiting, diarrhea. She does report a poor appetite and early satiety. She has lost 7lb since discharge from the hospital. Her LMP was 4 years ago. Denies any recent vaginal bleeding. She has not seen a gynecologist in many years. She has had 2 prior pregnancies and 2 live births, age at first birth was 23.    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 Splenic flexure mobilization, infra gastric omentectomy, resection of falsiform ligament, appendectomy, cystotomy repair, cytoreductive surgery HIPEC- 2/6/24-  Bilateral ovaries with high grade mullerian carcinoma and tubo-ovarian adhesions Appendix with metastatic carcinoma,  Uterine serosa with adhesion to bowel and metastatic carcinoma One identified fallopian tube with metastatic carcinoma Favored to be high grade serous carcinoma.  ypT3Nx  Genetics: [employer] panel 10/16/23- Negative, no pathogenic mutations or VUS    HCM: - Colonoscopy: never done  - Gyn: never done  - Mammo: never done  - Lung cancer screen: never done (but had recent chest imaging)  - DEXA: never done    SH: - Occupation: works part time as a   - Living situation: lives in [place] w/ her , 2 children  - Smoking/etoh/illicits: former smoker, quit 2 months ago, denies etoh  - Exercise: not very physically active at this time   FH: - Her father had prostate cancer  [de-identified] : Antonella presents today for follow up on 7/22/24 for peritoneal carcinomatosis.  Started Niraparib 200 mg at hs on 7/19/24  Here today with her daughter who translates per her request.  Started Niraparib 3 days ago.  Notes occasional facial flushing and skin changes "feels prickly" - no discrete rash or itching.  Also with mild constipation and nausea.  Previously noted "lump" in her left leg has essentially resolved - duplex US was negative. Denies recent headaches, visual changes, balance issues, CP, cough, SOB, n/v/d, constipation, unintentional weight loss or new bone or back pain. Reports grade I neuropathy in her toes, has no issues walking.   Genetics: [employer] panel 10/16/23- Negative, no pathogenic mutations or VUS Foundation testing 10/6/23- TPS 0%, TC 0%, PDL1 0 IZAIAH score 24.5%, HRD Positive- likely benefit from maintenance niraparib MS-Stable MTAP loss, NF1 mut, TP53 mut, CDKN2A/B loss.  Labs today reviewed and c/f:  WBC 8.09, hgb 9.7, plt 312

## 2024-07-23 DIAGNOSIS — C56.9 MALIGNANT NEOPLASM OF UNSPECIFIED OVARY: ICD-10-CM

## 2024-07-23 LAB
ALBUMIN SERPL ELPH-MCNC: 4.6 G/DL
ALP BLD-CCNC: 114 U/L
ALT SERPL-CCNC: 18 U/L
ANION GAP SERPL CALC-SCNC: 15 MMOL/L
AST SERPL-CCNC: 20 U/L
BILIRUB SERPL-MCNC: 0.3 MG/DL
BUN SERPL-MCNC: 27 MG/DL
CALCIUM SERPL-MCNC: 9.3 MG/DL
CANCER AG125 SERPL-ACNC: 322 U/ML
CHLORIDE SERPL-SCNC: 99 MMOL/L
CO2 SERPL-SCNC: 26 MMOL/L
CREAT SERPL-MCNC: 1.65 MG/DL
EGFR: 36 ML/MIN/1.73M2
GLUCOSE SERPL-MCNC: 97 MG/DL
MAGNESIUM SERPL-MCNC: 1.8 MG/DL
PHOSPHATE SERPL-MCNC: 4.6 MG/DL
POTASSIUM SERPL-SCNC: 3.7 MMOL/L
PROT SERPL-MCNC: 7.6 G/DL
SODIUM SERPL-SCNC: 140 MMOL/L

## 2024-07-25 ENCOUNTER — APPOINTMENT (OUTPATIENT)
Dept: HEMATOLOGY ONCOLOGY | Facility: CLINIC | Age: 59
End: 2024-07-25

## 2024-07-29 LAB
ALBUMIN SERPL ELPH-MCNC: 4.4 G/DL
ALP BLD-CCNC: 120 U/L
ALT SERPL-CCNC: 12 U/L
ANION GAP SERPL CALC-SCNC: 14 MMOL/L
AST SERPL-CCNC: 17 U/L
BILIRUB SERPL-MCNC: 0.4 MG/DL
BUN SERPL-MCNC: 23 MG/DL
CALCIUM SERPL-MCNC: 9.4 MG/DL
CHLORIDE SERPL-SCNC: 99 MMOL/L
CO2 SERPL-SCNC: 24 MMOL/L
CREAT SERPL-MCNC: 1.24 MG/DL
EGFR: 51 ML/MIN/1.73M2
GLUCOSE SERPL-MCNC: 95 MG/DL
POTASSIUM SERPL-SCNC: 3.9 MMOL/L
PROT SERPL-MCNC: 7.3 G/DL
SODIUM SERPL-SCNC: 137 MMOL/L

## 2024-08-08 ENCOUNTER — APPOINTMENT (OUTPATIENT)
Dept: NUCLEAR MEDICINE | Facility: CLINIC | Age: 59
End: 2024-08-08

## 2024-08-08 PROCEDURE — 78815 PET IMAGE W/CT SKULL-THIGH: CPT | Mod: PS

## 2024-08-08 PROCEDURE — A9552: CPT

## 2024-08-15 ENCOUNTER — RESULT REVIEW (OUTPATIENT)
Age: 59
End: 2024-08-15

## 2024-08-15 ENCOUNTER — APPOINTMENT (OUTPATIENT)
Dept: HEMATOLOGY ONCOLOGY | Facility: CLINIC | Age: 59
End: 2024-08-15
Payer: COMMERCIAL

## 2024-08-15 ENCOUNTER — NON-APPOINTMENT (OUTPATIENT)
Age: 59
End: 2024-08-15

## 2024-08-15 ENCOUNTER — APPOINTMENT (OUTPATIENT)
Dept: INFUSION THERAPY | Facility: CANCER CENTER | Age: 59
End: 2024-08-15

## 2024-08-15 VITALS
BODY MASS INDEX: 31.71 KG/M2 | HEART RATE: 104 BPM | HEIGHT: 63 IN | WEIGHT: 179 LBS | OXYGEN SATURATION: 97 % | SYSTOLIC BLOOD PRESSURE: 128 MMHG | DIASTOLIC BLOOD PRESSURE: 83 MMHG | TEMPERATURE: 97.4 F

## 2024-08-15 DIAGNOSIS — C56.9 MALIGNANT NEOPLASM OF UNSPECIFIED OVARY: ICD-10-CM

## 2024-08-15 LAB
BASOPHILS # BLD AUTO: 0.07 K/UL — SIGNIFICANT CHANGE UP (ref 0–0.2)
BASOPHILS NFR BLD AUTO: 0.7 % — SIGNIFICANT CHANGE UP (ref 0–2)
EOSINOPHIL # BLD AUTO: 0.13 K/UL — SIGNIFICANT CHANGE UP (ref 0–0.5)
EOSINOPHIL NFR BLD AUTO: 1.4 % — SIGNIFICANT CHANGE UP (ref 0–6)
HCT VFR BLD CALC: 28.5 % — LOW (ref 34.5–45)
HGB BLD-MCNC: 9.4 G/DL — LOW (ref 11.5–15.5)
IMM GRANULOCYTES NFR BLD AUTO: 0.4 % — SIGNIFICANT CHANGE UP (ref 0–0.9)
LYMPHOCYTES # BLD AUTO: 2.56 K/UL — SIGNIFICANT CHANGE UP (ref 1–3.3)
LYMPHOCYTES # BLD AUTO: 26.9 % — SIGNIFICANT CHANGE UP (ref 13–44)
MCHC RBC-ENTMCNC: 31.9 PG — SIGNIFICANT CHANGE UP (ref 27–34)
MCHC RBC-ENTMCNC: 33 GM/DL — SIGNIFICANT CHANGE UP (ref 32–36)
MCV RBC AUTO: 96.6 FL — SIGNIFICANT CHANGE UP (ref 80–100)
MONOCYTES # BLD AUTO: 0.85 K/UL — SIGNIFICANT CHANGE UP (ref 0–0.9)
MONOCYTES NFR BLD AUTO: 8.9 % — SIGNIFICANT CHANGE UP (ref 2–14)
NEUTROPHILS # BLD AUTO: 5.88 K/UL — SIGNIFICANT CHANGE UP (ref 1.8–7.4)
NEUTROPHILS NFR BLD AUTO: 61.7 % — SIGNIFICANT CHANGE UP (ref 43–77)
NRBC # BLD: 0 /100 WBCS — SIGNIFICANT CHANGE UP (ref 0–0)
PLATELET # BLD AUTO: 242 K/UL — SIGNIFICANT CHANGE UP (ref 150–400)
RBC # BLD: 2.95 M/UL — LOW (ref 3.8–5.2)
RBC # FLD: 14.4 % — SIGNIFICANT CHANGE UP (ref 10.3–14.5)
WBC # BLD: 9.53 K/UL — SIGNIFICANT CHANGE UP (ref 3.8–10.5)
WBC # FLD AUTO: 9.53 K/UL — SIGNIFICANT CHANGE UP (ref 3.8–10.5)

## 2024-08-15 PROCEDURE — G2211 COMPLEX E/M VISIT ADD ON: CPT

## 2024-08-15 PROCEDURE — 84100 ASSAY OF PHOSPHORUS: CPT

## 2024-08-15 PROCEDURE — 96523 IRRIG DRUG DELIVERY DEVICE: CPT

## 2024-08-15 PROCEDURE — 99215 OFFICE O/P EST HI 40 MIN: CPT

## 2024-08-15 PROCEDURE — 86304 IMMUNOASSAY TUMOR CA 125: CPT

## 2024-08-15 PROCEDURE — 83735 ASSAY OF MAGNESIUM: CPT

## 2024-08-15 PROCEDURE — 80053 COMPREHEN METABOLIC PANEL: CPT

## 2024-08-15 PROCEDURE — 85027 COMPLETE CBC AUTOMATED: CPT

## 2024-08-15 NOTE — HISTORY OF PRESENT ILLNESS
[de-identified] : Referred by: hospital follow up  Gosia (daughter) [phone], Rosalina (lives in [place])  Her daughter translates per patient preference   Cancer Summary:  DIAGNOSIS: peritoneal carcinomatosis  PROCEDURE AND DATE: Abdominal lesion core biopsy 10/6/23 PATHOLOGY: metastatic carcinoma, the IHC profile is compatible w/ mullerian primary  cytoreductive surgery w/ HIPEC on 2/6/24 with Dr. Miroslava Perez which revealed high grade serous ovarian cancer, pT3Nx.  STAGE: IV Chemotherapy:  C1 Carbo/taxol 11/2/23 C2 Carbo/taxol/blas 11/24/23 (reacted to Taxol) C3 carbo/abraxane/blas 12/14/23 C4 carbo/abraxane/blas 1/4/24 C5 carbo/abraxane - 4/19/24 C6 carbo/abraxane - 5/13/24 C7 carbo/abraxane - 6/3/24 Radiation: N/A Hormonal: N/A STATUS: active, on chemotherapy  Genetics STATUS:  Foundation testing 10/6/23- TPS 0%, TC 0%, PDL1 0 IZAIAH score 24.5%, HRD Positive- likely benefit from maintenance niraparib  MS-Stable MTAP loss, NF1 mut, TP53 mut, CDKN2A/B loss  Ms. Mckeon presented at age 58 in October 2023 for evaluation of advanced ovarian cancer, peritoneal carcinomatosis. The patient has a medical history of HTN, HLD.    Antonella is here today with her 2 daughters (one of whom is in from [place]). She initially presented to [facility] in July 2023 with SOB, difficulty breathing- found to have a pleural effusion which was attributed to cardiac etiology, s/p thoracentesis. She generaly does not follow with health care providers and was not up to date on any health care maintenance. She was then readmitted to [facility] from 10/1/23 to 10/8/23 for SOB/JACKSON, workup revealed peritoneal carcinomatosis with ascites as well as right sided pleural effusion. She was evaluated by Dr. Karolina Rodriguez and underwent repeat imaging w/ abdomen pelvis CAP w/ contrast on 10/3/23- revealed diffuse peritoneal caricnomatosis, no cystic ovarian lesion, loculated left pelvic sidewall fluid collection/cystic lesion measuring 2.3cm x 1.4cm x 6cm. 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 She presents today to discuss options for systemic therapy.   At today's visit she reports her breathing has improved, remains on lasix. She denies fevers, chills, CP, nausea, vomiting, diarrhea. She does report a poor appetite and early satiety. She has lost 7lb since discharge from the hospital. Her LMP was 4 years ago. Denies any recent vaginal bleeding. She has not seen a gynecologist in many years. She has had 2 prior pregnancies and 2 live births, age at first birth was 23.    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 Splenic flexure mobilization, infra gastric omentectomy, resection of falsiform ligament, appendectomy, cystotomy repair, cytoreductive surgery HIPEC- 2/6/24-  Bilateral ovaries with high grade mullerian carcinoma and tubo-ovarian adhesions Appendix with metastatic carcinoma,  Uterine serosa with adhesion to bowel and metastatic carcinoma One identified fallopian tube with metastatic carcinoma Favored to be high grade serous carcinoma.  ypT3Nx  Genetics: [employer] panel 10/16/23- Negative, no pathogenic mutations or VUS    HCM: - Colonoscopy: never done  - Gyn: never done  - Mammo: never done  - Lung cancer screen: never done (but had recent chest imaging)  - DEXA: never done    SH: - Occupation: works part time as a   - Living situation: lives in [place] w/ her , 2 children  - Smoking/etoh/illicits: former smoker, quit 2 months ago, denies etoh  - Exercise: not very physically active at this time   FH: - Her father had prostate cancer  [de-identified] : Antonella presents today for follow up on 8/15/24 for peritoneal carcinomatosis.  Her daughter Gosia translates per patient preference.   At today's visit she reports she is generally not feeling well- reports nausea, vomiting, poor appetite, and stomach pain ever since starting zejula 200mg a few weeks ago. She subsequently discontinued zejula a few days ago and is feeling a bit better. She feels full easily and has lost 8-9lb since the last visit. Ca-125 increased from 47 to 322 on 7/22/24. PET/CT 8/8/24 unfortunately reveals progression of disease- Findings suspicious for recurrent peritoneal carcinomatosis. Increasing RIGHT LOWER lobe pleural-based changes with increased uptake, suspicious for malignant involvement. New hypermetabolic subcarinal node and LEFT para-aortic node.   Genetics: [employer] panel 10/16/23- Negative, no pathogenic mutations or VUS Foundation testing 10/6/23- TPS 0%, TC 0%, PDL1 0 IZAIAH score 24.5%, HRD Positive- likely benefit from maintenance niraparib MS-Stable MTAP loss, NF1 mut, TP53 mut, CDKN2A/B loss.

## 2024-08-15 NOTE — RESULTS/DATA
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   We discussed that management of stage IV ovarian cancer is palliative in nature and goals will be to prolong her life and improve her symptoms.  She will likely require systemic therapy prior to any debulking surgery.  We referred her to Dr. Miroslava Perez at [facility] from gynecologic oncology.  Will recommend carbo/taxo/bevacizumab for a total of 6 cycles at this time, followed by maintenance therapy likely with niraparib.   Recommend treatment w/ carboplatin AUC 5-6 IV q 21 days with taxol 175mg/m2 IV and avastin (bevacizumab) every 21 days for 6 cycles for treatment of stage IV ovarian cancer.  C1 chemotherapy w/ carbo/taxol initiated on 11/2/23. Taxol reaction at last visit with C2- Switched to carbo/abraxane/blas. She is now s/p cytoreductive surgery w/ HIPEC on 2/6/24 with Dr. Miroslava Perez which revealed high grade serous ovarian cancer, pT3Nx.  Post-op course c/b fistula and drain infection, remains on augmentin but is feeling much better.  She completed C7 carbo/abraxane on 6/3/24.   Briefly on niraparib but reported worsening of symptoms vs. medication side effect. Tumor markers rising.  PET/CT 8/8/24 unfortunately reveals progression of disease- recurrence peritoneal carcinomatosis and additional hypermetabolic LAD.  Completed chemotherapy only 2 months ago, c/f platinum resistant disease. Options for treatment include:  Doxil monotherapy, Doxil + blas, gemcitabine monotherapy, gem/blas, elahere for FRalpha + tumors Will RTC in 3-4 weeks to initiate next treatment. Pending results of FRalpha testing. Will tentatively schedule for doxil.  I have also explored clinical trials within our institution. She is not a candidate at this time.  Consider phase I trials in the future.   Foundation testing 10/6/23- TPS 0%, TC 0%, PDL1 0 IZAIAH score 24.5%, HRD Positive- likely benefit from maintenance niraparib  MS-Stable MTAP loss, NF1 mut, TP53 mut, CDKN2A/B loss My[employer] germline testing 10/16/23- negative for pathogenic mutations or VUS   All patient questions answered at today's visit. Patient urged to call the office with any questions or concerns.   I personally have spent a total of 45 minutes of time on the date of this encounter reviewing test results, documenting findings, coordinating care and directly consulting with the patient and/or designated family member. Greater than 50% of the face to face encounter time was spent on counseling and/or coordination of care for high grade serous ovarian cancer.

## 2024-08-15 NOTE — PHYSICAL EXAM
[Restricted in physically strenuous activity but ambulatory and able to carry out work of a light or sedentary nature] : Status 1- Restricted in physically strenuous activity but ambulatory and able to carry out work of a light or sedentary nature, e.g., light house work, office work [Normal] : affect appropriate [de-identified] : generally well appearing female, NAD, pleasant  [de-identified] : vertical incision, well healed, ostomy in place, mildly tender today

## 2024-08-15 NOTE — PHYSICAL EXAM
[Restricted in physically strenuous activity but ambulatory and able to carry out work of a light or sedentary nature] : Status 1- Restricted in physically strenuous activity but ambulatory and able to carry out work of a light or sedentary nature, e.g., light house work, office work [Normal] : affect appropriate [de-identified] : generally well appearing female, NAD, pleasant  [de-identified] : vertical incision, well healed, ostomy in place, mildly tender today

## 2024-08-15 NOTE — RESULTS/DATA
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   We discussed that management of stage IV ovarian cancer is palliative in nature and goals will be to prolong her life and improve her symptoms.  She will likely require systemic therapy prior to any debulking surgery.  We referred her to Dr. Miroslava Perez at [facility] from gynecologic oncology.  Will recommend carbo/taxo/bevacizumab for a total of 6 cycles at this time, followed by maintenance therapy likely with niraparib.   Recommend treatment w/ carboplatin AUC 5-6 IV q 21 days with taxol 175mg/m2 IV and avastin (bevacizumab) every 21 days for 6 cycles for treatment of stage IV ovarian cancer.  C1 chemotherapy w/ carbo/taxol initiated on 11/2/23. Taxol reaction at last visit with C2- Switched to carbo/abraxane/blas. She is now s/p cytoreductive surgery w/ HIPEC on 2/6/24 with Dr. Miroslava Perez which revealed high grade serous ovarian cancer, pT3Nx.  Post-op course c/b fistula and drain infection, remains on augmentin but is feeling much better.  She completed C7 carbo/abraxane on 6/3/24.   Briefly on niraparib but reported worsening of symptoms vs. medication side effect. Tumor markers rising.  PET/CT 8/8/24 unfortunately reveals progression of disease- recurrence peritoneal carcinomatosis and additional hypermetabolic LAD.  Completed chemotherapy only 2 months ago, c/f platinum resistant disease. Options for treatment include:  Doxil monotherapy, Doxil + blas, gemcitabine monotherapy, gem/blas, elahere for FRalpha + tumors Will RTC in 3-4 weeks to initiate next treatment. Pending results of FRalpha testing. Will tentatively schedule for doxil.  I have also explored clinical trials within our institution. She is not a candidate at this time.  Consider phase I trials in the future.   Foundation testing 10/6/23- TPS 0%, TC 0%, PDL1 0 IZAIAH score 24.5%, HRD Positive- likely benefit from maintenance niraparib  MS-Stable MTAP loss, NF1 mut, TP53 mut, CDKN2A/B loss My[employer] germline testing 10/16/23- negative for pathogenic mutations or VUS   All patient questions answered at today's visit. Patient urged to call the office with any questions or concerns.   I personally have spent a total of 45 minutes of time on the date of this encounter reviewing test results, documenting findings, coordinating care and directly consulting with the patient and/or designated family member. Greater than 50% of the face to face encounter time was spent on counseling and/or coordination of care for high grade serous ovarian cancer.

## 2024-08-15 NOTE — HISTORY OF PRESENT ILLNESS
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 Miroslava Perez which revealed high grade serous ovarian cancer, pT3Nx.  STAGE: IV Chemotherapy:  C1 Carbo/taxol 11/2/23 C2 Carbo/taxol/blas 11/24/23 (reacted to Taxol) C3 carbo/abraxane/blas 12/14/23 C4 carbo/abraxane/blas 1/4/24 C5 carbo/abraxane - 4/19/24 C6 carbo/abraxane - 5/13/24 C7 carbo/abraxane - 6/3/24 Radiation: N/A Hormonal: N/A STATUS: active, on chemotherapy  Genetics STATUS:  Foundation testing 10/6/23- TPS 0%, TC 0%, PDL1 0 IZAIAH score 24.5%, HRD Positive- likely benefit from maintenance niraparib  MS-Stable MTAP loss, NF1 mut, TP53 mut, CDKN2A/B loss  Ms. Mckeon presented at age 58 in October 2023 for evaluation of advanced ovarian cancer, peritoneal carcinomatosis. The patient has a medical history of HTN, HLD.    Antonella is here today with her 2 daughters (one of whom is in from [place]). She [place] [facility] [place] with SOB, difficulty breathing- found to have a pleural effusion which was attributed to cardiac etiology, s/p thoracentesis. She generaly does not follow with health care providers and was not up to date on any health care maintenance. She was then readmitted to [facility] [place]1/23 to 10/8/23 for SOB/JACKSON, workup revealed peritoneal carcinomatosis with ascites as well as right sided pleural effusion. She was evaluated by Dr. Karolina Rodriguez and underwent repeat imaging w/ abdomen pelvis CAP w/ contrast on 10/3/23- revealed diffuse peritoneal caricnomatosis, no cystic ovarian lesion, loculated left pelvic sidewall fluid collection/cystic lesion measuring 2.3cm x 1.4cm x 6cm. 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 She presents today to discuss options for systemic therapy.   At today's visit she reports her breathing has improved, remains on lasix. She denies fevers, chills, CP, nausea, vomiting, diarrhea. She does report a poor appetite and early satiety. She has lost 7lb since discharge from the hospital. Her LMP was 4 years ago. Denies any recent vaginal bleeding. She has not seen a gynecologist in many years. She has had 2 prior pregnancies and 2 live births, age at first birth was 23.    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 Splenic flexure mobilization, infra gastric omentectomy, resection of falsiform ligament, appendectomy, cystotomy repair, cytoreductive surgery HIPEC- 2/6/24-  Bilateral ovaries with high grade mullerian carcinoma and tubo-ovarian adhesions Appendix with metastatic carcinoma,  Uterine serosa with adhesion to bowel and metastatic carcinoma One identified fallopian tube with metastatic carcinoma Favored to be high grade serous carcinoma.  ypT3Nx  Genetics: Compression Kinetics panel 10/16/23- Negative, no pathogenic mutations or VUS    HCM: - Colonoscopy: never done  - Gyn: never done  - Mammo: never done  - Lung cancer screen: never done (but had recent chest imaging)  - DEXA: never done    SH: - Occupation: works part time as a   - Living situation: lives in [place] w/ her , 2 children  - Smoking/etoh/illicits: former smoker, quit 2 months ago, denies etoh  - Exercise: not very physically active at this time   FH: - Her father had prostate cancer  [de-identified] : Antonella presents today for follow up on 8/15/24 for peritoneal carcinomatosis.  Her daughter Gosia translates per patient preference.   At today's visit she reports she is generally not feeling well- reports nausea, vomiting, poor appetite, and stomach pain ever since starting zejula 200mg a few weeks ago. She subsequently discontinued zejula a few days ago and is feeling a bit better. She feels full easily and has lost 8-9lb since the last visit. Ca-125 increased from 47 to 322 on 7/22/24. PET/CT 8/8/24 unfortunately reveals progression of disease- Findings suspicious for recurrent peritoneal carcinomatosis. Increasing RIGHT LOWER lobe pleural-based changes with increased uptake, suspicious for malignant involvement. New hypermetabolic subcarinal node and LEFT para-aortic node.   Genetics: Total-trax panel 10/16/23- Negative, no pathogenic mutations or VUS Foundation testing 10/6/23- TPS 0%, TC 0%, PDL1 0 IZAIAH score 24.5%, HRD Positive- likely benefit from maintenance niraparib MS-Stable MTAP loss, NF1 mut, TP53 mut, CDKN2A/B loss.

## 2024-08-16 LAB
ALBUMIN SERPL ELPH-MCNC: 4.1 G/DL — SIGNIFICANT CHANGE UP (ref 3.3–5)
ALP SERPL-CCNC: 126 U/L — HIGH (ref 40–120)
ALT FLD-CCNC: 16 U/L — SIGNIFICANT CHANGE UP (ref 10–45)
ANION GAP SERPL CALC-SCNC: 18 MMOL/L — HIGH (ref 5–17)
AST SERPL-CCNC: 20 U/L — SIGNIFICANT CHANGE UP (ref 10–40)
BILIRUB SERPL-MCNC: 0.3 MG/DL — SIGNIFICANT CHANGE UP (ref 0.2–1.2)
BUN SERPL-MCNC: 28 MG/DL — HIGH (ref 7–23)
CALCIUM SERPL-MCNC: 9.1 MG/DL — SIGNIFICANT CHANGE UP (ref 8.4–10.5)
CANCER AG125 SERPL-ACNC: 691 U/ML — HIGH
CHLORIDE SERPL-SCNC: 96 MMOL/L — SIGNIFICANT CHANGE UP (ref 96–108)
CO2 SERPL-SCNC: 24 MMOL/L — SIGNIFICANT CHANGE UP (ref 22–31)
CREAT SERPL-MCNC: 1.54 MG/DL — HIGH (ref 0.5–1.3)
EGFR: 39 ML/MIN/1.73M2 — LOW
GLUCOSE SERPL-MCNC: 106 MG/DL — HIGH (ref 70–99)
MAGNESIUM SERPL-MCNC: 1.4 MG/DL — LOW (ref 1.6–2.6)
PHOSPHATE SERPL-MCNC: 3.3 MG/DL — SIGNIFICANT CHANGE UP (ref 2.5–4.5)
POTASSIUM SERPL-MCNC: 3.5 MMOL/L — SIGNIFICANT CHANGE UP (ref 3.5–5.3)
POTASSIUM SERPL-SCNC: 3.5 MMOL/L — SIGNIFICANT CHANGE UP (ref 3.5–5.3)
PROT SERPL-MCNC: 7.1 G/DL — SIGNIFICANT CHANGE UP (ref 6–8.3)
SODIUM SERPL-SCNC: 138 MMOL/L — SIGNIFICANT CHANGE UP (ref 135–145)

## 2024-08-20 ENCOUNTER — RESULT REVIEW (OUTPATIENT)
Age: 59
End: 2024-08-20

## 2024-08-23 ENCOUNTER — OUTPATIENT (OUTPATIENT)
Dept: OUTPATIENT SERVICES | Facility: HOSPITAL | Age: 59
LOS: 1 days | End: 2024-08-23
Payer: MEDICAID

## 2024-08-23 DIAGNOSIS — C56.9 MALIGNANT NEOPLASM OF UNSPECIFIED OVARY: ICD-10-CM

## 2024-08-23 DIAGNOSIS — Z98.890 OTHER SPECIFIED POSTPROCEDURAL STATES: Chronic | ICD-10-CM

## 2024-08-26 ENCOUNTER — LABORATORY RESULT (OUTPATIENT)
Age: 59
End: 2024-08-26

## 2024-08-26 ENCOUNTER — APPOINTMENT (OUTPATIENT)
Dept: HEMATOLOGY ONCOLOGY | Facility: CLINIC | Age: 59
End: 2024-08-26
Payer: COMMERCIAL

## 2024-08-26 ENCOUNTER — RESULT REVIEW (OUTPATIENT)
Age: 59
End: 2024-08-26

## 2024-08-26 VITALS
HEART RATE: 121 BPM | WEIGHT: 168 LBS | SYSTOLIC BLOOD PRESSURE: 108 MMHG | DIASTOLIC BLOOD PRESSURE: 77 MMHG | HEIGHT: 63 IN | TEMPERATURE: 98.5 F | BODY MASS INDEX: 29.77 KG/M2 | OXYGEN SATURATION: 97 %

## 2024-08-26 DIAGNOSIS — C56.9 MALIGNANT NEOPLASM OF UNSPECIFIED OVARY: ICD-10-CM

## 2024-08-26 LAB
BASOPHILS # BLD AUTO: 0.06 K/UL — SIGNIFICANT CHANGE UP (ref 0–0.2)
BASOPHILS NFR BLD AUTO: 0.6 % — SIGNIFICANT CHANGE UP (ref 0–2)
EOSINOPHIL # BLD AUTO: 0.02 K/UL — SIGNIFICANT CHANGE UP (ref 0–0.5)
EOSINOPHIL NFR BLD AUTO: 0.2 % — SIGNIFICANT CHANGE UP (ref 0–6)
HCT VFR BLD CALC: 33.3 % — LOW (ref 34.5–45)
HGB BLD-MCNC: 11.2 G/DL — LOW (ref 11.5–15.5)
IMM GRANULOCYTES NFR BLD AUTO: 0.3 % — SIGNIFICANT CHANGE UP (ref 0–0.9)
LYMPHOCYTES # BLD AUTO: 2.45 K/UL — SIGNIFICANT CHANGE UP (ref 1–3.3)
LYMPHOCYTES # BLD AUTO: 25.5 % — SIGNIFICANT CHANGE UP (ref 13–44)
MCHC RBC-ENTMCNC: 32 PG — SIGNIFICANT CHANGE UP (ref 27–34)
MCHC RBC-ENTMCNC: 33.6 GM/DL — SIGNIFICANT CHANGE UP (ref 32–36)
MCV RBC AUTO: 95.1 FL — SIGNIFICANT CHANGE UP (ref 80–100)
MONOCYTES # BLD AUTO: 1.28 K/UL — HIGH (ref 0–0.9)
MONOCYTES NFR BLD AUTO: 13.3 % — SIGNIFICANT CHANGE UP (ref 2–14)
NEUTROPHILS # BLD AUTO: 5.77 K/UL — SIGNIFICANT CHANGE UP (ref 1.8–7.4)
NEUTROPHILS NFR BLD AUTO: 60.1 % — SIGNIFICANT CHANGE UP (ref 43–77)
NRBC # BLD: 0 /100 WBCS — SIGNIFICANT CHANGE UP (ref 0–0)
PLATELET # BLD AUTO: 389 K/UL — SIGNIFICANT CHANGE UP (ref 150–400)
RBC # BLD: 3.5 M/UL — LOW (ref 3.8–5.2)
RBC # FLD: 14.4 % — SIGNIFICANT CHANGE UP (ref 10.3–14.5)
WBC # BLD: 9.61 K/UL — SIGNIFICANT CHANGE UP (ref 3.8–10.5)
WBC # FLD AUTO: 9.61 K/UL — SIGNIFICANT CHANGE UP (ref 3.8–10.5)

## 2024-08-26 PROCEDURE — 99215 OFFICE O/P EST HI 40 MIN: CPT

## 2024-08-26 PROCEDURE — G2211 COMPLEX E/M VISIT ADD ON: CPT

## 2024-08-27 ENCOUNTER — NON-APPOINTMENT (OUTPATIENT)
Age: 59
End: 2024-08-27

## 2024-08-27 ENCOUNTER — APPOINTMENT (OUTPATIENT)
Dept: INFUSION THERAPY | Facility: CANCER CENTER | Age: 59
End: 2024-08-27

## 2024-08-27 NOTE — PHYSICAL EXAM
[Restricted in physically strenuous activity but ambulatory and able to carry out work of a light or sedentary nature] : Status 1- Restricted in physically strenuous activity but ambulatory and able to carry out work of a light or sedentary nature, e.g., light house work, office work [Normal] : affect appropriate [de-identified] : generally well appearing female, NAD, pleasant  [de-identified] : vertical incision, well healed, ostomy in place (loose stool), mildly tender today

## 2024-08-27 NOTE — PHYSICAL EXAM
[Restricted in physically strenuous activity but ambulatory and able to carry out work of a light or sedentary nature] : Status 1- Restricted in physically strenuous activity but ambulatory and able to carry out work of a light or sedentary nature, e.g., light house work, office work [Normal] : affect appropriate [de-identified] : generally well appearing female, NAD, pleasant  [de-identified] : vertical incision, well healed, ostomy in place (loose stool), mildly tender today

## 2024-08-27 NOTE — RESULTS/DATA
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   We discussed that management of stage IV ovarian cancer is palliative in nature and goals will be to prolong her life and improve her symptoms.  She will likely require systemic therapy prior to any debulking surgery.  We referred her to Dr. Miroslava Perez at [facility] from gynecologic oncology.  Will recommend carbo/taxo/bevacizumab for a total of 6 cycles at this time, followed by maintenance therapy likely with niraparib.   Recommend treatment w/ carboplatin AUC 5-6 IV q 21 days with taxol 175mg/m2 IV and avastin (bevacizumab) every 21 days for 6 cycles for treatment of stage IV ovarian cancer.  C1 chemotherapy w/ carbo/taxol initiated on 11/2/23. Taxol reaction at last visit with C2- Switched to carbo/abraxane/blas. She is now s/p cytoreductive surgery w/ HIPEC on 2/6/24 with Dr. Miroslava Perez which revealed high grade serous ovarian cancer, pT3Nx.  Post-op course c/b fistula and drain infection, remains on augmentin but is feeling much better.  She completed C7 carbo/abraxane on 6/3/24.   Briefly on niraparib but reported worsening of symptoms vs. medication side effect. Tumor markers rising.  PET/CT 8/8/24 unfortunately revealed progression of disease- recurrence peritoneal carcinomatosis and additional hypermetabolic LAD.  Completed chemotherapy only 2 months prior to progression c/f platinum resistant disease.  Options for treatment discussed including: Doxil monotherapy, Doxil + blas, gemcitabine monotherapy, gem/blas, elahere for FRalpha + tumors We also explored clinical trials within our institution. She is not a candidate at this time.  Consider phase I trials in the future  We discussed recent PET/CT results again at length today FOLR-1 testing resulted positive; discussed at length w/ pt, teaching completed/consent signed, will plan for Elahere Pt/daughter understand the need to see ophthalmology prior to treatment onset Recently admitted for small bowel obstruction At visit today reports persistent nausea + diarrhea; unable to proceed w/ IV hydration d/t scheduling issues Labs pending; hoping to proceed with supplemental IV hydration tomorrow Encouraged aggressive management of nausea/diarrhea Discussed need to seek emergent care if symptoms exacerbate/fail to improve Next office visit with Dr. Prieto or JOSE Bustillos NP w/ C2  [facility] testing 10/6/23- TPS 0%, TC 0%, PDL1 0 IZAIAH score 24.5%, HRD Positive- likely benefit from maintenance niraparib  MS-Stable MTAP loss, NF1 mut, TP53 mut, CDKN2A/B loss FOLR- 1 positive [place] germline testing 10/16/23- negative for pathogenic mutations or VUS   All patient questions answered at today's visit. Patient urged to call the office with any questions or concerns.   I personally have spent a total of 45 minutes of time on the date of this encounter reviewing test results, documenting findings, coordinating care and directly consulting with the patient and/or designated family member. Greater than 50% of the face to face encounter time was spent on counseling and/or coordination of care for high grade serous ovarian cancer.

## 2024-08-27 NOTE — HISTORY OF PRESENT ILLNESS
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 Miroslava Perez which revealed high grade serous ovarian cancer, pT3Nx.  STAGE: IV Chemotherapy:  C1 Carbo/taxol 11/2/23 C2 Carbo/taxol/blas 11/24/23 (reacted to Taxol) C3 carbo/abraxane/blas 12/14/23 C4 carbo/abraxane/blas 1/4/24 C5 carbo/abraxane - 4/19/24 C6 carbo/abraxane - 5/13/24 C7 carbo/abraxane - 6/3/24 Radiation: N/A Hormonal: N/A STATUS: active, on chemotherapy  Genetics STATUS:  Foundation testing 10/6/23- TPS 0%, TC 0%, PDL1 0 IZAIAH score 24.5%, HRD Positive- likely benefit from maintenance niraparib  MS-Stable MTAP loss, NF1 mut, TP53 mut, CDKN2A/B loss  Ms. Mckeon presented at age 58 in October 2023 for evaluation of advanced ovarian cancer, peritoneal carcinomatosis. The patient has a medical history of HTN, HLD.    Antonella presented with her 2 daughters (one of whom is in from [place]). [place] [facility] [place]B, difficulty breathing- found to have a pleural effusion which was attributed to cardiac etiology, s/p thoracentesis. She generally does not follow with health care providers and was not up to date on any health care maintenance. [place] then [place] [facility] [place] for SOB/JACKSON, workup revealed peritoneal carcinomatosis with ascites as well as right sided pleural effusion. She was evaluated by Dr. Karolina Rodriguez and underwent repeat imaging w/ abdomen pelvis CAP w/ contrast on 10/3/23- revealed diffuse peritoneal caricnomatosis, no cystic ovarian lesion, loculated left pelvic sidewall fluid collection/cystic lesion measuring 2.3cm x 1.4cm x 6cm. 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 She presents today to discuss options for systemic therapy.   She reported her breathing had improved, remains on lasix. She denied fevers, chills, CP, nausea, vomiting, diarrhea. She does report a poor appetite and early satiety. She has lost 7lb since discharge from the hospital. Her LMP was 4 years ago. Denied any recent vaginal bleeding. She has not seen a gynecologist in many years. She has had 2 prior pregnancies and 2 live births, age at first birth was 23.    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 Splenic flexure mobilization, infra gastric omentectomy, resection of falsiform ligament, appendectomy, cystotomy repair, cytoreductive surgery HIPEC- 2/6/24-  Bilateral ovaries with high grade mullerian carcinoma and tubo-ovarian adhesions Appendix with metastatic carcinoma,  Uterine serosa with adhesion to bowel and metastatic carcinoma One identified fallopian tube with metastatic carcinoma Favored to be high grade serous carcinoma.  ypT3Nx  Genetics: WinView panel 10/16/23- Negative, no pathogenic mutations or VUS    HCM: - Colonoscopy: never done  - Gyn: never done  - Mammo: never done  - Lung cancer screen: never done (but had recent chest imaging)  - DEXA: never done    SH: - Occupation: works part time as a   - Living situation: lives in [place] w/ her , 2 children  - Smoking/etoh/illicits: former smoker, quit 2 months ago, denies etoh  - Exercise: not very physically active at this time   FH: - Her father had prostate cancer  [de-identified] : Antonella presents today for follow up on 8/26/24 for peritoneal carcinomatosis.  Her daughter Gosia translates per patient preference.   [place] [facility] [place] last week d/t small bowel obstruction  At today's visit she reports not feeling well - her first two days after discharge were good.  Now with increased nausea and diarrhea (through ostomy bag).  Has been trying to follow a liquid diet (vomited x5 this am).  Having minimal urinary output (no dysuria, frequency or hematuria although reports intermittent right flank pain).  Would like to proceed with supplemental IV fluids today.  Denies F/Cs, CP, SOB, cough, bleeding/bruising issues, or extremity edema.   Developed abdominal pain which improved after discontinuing Zejula. Ca-125 increased from 47 to 322 on 7/22/24. PET/CT 8/8/24 unfortunately reveals progression of disease- Findings suspicious for recurrent peritoneal carcinomatosis. Increasing RIGHT LOWER lobe pleural-based changes with increased uptake, suspicious for malignant involvement. New hypermetabolic subcarinal node and LEFT para-aortic node.   Genetics: eblizz panel 10/16/23- Negative, no pathogenic mutations or VUS Foundation testing 10/6/23- TPS 0%, TC 0%, PDL1 0 IZAIAH score 24.5%, HRD Positive- likely benefit from maintenance niraparib MS-Stable MTAP loss, NF1 mut, TP53 mut, CDKN2A/B loss. FOLR-1 testing returned positive

## 2024-08-27 NOTE — RESULTS/DATA
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   We discussed that management of stage IV ovarian cancer is palliative in nature and goals will be to prolong her life and improve her symptoms.  She will likely require systemic therapy prior to any debulking surgery.  We referred her to Dr. Miroslava Perez at [facility] from gynecologic oncology.  Will recommend carbo/taxo/bevacizumab for a total of 6 cycles at this time, followed by maintenance therapy likely with niraparib.   Recommend treatment w/ carboplatin AUC 5-6 IV q 21 days with taxol 175mg/m2 IV and avastin (bevacizumab) every 21 days for 6 cycles for treatment of stage IV ovarian cancer.  C1 chemotherapy w/ carbo/taxol initiated on 11/2/23. Taxol reaction at last visit with C2- Switched to carbo/abraxane/blas. She is now s/p cytoreductive surgery w/ HIPEC on 2/6/24 with Dr. Miroslava Perez which revealed high grade serous ovarian cancer, pT3Nx.  Post-op course c/b fistula and drain infection, remains on augmentin but is feeling much better.  She completed C7 carbo/abraxane on 6/3/24.   Briefly on niraparib but reported worsening of symptoms vs. medication side effect. Tumor markers rising.  PET/CT 8/8/24 unfortunately revealed progression of disease- recurrence peritoneal carcinomatosis and additional hypermetabolic LAD.  Completed chemotherapy only 2 months prior to progression c/f platinum resistant disease.  Options for treatment discussed including: Doxil monotherapy, Doxil + blas, gemcitabine monotherapy, gem/blas, elahere for FRalpha + tumors We also explored clinical trials within our institution. She is not a candidate at this time.  Consider phase I trials in the future  We discussed recent PET/CT results again at length today FOLR-1 testing resulted positive; discussed at length w/ pt, teaching completed/consent signed, will plan for Elahere Pt/daughter understand the need to see ophthalmology prior to treatment onset Recently admitted for small bowel obstruction At visit today reports persistent nausea + diarrhea; unable to proceed w/ IV hydration d/t scheduling issues Labs pending; hoping to proceed with supplemental IV hydration tomorrow Encouraged aggressive management of nausea/diarrhea Discussed need to seek emergent care if symptoms exacerbate/fail to improve Next office visit with Dr. Prieto or JOSE Bustillos NP w/ C2  [facility] testing 10/6/23- TPS 0%, TC 0%, PDL1 0 IZAIAH score 24.5%, HRD Positive- likely benefit from maintenance niraparib  MS-Stable MTAP loss, NF1 mut, TP53 mut, CDKN2A/B loss FOLR- 1 positive [facility] germline testing 10/16/23- negative for pathogenic mutations or VUS   All patient questions answered at today's visit. Patient urged to call the office with any questions or concerns.   I personally have spent a total of 45 minutes of time on the date of this encounter reviewing test results, documenting findings, coordinating care and directly consulting with the patient and/or designated family member. Greater than 50% of the face to face encounter time was spent on counseling and/or coordination of care for high grade serous ovarian cancer.

## 2024-08-27 NOTE — HISTORY OF PRESENT ILLNESS
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 Miroslava Perez which revealed high grade serous ovarian cancer, pT3Nx.  STAGE: IV Chemotherapy:  C1 Carbo/taxol 11/2/23 C2 Carbo/taxol/blas 11/24/23 (reacted to Taxol) C3 carbo/abraxane/blas 12/14/23 C4 carbo/abraxane/blas 1/4/24 C5 carbo/abraxane - 4/19/24 C6 carbo/abraxane - 5/13/24 C7 carbo/abraxane - 6/3/24 Radiation: N/A Hormonal: N/A STATUS: active, on chemotherapy  Genetics STATUS:  Foundation testing 10/6/23- TPS 0%, TC 0%, PDL1 0 IZAIAH score 24.5%, HRD Positive- likely benefit from maintenance niraparib  MS-Stable MTAP loss, NF1 mut, TP53 mut, CDKN2A/B loss  Ms. Mckeon presented at age 58 in October 2023 for evaluation of advanced ovarian cancer, peritoneal carcinomatosis. The patient has a medical history of HTN, HLD.    Antonella presented with her 2 daughters (one of whom is in from [place]). She initially presented to [facility] in July 2023 with SOB, difficulty breathing- found to have a pleural effusion which was attributed to cardiac etiology, s/p thoracentesis. She generally does not follow with health care providers and was not up to date on any health care maintenance. She was then readmitted to [facility] from 10/1/23 to 10/8/23 for SOB/JACKSON, workup revealed peritoneal carcinomatosis with ascites as well as right sided pleural effusion. She was evaluated by Dr. Karolina Rodriguez and underwent repeat imaging w/ abdomen pelvis CAP w/ contrast on 10/3/23- revealed diffuse peritoneal caricnomatosis, no cystic ovarian lesion, loculated left pelvic sidewall fluid collection/cystic lesion measuring 2.3cm x 1.4cm x 6cm. 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 She presents today to discuss options for systemic therapy.   She reported her breathing had improved, remains on lasix. She denied fevers, chills, CP, nausea, vomiting, diarrhea. She does report a poor appetite and early satiety. She has lost 7lb since discharge from the hospital. Her LMP was 4 years ago. Denied any recent vaginal bleeding. She has not seen a gynecologist in many years. She has had 2 prior pregnancies and 2 live births, age at first birth was 23.    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 Splenic flexure mobilization, infra gastric omentectomy, resection of falsiform ligament, appendectomy, cystotomy repair, cytoreductive surgery HIPEC- 2/6/24-  Bilateral ovaries with high grade mullerian carcinoma and tubo-ovarian adhesions Appendix with metastatic carcinoma,  Uterine serosa with adhesion to bowel and metastatic carcinoma One identified fallopian tube with metastatic carcinoma Favored to be high grade serous carcinoma.  ypT3Nx  Genetics: Beneq panel 10/16/23- Negative, no pathogenic mutations or VUS    HCM: - Colonoscopy: never done  - Gyn: never done  - Mammo: never done  - Lung cancer screen: never done (but had recent chest imaging)  - DEXA: never done    SH: - Occupation: works part time as a   - Living situation: lives in [place] w/ her , 2 children  - Smoking/etoh/illicits: former smoker, quit 2 months ago, denies etoh  - Exercise: not very physically active at this time   FH: - Her father had prostate cancer  [de-identified] : Antonella presents today for follow up on 8/26/24 for peritoneal carcinomatosis.  Her daughter Gosia translates per patient preference.   Admitted to [facility] for several days last week d/t small bowel obstruction  At today's visit she reports not feeling well - her first two days after discharge were good.  Now with increased nausea and diarrhea (through ostomy bag).  Has been trying to follow a liquid diet (vomited x5 this am).  Having minimal urinary output (no dysuria, frequency or hematuria although reports intermittent right flank pain).  Would like to proceed with supplemental IV fluids today.  Denies F/Cs, CP, SOB, cough, bleeding/bruising issues, or extremity edema.   Developed abdominal pain which improved after discontinuing Zejula. Ca-125 increased from 47 to 322 on 7/22/24. PET/CT 8/8/24 unfortunately reveals progression of disease- Findings suspicious for recurrent peritoneal carcinomatosis. Increasing RIGHT LOWER lobe pleural-based changes with increased uptake, suspicious for malignant involvement. New hypermetabolic subcarinal node and LEFT para-aortic node.   Genetics: [employer] panel 10/16/23- Negative, no pathogenic mutations or VUS Foundation testing 10/6/23- TPS 0%, TC 0%, PDL1 0 IZAIAH score 24.5%, HRD Positive- likely benefit from maintenance niraparib MS-Stable MTAP loss, NF1 mut, TP53 mut, CDKN2A/B loss. FOLR-1 testing returned positive

## 2024-09-10 NOTE — DIETITIAN INITIAL EVALUATION ADULT - ADD RECOMMEND
Yamileth BAH [place]  September 10, 2024  Plan of Care Hand-off Note     Patient Care Path: observation    Plan for Care:   A lower level of care has been unsuccessful in treating and stabilizing patient's mental health symptoms. Patient's uncontrollable anxiety would be best supported by the safety and stabilization offered in the [facility] unit.   Patient will remain on [facility] unit under observation for continued monitoring, treatment and therapeutic intervention of mental health symptoms. Observation at [facility] could help mitigate the need for a more restrictive level of care in an inpatient setting.    Identified Goals and Safety Issues:    The goal of [facility] is for patient to reduce her anxiety symptoms and find that she is able to control her symptoms more effectively with coping skills and medications.   Patient is agreeable to meet with the psychiatric provider on [facility].     Overview:       Patient bismark Burleson [place], [phone] , to be contacted to coordinate discharge.         Legal Status: Legal Status at Admission: Voluntary/Patient has signed consent for treatment    Psychiatry Consult: Ordered and seen by K Taylor       Updated family regarding plan of care.           IRMA Alegria, Psychotherapist  [facility] - Triage & Transition Services  Callback: [phone]          
Encourage po intake, monitor diet tolerance, and provide assistance at meals as needed. Obtain daily weights to monitor trends.

## 2024-09-11 ENCOUNTER — OFFICE (OUTPATIENT)
Dept: URBAN - METROPOLITAN AREA CLINIC 12 | Facility: CLINIC | Age: 59
Setting detail: OPHTHALMOLOGY
End: 2024-09-11
Payer: MEDICAID

## 2024-09-11 DIAGNOSIS — H25.13: ICD-10-CM

## 2024-09-11 DIAGNOSIS — T45.1X5A: ICD-10-CM

## 2024-09-11 PROBLEM — H52.7 REFRACTIVE ERROR: Status: ACTIVE | Noted: 2024-09-11

## 2024-09-11 PROCEDURE — 92004 COMPRE OPH EXAM NEW PT 1/>: CPT | Performed by: STUDENT IN AN ORGANIZED HEALTH CARE EDUCATION/TRAINING PROGRAM

## 2024-09-11 ASSESSMENT — CONFRONTATIONAL VISUAL FIELD TEST (CVF)
OS_FINDINGS: FULL
OD_FINDINGS: FULL

## 2024-09-13 ENCOUNTER — NON-APPOINTMENT (OUTPATIENT)
Age: 59
End: 2024-09-13

## 2024-09-16 ENCOUNTER — APPOINTMENT (OUTPATIENT)
Dept: INFUSION THERAPY | Facility: CANCER CENTER | Age: 59
End: 2024-09-16

## 2024-09-16 ENCOUNTER — APPOINTMENT (OUTPATIENT)
Dept: HEMATOLOGY ONCOLOGY | Facility: CLINIC | Age: 59
End: 2024-09-16

## 2024-09-20 ENCOUNTER — RESULT REVIEW (OUTPATIENT)
Age: 59
End: 2024-09-20

## 2024-09-20 ENCOUNTER — APPOINTMENT (OUTPATIENT)
Dept: INFUSION THERAPY | Facility: CANCER CENTER | Age: 59
End: 2024-09-20

## 2024-09-20 LAB
ALBUMIN SERPL ELPH-MCNC: 3.7 G/DL — SIGNIFICANT CHANGE UP (ref 3.3–5)
ALP SERPL-CCNC: 81 U/L — SIGNIFICANT CHANGE UP (ref 40–120)
ALT FLD-CCNC: 23 U/L — SIGNIFICANT CHANGE UP (ref 10–45)
ANION GAP SERPL CALC-SCNC: 17 MMOL/L — SIGNIFICANT CHANGE UP (ref 5–17)
AST SERPL-CCNC: 22 U/L — SIGNIFICANT CHANGE UP (ref 10–40)
BASOPHILS # BLD AUTO: 0.04 K/UL — SIGNIFICANT CHANGE UP (ref 0–0.2)
BASOPHILS NFR BLD AUTO: 0.4 % — SIGNIFICANT CHANGE UP (ref 0–2)
BILIRUB SERPL-MCNC: 0.4 MG/DL — SIGNIFICANT CHANGE UP (ref 0.2–1.2)
BUN SERPL-MCNC: 34 MG/DL — HIGH (ref 7–23)
CALCIUM SERPL-MCNC: 9.3 MG/DL — SIGNIFICANT CHANGE UP (ref 8.4–10.5)
CANCER AG125 SERPL-ACNC: 1165 U/ML — HIGH
CHLORIDE SERPL-SCNC: 93 MMOL/L — LOW (ref 96–108)
CO2 SERPL-SCNC: 23 MMOL/L — SIGNIFICANT CHANGE UP (ref 22–31)
CREAT SERPL-MCNC: 1.79 MG/DL — HIGH (ref 0.5–1.3)
EGFR: 33 ML/MIN/1.73M2 — LOW
EOSINOPHIL # BLD AUTO: 0 K/UL — SIGNIFICANT CHANGE UP (ref 0–0.5)
EOSINOPHIL NFR BLD AUTO: 0 % — SIGNIFICANT CHANGE UP (ref 0–6)
GLUCOSE SERPL-MCNC: 125 MG/DL — HIGH (ref 70–99)
HCT VFR BLD CALC: 30.9 % — LOW (ref 34.5–45)
HGB BLD-MCNC: 10.1 G/DL — LOW (ref 11.5–15.5)
IMM GRANULOCYTES NFR BLD AUTO: 2 % — HIGH (ref 0–0.9)
LYMPHOCYTES # BLD AUTO: 1.09 K/UL — SIGNIFICANT CHANGE UP (ref 1–3.3)
LYMPHOCYTES # BLD AUTO: 9.7 % — LOW (ref 13–44)
MAGNESIUM SERPL-MCNC: 1.8 MG/DL — SIGNIFICANT CHANGE UP (ref 1.6–2.6)
MCHC RBC-ENTMCNC: 30.8 PG — SIGNIFICANT CHANGE UP (ref 27–34)
MCHC RBC-ENTMCNC: 32.7 GM/DL — SIGNIFICANT CHANGE UP (ref 32–36)
MCV RBC AUTO: 94.2 FL — SIGNIFICANT CHANGE UP (ref 80–100)
MONOCYTES # BLD AUTO: 0.7 K/UL — SIGNIFICANT CHANGE UP (ref 0–0.9)
MONOCYTES NFR BLD AUTO: 6.2 % — SIGNIFICANT CHANGE UP (ref 2–14)
NEUTROPHILS # BLD AUTO: 9.23 K/UL — HIGH (ref 1.8–7.4)
NEUTROPHILS NFR BLD AUTO: 81.7 % — HIGH (ref 43–77)
NRBC # BLD: 0 /100 WBCS — SIGNIFICANT CHANGE UP (ref 0–0)
PHOSPHATE SERPL-MCNC: 3.6 MG/DL — SIGNIFICANT CHANGE UP (ref 2.5–4.5)
PLATELET # BLD AUTO: 330 K/UL — SIGNIFICANT CHANGE UP (ref 150–400)
POTASSIUM SERPL-MCNC: 4.5 MMOL/L — SIGNIFICANT CHANGE UP (ref 3.5–5.3)
POTASSIUM SERPL-SCNC: 4.5 MMOL/L — SIGNIFICANT CHANGE UP (ref 3.5–5.3)
PROT SERPL-MCNC: 7.1 G/DL — SIGNIFICANT CHANGE UP (ref 6–8.3)
RBC # BLD: 3.28 M/UL — LOW (ref 3.8–5.2)
RBC # FLD: 13.9 % — SIGNIFICANT CHANGE UP (ref 10.3–14.5)
SODIUM SERPL-SCNC: 133 MMOL/L — LOW (ref 135–145)
WBC # BLD: 11.28 K/UL — HIGH (ref 3.8–10.5)
WBC # FLD AUTO: 11.28 K/UL — HIGH (ref 3.8–10.5)

## 2024-09-21 LAB
HBV CORE AB SER-ACNC: SIGNIFICANT CHANGE UP
HBV SURFACE AB SER-ACNC: SIGNIFICANT CHANGE UP
HBV SURFACE AG SER-ACNC: SIGNIFICANT CHANGE UP
HCV AB S/CO SERPL IA: 0.09 S/CO — SIGNIFICANT CHANGE UP (ref 0–0.99)
HCV AB SERPL-IMP: SIGNIFICANT CHANGE UP

## 2024-09-23 ENCOUNTER — RESULT REVIEW (OUTPATIENT)
Age: 59
End: 2024-09-23

## 2024-09-23 ENCOUNTER — APPOINTMENT (OUTPATIENT)
Dept: INFUSION THERAPY | Facility: CANCER CENTER | Age: 59
End: 2024-09-23

## 2024-09-23 DIAGNOSIS — Z51.11 ENCOUNTER FOR ANTINEOPLASTIC CHEMOTHERAPY: ICD-10-CM

## 2024-09-23 DIAGNOSIS — R52 PAIN, UNSPECIFIED: ICD-10-CM

## 2024-09-23 DIAGNOSIS — R11.2 NAUSEA WITH VOMITING, UNSPECIFIED: ICD-10-CM

## 2024-09-23 LAB
BASOPHILS # BLD AUTO: 0.03 K/UL — SIGNIFICANT CHANGE UP (ref 0–0.2)
BASOPHILS NFR BLD AUTO: 0.2 % — SIGNIFICANT CHANGE UP (ref 0–2)
EOSINOPHIL # BLD AUTO: 0.02 K/UL — SIGNIFICANT CHANGE UP (ref 0–0.5)
EOSINOPHIL NFR BLD AUTO: 0.2 % — SIGNIFICANT CHANGE UP (ref 0–6)
HCT VFR BLD CALC: 34 % — LOW (ref 34.5–45)
HGB BLD-MCNC: 11.2 G/DL — LOW (ref 11.5–15.5)
IMM GRANULOCYTES NFR BLD AUTO: 0.7 % — SIGNIFICANT CHANGE UP (ref 0–0.9)
LYMPHOCYTES # BLD AUTO: 1.33 K/UL — SIGNIFICANT CHANGE UP (ref 1–3.3)
LYMPHOCYTES # BLD AUTO: 10 % — LOW (ref 13–44)
MCHC RBC-ENTMCNC: 30.6 PG — SIGNIFICANT CHANGE UP (ref 27–34)
MCHC RBC-ENTMCNC: 32.9 GM/DL — SIGNIFICANT CHANGE UP (ref 32–36)
MCV RBC AUTO: 92.9 FL — SIGNIFICANT CHANGE UP (ref 80–100)
MONOCYTES # BLD AUTO: 0.8 K/UL — SIGNIFICANT CHANGE UP (ref 0–0.9)
MONOCYTES NFR BLD AUTO: 6 % — SIGNIFICANT CHANGE UP (ref 2–14)
NEUTROPHILS # BLD AUTO: 10.97 K/UL — HIGH (ref 1.8–7.4)
NEUTROPHILS NFR BLD AUTO: 82.9 % — HIGH (ref 43–77)
NRBC # BLD: 0 /100 WBCS — SIGNIFICANT CHANGE UP (ref 0–0)
PLATELET # BLD AUTO: 329 K/UL — SIGNIFICANT CHANGE UP (ref 150–400)
RBC # BLD: 3.66 M/UL — LOW (ref 3.8–5.2)
RBC # FLD: 13.9 % — SIGNIFICANT CHANGE UP (ref 10.3–14.5)
WBC # BLD: 13.24 K/UL — HIGH (ref 3.8–10.5)
WBC # FLD AUTO: 13.24 K/UL — HIGH (ref 3.8–10.5)

## 2024-09-24 LAB
ALBUMIN SERPL ELPH-MCNC: 3.9 G/DL — SIGNIFICANT CHANGE UP (ref 3.3–5)
ALP SERPL-CCNC: 112 U/L — SIGNIFICANT CHANGE UP (ref 40–120)
ALT FLD-CCNC: 43 U/L — SIGNIFICANT CHANGE UP (ref 10–45)
ANION GAP SERPL CALC-SCNC: 19 MMOL/L — HIGH (ref 5–17)
AST SERPL-CCNC: 37 U/L — SIGNIFICANT CHANGE UP (ref 10–40)
BILIRUB SERPL-MCNC: 0.4 MG/DL — SIGNIFICANT CHANGE UP (ref 0.2–1.2)
BUN SERPL-MCNC: 46 MG/DL — HIGH (ref 7–23)
CALCIUM SERPL-MCNC: 9.3 MG/DL — SIGNIFICANT CHANGE UP (ref 8.4–10.5)
CHLORIDE SERPL-SCNC: 92 MMOL/L — LOW (ref 96–108)
CO2 SERPL-SCNC: 22 MMOL/L — SIGNIFICANT CHANGE UP (ref 22–31)
CREAT SERPL-MCNC: 2.23 MG/DL — HIGH (ref 0.5–1.3)
EGFR: 25 ML/MIN/1.73M2 — LOW
GLUCOSE SERPL-MCNC: 117 MG/DL — HIGH (ref 70–99)
MAGNESIUM SERPL-MCNC: 2.2 MG/DL — SIGNIFICANT CHANGE UP (ref 1.6–2.6)
PHOSPHATE SERPL-MCNC: 3.8 MG/DL — SIGNIFICANT CHANGE UP (ref 2.5–4.5)
POTASSIUM SERPL-MCNC: 3.8 MMOL/L — SIGNIFICANT CHANGE UP (ref 3.5–5.3)
POTASSIUM SERPL-SCNC: 3.8 MMOL/L — SIGNIFICANT CHANGE UP (ref 3.5–5.3)
PROT SERPL-MCNC: 7.2 G/DL — SIGNIFICANT CHANGE UP (ref 6–8.3)
SODIUM SERPL-SCNC: 132 MMOL/L — LOW (ref 135–145)

## 2024-09-25 ENCOUNTER — APPOINTMENT (OUTPATIENT)
Dept: OPHTHALMOLOGY | Facility: CLINIC | Age: 59
End: 2024-09-25

## 2024-09-27 ENCOUNTER — RESULT REVIEW (OUTPATIENT)
Age: 59
End: 2024-09-27

## 2024-09-27 ENCOUNTER — APPOINTMENT (OUTPATIENT)
Dept: INFUSION THERAPY | Facility: CANCER CENTER | Age: 59
End: 2024-09-27

## 2024-09-28 LAB
ALBUMIN SERPL ELPH-MCNC: 3.5 G/DL — SIGNIFICANT CHANGE UP (ref 3.3–5)
ALP SERPL-CCNC: 91 U/L — SIGNIFICANT CHANGE UP (ref 40–120)
ALT FLD-CCNC: 49 U/L — HIGH (ref 10–45)
ANION GAP SERPL CALC-SCNC: 16 MMOL/L — SIGNIFICANT CHANGE UP (ref 5–17)
AST SERPL-CCNC: 30 U/L — SIGNIFICANT CHANGE UP (ref 10–40)
BILIRUB SERPL-MCNC: 0.2 MG/DL — SIGNIFICANT CHANGE UP (ref 0.2–1.2)
BUN SERPL-MCNC: 35 MG/DL — HIGH (ref 7–23)
CALCIUM SERPL-MCNC: 8.7 MG/DL — SIGNIFICANT CHANGE UP (ref 8.4–10.5)
CHLORIDE SERPL-SCNC: 99 MMOL/L — SIGNIFICANT CHANGE UP (ref 96–108)
CO2 SERPL-SCNC: 21 MMOL/L — LOW (ref 22–31)
CREAT SERPL-MCNC: 1.44 MG/DL — HIGH (ref 0.5–1.3)
EGFR: 42 ML/MIN/1.73M2 — LOW
GLUCOSE SERPL-MCNC: 68 MG/DL — LOW (ref 70–99)
HCT VFR BLD CALC: 29.6 % — LOW (ref 34.5–45)
HGB BLD-MCNC: 9.6 G/DL — LOW (ref 11.5–15.5)
MAGNESIUM SERPL-MCNC: 1.9 MG/DL — SIGNIFICANT CHANGE UP (ref 1.6–2.6)
MCHC RBC-ENTMCNC: 30.9 PG — SIGNIFICANT CHANGE UP (ref 27–34)
MCHC RBC-ENTMCNC: 32.4 GM/DL — SIGNIFICANT CHANGE UP (ref 32–36)
MCV RBC AUTO: 95.2 FL — SIGNIFICANT CHANGE UP (ref 80–100)
PHOSPHATE SERPL-MCNC: 2 MG/DL — LOW (ref 2.5–4.5)
PLATELET # BLD AUTO: 263 K/UL — SIGNIFICANT CHANGE UP (ref 150–400)
POTASSIUM SERPL-MCNC: 4.1 MMOL/L — SIGNIFICANT CHANGE UP (ref 3.5–5.3)
POTASSIUM SERPL-SCNC: 4.1 MMOL/L — SIGNIFICANT CHANGE UP (ref 3.5–5.3)
PROT SERPL-MCNC: 5.9 G/DL — LOW (ref 6–8.3)
RBC # BLD: 3.11 M/UL — LOW (ref 3.8–5.2)
RBC # FLD: 14.3 % — SIGNIFICANT CHANGE UP (ref 10.3–14.5)
SODIUM SERPL-SCNC: 136 MMOL/L — SIGNIFICANT CHANGE UP (ref 135–145)
WBC # BLD: 15.53 K/UL — HIGH (ref 3.8–10.5)
WBC # FLD AUTO: 15.53 K/UL — HIGH (ref 3.8–10.5)

## 2024-10-01 ENCOUNTER — RESULT REVIEW (OUTPATIENT)
Age: 59
End: 2024-10-01

## 2024-10-01 ENCOUNTER — APPOINTMENT (OUTPATIENT)
Dept: INFUSION THERAPY | Facility: CANCER CENTER | Age: 59
End: 2024-10-01

## 2024-10-01 LAB
BASOPHILS # BLD AUTO: 0.01 K/UL — SIGNIFICANT CHANGE UP (ref 0–0.2)
BASOPHILS NFR BLD AUTO: 0.1 % — SIGNIFICANT CHANGE UP (ref 0–2)
EOSINOPHIL # BLD AUTO: 0 K/UL — SIGNIFICANT CHANGE UP (ref 0–0.5)
EOSINOPHIL NFR BLD AUTO: 0 % — SIGNIFICANT CHANGE UP (ref 0–6)
HCT VFR BLD CALC: 28.6 % — LOW (ref 34.5–45)
HGB BLD-MCNC: 9.4 G/DL — LOW (ref 11.5–15.5)
IMM GRANULOCYTES NFR BLD AUTO: 1.9 % — HIGH (ref 0–0.9)
LYMPHOCYTES # BLD AUTO: 0.82 K/UL — LOW (ref 1–3.3)
LYMPHOCYTES # BLD AUTO: 7.2 % — LOW (ref 13–44)
MCHC RBC-ENTMCNC: 30.4 PG — SIGNIFICANT CHANGE UP (ref 27–34)
MCHC RBC-ENTMCNC: 32.9 GM/DL — SIGNIFICANT CHANGE UP (ref 32–36)
MCV RBC AUTO: 92.6 FL — SIGNIFICANT CHANGE UP (ref 80–100)
MONOCYTES # BLD AUTO: 0.69 K/UL — SIGNIFICANT CHANGE UP (ref 0–0.9)
MONOCYTES NFR BLD AUTO: 6.1 % — SIGNIFICANT CHANGE UP (ref 2–14)
NEUTROPHILS # BLD AUTO: 9.6 K/UL — HIGH (ref 1.8–7.4)
NEUTROPHILS NFR BLD AUTO: 84.7 % — HIGH (ref 43–77)
NRBC # BLD: 0 /100 WBCS — SIGNIFICANT CHANGE UP (ref 0–0)
PLATELET # BLD AUTO: 214 K/UL — SIGNIFICANT CHANGE UP (ref 150–400)
RBC # BLD: 3.09 M/UL — LOW (ref 3.8–5.2)
RBC # FLD: 14.2 % — SIGNIFICANT CHANGE UP (ref 10.3–14.5)
WBC # BLD: 11.33 K/UL — HIGH (ref 3.8–10.5)
WBC # FLD AUTO: 11.33 K/UL — HIGH (ref 3.8–10.5)

## 2024-10-02 ENCOUNTER — NON-APPOINTMENT (OUTPATIENT)
Age: 59
End: 2024-10-02

## 2024-10-02 LAB
ALBUMIN SERPL ELPH-MCNC: 3.2 G/DL — LOW (ref 3.3–5)
ALP SERPL-CCNC: 78 U/L — SIGNIFICANT CHANGE UP (ref 40–120)
ALT FLD-CCNC: 48 U/L — HIGH (ref 10–45)
ANION GAP SERPL CALC-SCNC: 11 MMOL/L — SIGNIFICANT CHANGE UP (ref 5–17)
AST SERPL-CCNC: 20 U/L — SIGNIFICANT CHANGE UP (ref 10–40)
BILIRUB SERPL-MCNC: 0.2 MG/DL — SIGNIFICANT CHANGE UP (ref 0.2–1.2)
BUN SERPL-MCNC: 24 MG/DL — HIGH (ref 7–23)
CALCIUM SERPL-MCNC: 8.1 MG/DL — LOW (ref 8.4–10.5)
CHLORIDE SERPL-SCNC: 106 MMOL/L — SIGNIFICANT CHANGE UP (ref 96–108)
CO2 SERPL-SCNC: 22 MMOL/L — SIGNIFICANT CHANGE UP (ref 22–31)
CREAT SERPL-MCNC: 0.89 MG/DL — SIGNIFICANT CHANGE UP (ref 0.5–1.3)
EGFR: 76 ML/MIN/1.73M2 — SIGNIFICANT CHANGE UP
GLUCOSE SERPL-MCNC: 103 MG/DL — HIGH (ref 70–99)
MAGNESIUM SERPL-MCNC: 1.3 MG/DL — LOW (ref 1.6–2.6)
PHOSPHATE SERPL-MCNC: 2.2 MG/DL — LOW (ref 2.5–4.5)
POTASSIUM SERPL-MCNC: 3.4 MMOL/L — LOW (ref 3.5–5.3)
POTASSIUM SERPL-SCNC: 3.4 MMOL/L — LOW (ref 3.5–5.3)
PROT SERPL-MCNC: 5.3 G/DL — LOW (ref 6–8.3)
SODIUM SERPL-SCNC: 138 MMOL/L — SIGNIFICANT CHANGE UP (ref 135–145)

## 2024-10-02 RX ORDER — POTASSIUM CHLORIDE 1500 MG/1
20 TABLET, EXTENDED RELEASE ORAL
Qty: 20 | Refills: 0 | Status: ACTIVE | COMMUNITY
Start: 2024-10-02 | End: 1900-01-01

## 2024-10-02 RX ORDER — ELECTROLYTES/DEXTROSE
64 SOLUTION, ORAL ORAL
Qty: 14 | Refills: 0 | Status: ACTIVE | COMMUNITY
Start: 2024-10-02 | End: 1900-01-01

## 2024-10-03 ENCOUNTER — APPOINTMENT (OUTPATIENT)
Dept: INFUSION THERAPY | Facility: CANCER CENTER | Age: 59
End: 2024-10-03

## 2024-10-08 ENCOUNTER — RESULT REVIEW (OUTPATIENT)
Age: 59
End: 2024-10-08

## 2024-10-08 ENCOUNTER — APPOINTMENT (OUTPATIENT)
Dept: HEMATOLOGY ONCOLOGY | Facility: CLINIC | Age: 59
End: 2024-10-08

## 2024-10-08 LAB
BASOPHILS # BLD AUTO: 0.02 K/UL — SIGNIFICANT CHANGE UP (ref 0–0.2)
BASOPHILS NFR BLD AUTO: 0.3 % — SIGNIFICANT CHANGE UP (ref 0–2)
EOSINOPHIL # BLD AUTO: 0.01 K/UL — SIGNIFICANT CHANGE UP (ref 0–0.5)
EOSINOPHIL NFR BLD AUTO: 0.1 % — SIGNIFICANT CHANGE UP (ref 0–6)
HCT VFR BLD CALC: 31.3 % — LOW (ref 34.5–45)
HGB BLD-MCNC: 9.9 G/DL — LOW (ref 11.5–15.5)
IMM GRANULOCYTES NFR BLD AUTO: 0.7 % — SIGNIFICANT CHANGE UP (ref 0–0.9)
LYMPHOCYTES # BLD AUTO: 1.2 K/UL — SIGNIFICANT CHANGE UP (ref 1–3.3)
LYMPHOCYTES # BLD AUTO: 17.3 % — SIGNIFICANT CHANGE UP (ref 13–44)
MCHC RBC-ENTMCNC: 30.2 PG — SIGNIFICANT CHANGE UP (ref 27–34)
MCHC RBC-ENTMCNC: 31.6 GM/DL — LOW (ref 32–36)
MCV RBC AUTO: 95.4 FL — SIGNIFICANT CHANGE UP (ref 80–100)
MONOCYTES # BLD AUTO: 0.53 K/UL — SIGNIFICANT CHANGE UP (ref 0–0.9)
MONOCYTES NFR BLD AUTO: 7.7 % — SIGNIFICANT CHANGE UP (ref 2–14)
NEUTROPHILS # BLD AUTO: 5.11 K/UL — SIGNIFICANT CHANGE UP (ref 1.8–7.4)
NEUTROPHILS NFR BLD AUTO: 73.9 % — SIGNIFICANT CHANGE UP (ref 43–77)
NRBC # BLD: 0 /100 WBCS — SIGNIFICANT CHANGE UP (ref 0–0)
PLATELET # BLD AUTO: 221 K/UL — SIGNIFICANT CHANGE UP (ref 150–400)
RBC # BLD: 3.28 M/UL — LOW (ref 3.8–5.2)
RBC # FLD: 15 % — HIGH (ref 10.3–14.5)
WBC # BLD: 6.92 K/UL — SIGNIFICANT CHANGE UP (ref 3.8–10.5)
WBC # FLD AUTO: 6.92 K/UL — SIGNIFICANT CHANGE UP (ref 3.8–10.5)

## 2024-10-09 LAB
ALBUMIN SERPL ELPH-MCNC: 3.6 G/DL
ALP BLD-CCNC: 90 U/L
ALT SERPL-CCNC: 38 U/L
ANION GAP SERPL CALC-SCNC: 15 MMOL/L
AST SERPL-CCNC: 20 U/L
BILIRUB SERPL-MCNC: 0.3 MG/DL
BUN SERPL-MCNC: 17 MG/DL
CALCIUM SERPL-MCNC: 8.2 MG/DL
CANCER AG125 SERPL-ACNC: 1194 U/ML
CHLORIDE SERPL-SCNC: 107 MMOL/L
CO2 SERPL-SCNC: 20 MMOL/L
CREAT SERPL-MCNC: 0.94 MG/DL
EGFR: 71 ML/MIN/1.73M2
GLUCOSE SERPL-MCNC: 116 MG/DL
MAGNESIUM SERPL-MCNC: 1.2 MG/DL
PHOSPHATE SERPL-MCNC: 3.2 MG/DL
POTASSIUM SERPL-SCNC: 4.3 MMOL/L
PROT SERPL-MCNC: 5.9 G/DL
SODIUM SERPL-SCNC: 142 MMOL/L

## 2024-10-10 ENCOUNTER — RESULT REVIEW (OUTPATIENT)
Age: 59
End: 2024-10-10

## 2024-10-10 ENCOUNTER — APPOINTMENT (OUTPATIENT)
Dept: HEMATOLOGY ONCOLOGY | Facility: CLINIC | Age: 59
End: 2024-10-10
Payer: COMMERCIAL

## 2024-10-10 ENCOUNTER — APPOINTMENT (OUTPATIENT)
Dept: INFUSION THERAPY | Facility: CANCER CENTER | Age: 59
End: 2024-10-10

## 2024-10-10 LAB
BASOPHILS # BLD AUTO: 0.01 K/UL — SIGNIFICANT CHANGE UP (ref 0–0.2)
BASOPHILS NFR BLD AUTO: 0.2 % — SIGNIFICANT CHANGE UP (ref 0–2)
EOSINOPHIL # BLD AUTO: 0 K/UL — SIGNIFICANT CHANGE UP (ref 0–0.5)
EOSINOPHIL NFR BLD AUTO: 0 % — SIGNIFICANT CHANGE UP (ref 0–6)
HCT VFR BLD CALC: 28.6 % — LOW (ref 34.5–45)
HGB BLD-MCNC: 9.3 G/DL — LOW (ref 11.5–15.5)
IMM GRANULOCYTES NFR BLD AUTO: 0.9 % — SIGNIFICANT CHANGE UP (ref 0–0.9)
LYMPHOCYTES # BLD AUTO: 1.18 K/UL — SIGNIFICANT CHANGE UP (ref 1–3.3)
LYMPHOCYTES # BLD AUTO: 27.5 % — SIGNIFICANT CHANGE UP (ref 13–44)
MCHC RBC-ENTMCNC: 30.7 PG — SIGNIFICANT CHANGE UP (ref 27–34)
MCHC RBC-ENTMCNC: 32.5 GM/DL — SIGNIFICANT CHANGE UP (ref 32–36)
MCV RBC AUTO: 94.4 FL — SIGNIFICANT CHANGE UP (ref 80–100)
MONOCYTES # BLD AUTO: 0.48 K/UL — SIGNIFICANT CHANGE UP (ref 0–0.9)
MONOCYTES NFR BLD AUTO: 11.2 % — SIGNIFICANT CHANGE UP (ref 2–14)
NEUTROPHILS # BLD AUTO: 2.58 K/UL — SIGNIFICANT CHANGE UP (ref 1.8–7.4)
NEUTROPHILS NFR BLD AUTO: 60.2 % — SIGNIFICANT CHANGE UP (ref 43–77)
NRBC # BLD: 0 /100 WBCS — SIGNIFICANT CHANGE UP (ref 0–0)
PLATELET # BLD AUTO: 192 K/UL — SIGNIFICANT CHANGE UP (ref 150–400)
RBC # BLD: 3.03 M/UL — LOW (ref 3.8–5.2)
RBC # FLD: 15.1 % — HIGH (ref 10.3–14.5)
WBC # BLD: 4.29 K/UL — SIGNIFICANT CHANGE UP (ref 3.8–10.5)
WBC # FLD AUTO: 4.29 K/UL — SIGNIFICANT CHANGE UP (ref 3.8–10.5)

## 2024-10-10 PROCEDURE — 86304 IMMUNOASSAY TUMOR CA 125: CPT

## 2024-10-10 PROCEDURE — 96374 THER/PROPH/DIAG INJ IV PUSH: CPT

## 2024-10-10 PROCEDURE — 96376 TX/PRO/DX INJ SAME DRUG ADON: CPT

## 2024-10-10 PROCEDURE — 87340 HEPATITIS B SURFACE AG IA: CPT

## 2024-10-10 PROCEDURE — 96360 HYDRATION IV INFUSION INIT: CPT

## 2024-10-10 PROCEDURE — 85027 COMPLETE CBC AUTOMATED: CPT

## 2024-10-10 PROCEDURE — 96415 CHEMO IV INFUSION ADDL HR: CPT

## 2024-10-10 PROCEDURE — 96413 CHEMO IV INFUSION 1 HR: CPT

## 2024-10-10 PROCEDURE — 86704 HEP B CORE ANTIBODY TOTAL: CPT

## 2024-10-10 PROCEDURE — 83735 ASSAY OF MAGNESIUM: CPT

## 2024-10-10 PROCEDURE — 36415 COLL VENOUS BLD VENIPUNCTURE: CPT

## 2024-10-10 PROCEDURE — 96361 HYDRATE IV INFUSION ADD-ON: CPT

## 2024-10-10 PROCEDURE — G2211 COMPLEX E/M VISIT ADD ON: CPT

## 2024-10-10 PROCEDURE — 96367 TX/PROPH/DG ADDL SEQ IV INF: CPT

## 2024-10-10 PROCEDURE — 80053 COMPREHEN METABOLIC PANEL: CPT

## 2024-10-10 PROCEDURE — 86706 HEP B SURFACE ANTIBODY: CPT

## 2024-10-10 PROCEDURE — 84100 ASSAY OF PHOSPHORUS: CPT

## 2024-10-10 PROCEDURE — 96366 THER/PROPH/DIAG IV INF ADDON: CPT

## 2024-10-10 PROCEDURE — 96375 TX/PRO/DX INJ NEW DRUG ADDON: CPT

## 2024-10-10 PROCEDURE — 99215 OFFICE O/P EST HI 40 MIN: CPT

## 2024-10-10 PROCEDURE — 86803 HEPATITIS C AB TEST: CPT

## 2024-10-10 RX ORDER — DEXAMETHASONE 4 MG/1
4 TABLET ORAL
Qty: 10 | Refills: 2 | Status: ACTIVE | COMMUNITY
Start: 2024-10-10 | End: 1900-01-01

## 2024-10-11 DIAGNOSIS — E86.0 DEHYDRATION: ICD-10-CM

## 2024-10-11 LAB
ALBUMIN SERPL ELPH-MCNC: 3.6 G/DL — SIGNIFICANT CHANGE UP (ref 3.3–5)
ALP SERPL-CCNC: 86 U/L — SIGNIFICANT CHANGE UP (ref 40–120)
ALT FLD-CCNC: 33 U/L — SIGNIFICANT CHANGE UP (ref 10–45)
ANION GAP SERPL CALC-SCNC: 12 MMOL/L — SIGNIFICANT CHANGE UP (ref 5–17)
AST SERPL-CCNC: 20 U/L — SIGNIFICANT CHANGE UP (ref 10–40)
BILIRUB SERPL-MCNC: 0.3 MG/DL — SIGNIFICANT CHANGE UP (ref 0.2–1.2)
BUN SERPL-MCNC: 17 MG/DL — SIGNIFICANT CHANGE UP (ref 7–23)
CALCIUM SERPL-MCNC: 8.4 MG/DL — SIGNIFICANT CHANGE UP (ref 8.4–10.5)
CANCER AG125 SERPL-ACNC: 1104 U/ML — HIGH
CHLORIDE SERPL-SCNC: 105 MMOL/L — SIGNIFICANT CHANGE UP (ref 96–108)
CO2 SERPL-SCNC: 23 MMOL/L — SIGNIFICANT CHANGE UP (ref 22–31)
CREAT SERPL-MCNC: 0.98 MG/DL — SIGNIFICANT CHANGE UP (ref 0.5–1.3)
EGFR: 67 ML/MIN/1.73M2 — SIGNIFICANT CHANGE UP
GLUCOSE SERPL-MCNC: 97 MG/DL — SIGNIFICANT CHANGE UP (ref 70–99)
MAGNESIUM SERPL-MCNC: 1.5 MG/DL — LOW (ref 1.6–2.6)
PHOSPHATE SERPL-MCNC: 3 MG/DL — SIGNIFICANT CHANGE UP (ref 2.5–4.5)
POTASSIUM SERPL-MCNC: 4.1 MMOL/L — SIGNIFICANT CHANGE UP (ref 3.5–5.3)
POTASSIUM SERPL-SCNC: 4.1 MMOL/L — SIGNIFICANT CHANGE UP (ref 3.5–5.3)
PROT SERPL-MCNC: 6.3 G/DL — SIGNIFICANT CHANGE UP (ref 6–8.3)
SODIUM SERPL-SCNC: 139 MMOL/L — SIGNIFICANT CHANGE UP (ref 135–145)

## 2024-10-15 ENCOUNTER — APPOINTMENT (OUTPATIENT)
Dept: PHYSICAL MEDICINE AND REHAB | Facility: CLINIC | Age: 59
End: 2024-10-15
Payer: COMMERCIAL

## 2024-10-15 VITALS
DIASTOLIC BLOOD PRESSURE: 84 MMHG | BODY MASS INDEX: 29.77 KG/M2 | HEART RATE: 105 BPM | WEIGHT: 168 LBS | HEIGHT: 63 IN | RESPIRATION RATE: 14 BRPM | SYSTOLIC BLOOD PRESSURE: 125 MMHG

## 2024-10-15 DIAGNOSIS — G89.3 NEOPLASM RELATED PAIN (ACUTE) (CHRONIC): ICD-10-CM

## 2024-10-15 DIAGNOSIS — C56.9 MALIGNANT NEOPLASM OF UNSPECIFIED OVARY: ICD-10-CM

## 2024-10-15 PROCEDURE — 99203 OFFICE O/P NEW LOW 30 MIN: CPT

## 2024-10-21 NOTE — DISCHARGE NOTE PROVIDER - HOSPITAL COURSE
PENELOPE FLAHERTY is a 58y now s/p ex-lap AZALIA BSO, cytoreductive surgery HIPEC, ostomy creation, c/b bladder perforation, repaired intraoperatively; received 2u pRBC intraoperatively. Total 4u pRBCs during hospitalization. Patient had a post-operative ileus. Diet slowly advanced and patient began to have function in her ostomy. Doe catheter to remain in place due to bladder injury intraoperatively; to be removed 2-3w post operatively after CT urogram is performed. Labs and vitals stable upon discharge to home. [id number] PENELOPE FLAHERTY is a 58y now s/p ex-lap AZALIA BSO, cytoreductive surgery HIPEC, ostomy creation, c/b bladder perforation, repaired intraoperatively; received 2u pRBC intraoperatively. Total 4u pRBCs during hospitalization. Patient had a post-operative ileus. Diet slowly advanced and patient began to have function in her ostomy. Doe catheter to remain in place due to bladder injury intraoperatively; to be continued for at least 6 weeks outpatient and with follow up appointments with Dr Perez and Urology in the interim. Labs and vitals stable upon discharge to home.

## 2024-11-01 ENCOUNTER — APPOINTMENT (OUTPATIENT)
Dept: INFUSION THERAPY | Facility: CANCER CENTER | Age: 59
End: 2024-11-01

## 2024-11-01 ENCOUNTER — RESULT REVIEW (OUTPATIENT)
Age: 59
End: 2024-11-01

## 2024-11-01 ENCOUNTER — OUTPATIENT (OUTPATIENT)
Dept: OUTPATIENT SERVICES | Facility: HOSPITAL | Age: 59
LOS: 1 days | End: 2024-11-01
Payer: MEDICAID

## 2024-11-01 ENCOUNTER — APPOINTMENT (OUTPATIENT)
Dept: HEMATOLOGY ONCOLOGY | Facility: CLINIC | Age: 59
End: 2024-11-01
Payer: COMMERCIAL

## 2024-11-01 DIAGNOSIS — D64.9 ANEMIA, UNSPECIFIED: ICD-10-CM

## 2024-11-01 DIAGNOSIS — Z98.890 OTHER SPECIFIED POSTPROCEDURAL STATES: Chronic | ICD-10-CM

## 2024-11-01 DIAGNOSIS — C56.9 MALIGNANT NEOPLASM OF UNSPECIFIED OVARY: ICD-10-CM

## 2024-11-01 LAB
BASOPHILS # BLD AUTO: 0.02 K/UL — SIGNIFICANT CHANGE UP (ref 0–0.2)
BASOPHILS NFR BLD AUTO: 0.4 % — SIGNIFICANT CHANGE UP (ref 0–2)
EOSINOPHIL # BLD AUTO: 0 K/UL — SIGNIFICANT CHANGE UP (ref 0–0.5)
EOSINOPHIL NFR BLD AUTO: 0 % — SIGNIFICANT CHANGE UP (ref 0–6)
HCT VFR BLD CALC: 29 % — LOW (ref 34.5–45)
HGB BLD-MCNC: 9.1 G/DL — LOW (ref 11.5–15.5)
IMM GRANULOCYTES NFR BLD AUTO: 1.5 % — HIGH (ref 0–0.9)
LYMPHOCYTES # BLD AUTO: 1.35 K/UL — SIGNIFICANT CHANGE UP (ref 1–3.3)
LYMPHOCYTES # BLD AUTO: 25.3 % — SIGNIFICANT CHANGE UP (ref 13–44)
MCHC RBC-ENTMCNC: 29.3 PG — SIGNIFICANT CHANGE UP (ref 27–34)
MCHC RBC-ENTMCNC: 31.4 G/DL — LOW (ref 32–36)
MCV RBC AUTO: 93.2 FL — SIGNIFICANT CHANGE UP (ref 80–100)
MONOCYTES # BLD AUTO: 0.13 K/UL — SIGNIFICANT CHANGE UP (ref 0–0.9)
MONOCYTES NFR BLD AUTO: 2.4 % — SIGNIFICANT CHANGE UP (ref 2–14)
NEUTROPHILS # BLD AUTO: 3.75 K/UL — SIGNIFICANT CHANGE UP (ref 1.8–7.4)
NEUTROPHILS NFR BLD AUTO: 70.4 % — SIGNIFICANT CHANGE UP (ref 43–77)
NRBC # BLD: 0 /100 WBCS — SIGNIFICANT CHANGE UP (ref 0–0)
PLATELET # BLD AUTO: 394 K/UL — SIGNIFICANT CHANGE UP (ref 150–400)
RBC # BLD: 3.11 M/UL — LOW (ref 3.8–5.2)
RBC # FLD: 15.5 % — HIGH (ref 10.3–14.5)
WBC # BLD: 5.33 K/UL — SIGNIFICANT CHANGE UP (ref 3.8–10.5)
WBC # FLD AUTO: 5.33 K/UL — SIGNIFICANT CHANGE UP (ref 3.8–10.5)

## 2024-11-01 PROCEDURE — 96367 TX/PROPH/DG ADDL SEQ IV INF: CPT

## 2024-11-01 PROCEDURE — 86304 IMMUNOASSAY TUMOR CA 125: CPT

## 2024-11-01 PROCEDURE — 83735 ASSAY OF MAGNESIUM: CPT

## 2024-11-01 PROCEDURE — 96415 CHEMO IV INFUSION ADDL HR: CPT

## 2024-11-01 PROCEDURE — 96375 TX/PRO/DX INJ NEW DRUG ADDON: CPT

## 2024-11-01 PROCEDURE — 85027 COMPLETE CBC AUTOMATED: CPT

## 2024-11-01 PROCEDURE — 96372 THER/PROPH/DIAG INJ SC/IM: CPT

## 2024-11-01 PROCEDURE — 84100 ASSAY OF PHOSPHORUS: CPT

## 2024-11-01 PROCEDURE — 80053 COMPREHEN METABOLIC PANEL: CPT

## 2024-11-01 PROCEDURE — 96413 CHEMO IV INFUSION 1 HR: CPT

## 2024-11-01 PROCEDURE — 99215 OFFICE O/P EST HI 40 MIN: CPT

## 2024-11-02 LAB
ALBUMIN SERPL ELPH-MCNC: 3.6 G/DL — SIGNIFICANT CHANGE UP (ref 3.3–5)
ALP SERPL-CCNC: 120 U/L — SIGNIFICANT CHANGE UP (ref 40–120)
ALT FLD-CCNC: 20 U/L — SIGNIFICANT CHANGE UP (ref 10–45)
ANION GAP SERPL CALC-SCNC: 15 MMOL/L — SIGNIFICANT CHANGE UP (ref 5–17)
AST SERPL-CCNC: 21 U/L — SIGNIFICANT CHANGE UP (ref 10–40)
BILIRUB SERPL-MCNC: 0.3 MG/DL — SIGNIFICANT CHANGE UP (ref 0.2–1.2)
BUN SERPL-MCNC: 13 MG/DL — SIGNIFICANT CHANGE UP (ref 7–23)
CALCIUM SERPL-MCNC: 8.9 MG/DL — SIGNIFICANT CHANGE UP (ref 8.4–10.5)
CANCER AG125 SERPL-ACNC: 848 U/ML — HIGH
CHLORIDE SERPL-SCNC: 102 MMOL/L — SIGNIFICANT CHANGE UP (ref 96–108)
CO2 SERPL-SCNC: 22 MMOL/L — SIGNIFICANT CHANGE UP (ref 22–31)
CREAT SERPL-MCNC: 1.25 MG/DL — SIGNIFICANT CHANGE UP (ref 0.5–1.3)
EGFR: 50 ML/MIN/1.73M2 — LOW
GLUCOSE SERPL-MCNC: 178 MG/DL — HIGH (ref 70–99)
MAGNESIUM SERPL-MCNC: 1.3 MG/DL — LOW (ref 1.6–2.6)
PHOSPHATE SERPL-MCNC: 3.4 MG/DL — SIGNIFICANT CHANGE UP (ref 2.5–4.5)
POTASSIUM SERPL-MCNC: 3.9 MMOL/L — SIGNIFICANT CHANGE UP (ref 3.5–5.3)
POTASSIUM SERPL-SCNC: 3.9 MMOL/L — SIGNIFICANT CHANGE UP (ref 3.5–5.3)
PROT SERPL-MCNC: 6.3 G/DL — SIGNIFICANT CHANGE UP (ref 6–8.3)
SODIUM SERPL-SCNC: 140 MMOL/L — SIGNIFICANT CHANGE UP (ref 135–145)

## 2024-11-04 DIAGNOSIS — Z51.11 ENCOUNTER FOR ANTINEOPLASTIC CHEMOTHERAPY: ICD-10-CM

## 2024-11-04 DIAGNOSIS — C56.9 MALIGNANT NEOPLASM OF UNSPECIFIED OVARY: ICD-10-CM

## 2024-11-04 DIAGNOSIS — R11.2 NAUSEA WITH VOMITING, UNSPECIFIED: ICD-10-CM

## 2024-11-11 NOTE — PROVIDER CONTACT NOTE (CRITICAL VALUE NOTIFICATION) - TEST AND RESULT REPORTED:
abnormal abscess culture from 3/15/24. prelimary result- few streptococcus anginosus. susceptibles were not performed
- - -
yes

## 2024-11-15 ENCOUNTER — APPOINTMENT (OUTPATIENT)
Dept: INFUSION THERAPY | Facility: CANCER CENTER | Age: 59
End: 2024-11-15

## 2024-11-15 ENCOUNTER — APPOINTMENT (OUTPATIENT)
Dept: HEMATOLOGY ONCOLOGY | Facility: CLINIC | Age: 59
End: 2024-11-15

## 2024-11-20 ENCOUNTER — NON-APPOINTMENT (OUTPATIENT)
Age: 59
End: 2024-11-20

## 2024-11-25 ENCOUNTER — APPOINTMENT (OUTPATIENT)
Dept: HEMATOLOGY ONCOLOGY | Facility: CLINIC | Age: 59
End: 2024-11-25

## 2024-11-25 ENCOUNTER — APPOINTMENT (OUTPATIENT)
Dept: INFUSION THERAPY | Facility: CANCER CENTER | Age: 59
End: 2024-11-25

## (undated) DEVICE — STAPLER SKIN PROXIMATE

## (undated) DEVICE — FOLEY TRAY 14FR 5CC LTX UMETER CLOSED

## (undated) DEVICE — KIT RAND HANG AND GO HT PAC PRE-ASSEMBLED

## (undated) DEVICE — DRAPE 3/4 SHEET W REINFORCEMENT 56X77"

## (undated) DEVICE — WARMING BLANKET UPPER ADULT

## (undated) DEVICE — LIGASURE IMPACT

## (undated) DEVICE — SUT VICRYL 3-0 27" SH UNDYED

## (undated) DEVICE — DRAPE IOBAN 33" X 23"

## (undated) DEVICE — PACK MAJOR ABDOMINAL WITH LAP

## (undated) DEVICE — GOWN XL

## (undated) DEVICE — GLV 7.5 PROTEXIS (WHITE)

## (undated) DEVICE — KIT SPILL CHEMO

## (undated) DEVICE — SUT PDS II 1 48" TP-1

## (undated) DEVICE — SUT PROLENE 1 30" CT-1

## (undated) DEVICE — DRAPE TOWEL BLUE 17" X 24"

## (undated) DEVICE — VENODYNE/SCD SLEEVE CALF MEDIUM

## (undated) DEVICE — DRAPE INSTRUMENT POUCH 6.75" X 11"